# Patient Record
Sex: MALE | Race: WHITE | NOT HISPANIC OR LATINO | Employment: OTHER | URBAN - NONMETROPOLITAN AREA
[De-identification: names, ages, dates, MRNs, and addresses within clinical notes are randomized per-mention and may not be internally consistent; named-entity substitution may affect disease eponyms.]

---

## 2017-04-17 ENCOUNTER — OFFICE VISIT (OUTPATIENT)
Dept: INTERNAL MEDICINE | Facility: CLINIC | Age: 27
End: 2017-04-17

## 2017-04-17 VITALS
DIASTOLIC BLOOD PRESSURE: 78 MMHG | BODY MASS INDEX: 37.22 KG/M2 | SYSTOLIC BLOOD PRESSURE: 110 MMHG | WEIGHT: 290 LBS | HEIGHT: 74 IN | TEMPERATURE: 98.4 F | OXYGEN SATURATION: 98 % | HEART RATE: 67 BPM

## 2017-04-17 DIAGNOSIS — M25.572 LEFT LATERAL ANKLE PAIN: Primary | ICD-10-CM

## 2017-04-17 PROBLEM — F41.9 ANXIETY: Status: ACTIVE | Noted: 2017-04-17

## 2017-04-17 PROBLEM — M72.2 PLANTAR FASCIITIS: Status: ACTIVE | Noted: 2017-04-17

## 2017-04-17 PROBLEM — F32.A DEPRESSION: Status: ACTIVE | Noted: 2017-04-17

## 2017-04-17 PROBLEM — K21.9 GASTROESOPHAGEAL REFLUX DISEASE: Status: ACTIVE | Noted: 2017-04-17

## 2017-04-17 PROCEDURE — 99213 OFFICE O/P EST LOW 20 MIN: CPT | Performed by: PHYSICIAN ASSISTANT

## 2017-04-17 NOTE — PROGRESS NOTES
Dawit Schmitz is a 27 y.o. male.     Subjective   History of Present Illness   Here today with concern of about 1 year of left ankle pain and swelling intermittently. Pain radiates to the knee when worse than normal.  Swelling tends to occur posterior to the ankle.  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  Has tried wearing different shoes, inserts, weight loss and stretching but nothing has helped. Pain improves if he has a couple days off work in a row. Has been using OTC pain relievers which used to help the pain but do not much anymore. No known injury.       The following portions of the patient's history were reviewed and updated as appropriate: allergies, current medications, past family history, past medical history, past social history, past surgical history and problem list.    Review of Systems    Constitutional: Negative for appetite change, chills, fatigue, fever and unexpected weight change.   HENT: Negative for congestion, ear pain, hearing loss, nosebleeds, postnasal drip, rhinorrhea, sore throat, tinnitus and trouble swallowing.    Eyes: Negative for photophobia, discharge and visual disturbance.   Respiratory: Negative for cough, chest tightness, shortness of breath and wheezing.    Cardiovascular: Negative for chest pain, palpitations and leg swelling.   Gastrointestinal: Negative for abdominal distention, abdominal pain, blood in stool, constipation, diarrhea, nausea and vomiting.   Endocrine: Negative for cold intolerance, heat intolerance, polydipsia, polyphagia and polyuria.   Musculoskeletal: Left ankle pain and swelling.  Negative for back pain, myalgias, neck pain and neck stiffness.   Skin: Negative for color change, pallor, rash and wound.   Allergic/Immunologic: Negative for environmental allergies, food allergies and immunocompromised state.   Neurological: Negative for dizziness, tremors, seizures, weakness, numbness and headaches.  "  Hematological: Negative for adenopathy. Does not bruise/bleed easily.   Psychiatric/Behavioral: Negative for sleep disturbances, agitation, behavioral problems, confusion, hallucinations, self-injury and suicidal ideas. The patient is not nervous/anxious.      Objective    Physical Exam  Constitutional: Oriented to person, place, and time. Appears well-developed and well-nourished.   HENT:   Head: Normocephalic and atraumatic.   Eyes: EOM are normal. Pupils are equal, round, and reactive to light.   Neck: Normal range of motion. Neck supple.   Cardiovascular: Normal rate, regular rhythm and normal heart sounds.    Pulmonary/Chest: Effort normal and breath sounds normal. No respiratory distress.  Has no wheezes or rales. Exhibits no chest wall tenderness.   Abdominal: Soft. Bowel sounds are normal. Exhibits no distension and no mass. There is no tenderness.   Musculoskeletal: anterior left lateral ankle tenderness. Normal range of motion.   Neurological: Alert and oriented to person, place, and time.   Skin: Skin is warm and dry.   Psychiatric: Has a normal mood and affect. Behavior is normal. Judgment and thought content normal.       /78  Pulse 67  Temp 98.4 °F (36.9 °C)  Ht 74\" (188 cm)  Wt 290 lb (132 kg)  SpO2 98%  BMI 37.23 kg/m2    Nursing note and vitals reviewed.        Assessment/Plan   Dawit was seen today for edema.    Diagnoses and all orders for this visit:    Left lateral ankle pain  -     Ambulatory Referral to Orthopedic Surgery  -     diclofenac (VOLTAREN) 50 MG EC tablet; Take 1 tablet by mouth 3 (Three) Times a Day As Needed (for ankle pain, take with food.).             "

## 2017-05-03 DIAGNOSIS — M25.572 LEFT ANKLE PAIN, UNSPECIFIED CHRONICITY: Primary | ICD-10-CM

## 2017-05-04 ENCOUNTER — OFFICE VISIT (OUTPATIENT)
Dept: ORTHOPEDIC SURGERY | Facility: CLINIC | Age: 27
End: 2017-05-04

## 2017-05-04 VITALS — HEIGHT: 73 IN | RESPIRATION RATE: 18 BRPM | WEIGHT: 292 LBS | BODY MASS INDEX: 38.7 KG/M2

## 2017-05-04 DIAGNOSIS — M19.079 ARTHRITIS OF ANKLE JOINT: ICD-10-CM

## 2017-05-04 DIAGNOSIS — M25.572 ACUTE LEFT ANKLE PAIN: Primary | ICD-10-CM

## 2017-05-04 PROCEDURE — 99204 OFFICE O/P NEW MOD 45 MIN: CPT | Performed by: PODIATRIST

## 2017-05-17 ENCOUNTER — APPOINTMENT (OUTPATIENT)
Dept: MRI IMAGING | Facility: HOSPITAL | Age: 27
End: 2017-05-17
Attending: PODIATRIST

## 2017-12-14 ENCOUNTER — OFFICE VISIT (OUTPATIENT)
Dept: INTERNAL MEDICINE | Facility: CLINIC | Age: 27
End: 2017-12-14

## 2017-12-14 VITALS
TEMPERATURE: 98.4 F | BODY MASS INDEX: 37.64 KG/M2 | OXYGEN SATURATION: 98 % | SYSTOLIC BLOOD PRESSURE: 116 MMHG | HEART RATE: 78 BPM | WEIGHT: 284 LBS | HEIGHT: 73 IN | DIASTOLIC BLOOD PRESSURE: 80 MMHG

## 2017-12-14 DIAGNOSIS — F32.0 MILD SINGLE CURRENT EPISODE OF MAJOR DEPRESSIVE DISORDER (HCC): Primary | ICD-10-CM

## 2017-12-14 DIAGNOSIS — F41.9 ANXIETY: ICD-10-CM

## 2017-12-14 DIAGNOSIS — G89.29 CHRONIC PAIN OF LEFT ANKLE: ICD-10-CM

## 2017-12-14 DIAGNOSIS — M25.572 CHRONIC PAIN OF LEFT ANKLE: ICD-10-CM

## 2017-12-14 PROCEDURE — 99214 OFFICE O/P EST MOD 30 MIN: CPT | Performed by: PHYSICIAN ASSISTANT

## 2017-12-14 RX ORDER — FLUOXETINE HYDROCHLORIDE 20 MG/1
20 CAPSULE ORAL DAILY
Qty: 30 CAPSULE | Refills: 2 | Status: SHIPPED | OUTPATIENT
Start: 2017-12-14 | End: 2017-12-18 | Stop reason: SINTOL

## 2017-12-14 NOTE — PROGRESS NOTES
Dawit Schmitz is a 27 y.o. male.     Subjective   History of Present Illness   In the past he has tried counseling and been given ways to help with anxiety but never had any improvement in depression. Has never had medication in the past. Concerned with depression and anxiety worsening in the last year. Has had thoughts of hurting himself off and on since age 14 but worse in the last year.  He denies any attempt to hurt himself in the past and does not feel he would act on anything. Denies HI.     Was seeing Dr. Boone due to chronic left ankle pain and had an MRI ordered which was not completed due to his work schedule. He would like the MRI done now.        The following portions of the patient's history were reviewed and updated as appropriate: allergies, current medications, past family history, past medical history, past social history, past surgical history and problem list.    Review of Systems    Constitutional: Negative for appetite change, chills, fatigue, fever and unexpected weight change.   HENT: Negative for congestion, ear pain, hearing loss, nosebleeds, postnasal drip, rhinorrhea, sore throat, tinnitus and trouble swallowing.    Eyes: Negative for photophobia, discharge and visual disturbance.   Respiratory: Negative for cough, chest tightness, shortness of breath and wheezing.    Cardiovascular: Negative for chest pain, palpitations and leg swelling.   Gastrointestinal: Negative for abdominal distention, abdominal pain, blood in stool, constipation, diarrhea, nausea and vomiting.   Endocrine: Negative for cold intolerance, heat intolerance, polydipsia, polyphagia and polyuria.   Musculoskeletal: left ankle pain. Negative for back pain, joint swelling, myalgias, neck pain and neck stiffness.   Skin: Negative for color change, pallor, rash and wound.   Allergic/Immunologic: Negative for environmental allergies, food allergies and immunocompromised state.   Neurological: Negative for dizziness,  "tremors, seizures, weakness, numbness and headaches.   Hematological: Negative for adenopathy. Does not bruise/bleed easily.   Psychiatric/Behavioral: dysphoric mood, anxious, thoughts of self harm. Negative for sleep disturbances, agitation, behavioral problems, confusion, hallucinations.      Objective    Physical Exam  Constitutional: Oriented to person, place, and time. Appears well-developed and well-nourished.   HENT:   Head: Normocephalic and atraumatic.   Eyes: EOM are normal. Pupils are equal, round, and reactive to light.   Neck: Normal range of motion. Neck supple.   Cardiovascular: Normal rate, regular rhythm and normal heart sounds.    Pulmonary/Chest: Effort normal and breath sounds normal. No respiratory distress.  Has no wheezes or rales. Exhibits no chest wall tenderness.   Abdominal: Soft. Bowel sounds are normal. Exhibits no distension and no mass. There is no tenderness.   Musculoskeletal: Normal range of motion. Exhibits no tenderness.   Neurological: Alert and oriented to person, place, and time.   Skin: Skin is warm and dry.   Psychiatric: Has a normal mood and affect. Behavior is normal. Judgment and thought content normal.       /80  Pulse 78  Temp 98.4 °F (36.9 °C)  Ht 185.4 cm (72.99\")  Wt 129 kg (284 lb)  SpO2 98%  BMI 37.48 kg/m2    Nursing note and vitals reviewed.      PHQ-9 Total Score: 14      Assessment/Plan   Dawit was seen today for depression and l ankle problem.    Diagnoses and all orders for this visit:    Mild single current episode of major depressive disorder  -     FLUoxetine (PROZAC) 20 MG capsule; Take 1 capsule by mouth Daily.    Anxiety  -     FLUoxetine (PROZAC) 20 MG capsule; Take 1 capsule by mouth Daily.  Discussed risks, benefits and potential side effects of medication and he agreed to use.     Chronic pain of left ankle  F/u with Dr. Boone for MRI.         F/u in 1 month to evaluate efficacy of Prozac.            "

## 2017-12-18 ENCOUNTER — TELEPHONE (OUTPATIENT)
Dept: INTERNAL MEDICINE | Facility: CLINIC | Age: 27
End: 2017-12-18

## 2017-12-18 RX ORDER — CITALOPRAM 10 MG/1
10 TABLET ORAL DAILY
Qty: 30 TABLET | Refills: 1 | Status: SHIPPED | OUTPATIENT
Start: 2017-12-18 | End: 2018-01-18 | Stop reason: SDUPTHER

## 2017-12-18 NOTE — TELEPHONE ENCOUNTER
Patient called the office in regards to his Rx her recently was prescribed Prozac. He states that he is concerned due to him having negative side effects with the medication and he is wanting to see if he needs to be seen or come in in regards to changing this medication.

## 2017-12-18 NOTE — TELEPHONE ENCOUNTER
Anxiety is worse, headaches, forgetful, concentration, elevated pulse, agitated, exhaustion, diarrhea, please call to discuss

## 2018-01-18 ENCOUNTER — OFFICE VISIT (OUTPATIENT)
Dept: INTERNAL MEDICINE | Facility: CLINIC | Age: 28
End: 2018-01-18

## 2018-01-18 VITALS
TEMPERATURE: 98.5 F | DIASTOLIC BLOOD PRESSURE: 76 MMHG | HEIGHT: 73 IN | WEIGHT: 283 LBS | OXYGEN SATURATION: 99 % | BODY MASS INDEX: 37.51 KG/M2 | HEART RATE: 84 BPM | SYSTOLIC BLOOD PRESSURE: 120 MMHG

## 2018-01-18 DIAGNOSIS — F41.9 ANXIETY: ICD-10-CM

## 2018-01-18 DIAGNOSIS — H91.92 HEARING LOSS OF LEFT EAR, UNSPECIFIED HEARING LOSS TYPE: ICD-10-CM

## 2018-01-18 DIAGNOSIS — F32.0 MILD SINGLE CURRENT EPISODE OF MAJOR DEPRESSIVE DISORDER (HCC): Primary | ICD-10-CM

## 2018-01-18 PROCEDURE — 99214 OFFICE O/P EST MOD 30 MIN: CPT | Performed by: PHYSICIAN ASSISTANT

## 2018-01-18 RX ORDER — CITALOPRAM 20 MG/1
20 TABLET ORAL DAILY
Qty: 30 TABLET | Refills: 5 | Status: SHIPPED | OUTPATIENT
Start: 2018-01-18 | End: 2018-04-19 | Stop reason: SDUPTHER

## 2018-01-18 NOTE — PROGRESS NOTES
Dawit Schmitz is a 27 y.o. male.     Subjective   History of Present Illness   Here today for follow up of depression and anxiety. 1 month ago was started on Prozac which made him more anxious so he was changed to Celexa which he feels has been very effective, improving symptoms about 65%. Still has occasional days of feeling anxious but they are much more rare. Has not vomited due to anxiety in about 3 weeks.  Suicidal thoughts have drastically improved and are nearly non-existent in the last 2 weeks which he feels is due to his depression being so well controlled. Sleeping well. Denies any side effects of the medication.     Concerned with hearing loss in the left ear for the last 3 months. Denies ear pain, ringing in ears, feeling of fullness or discharge.       The following portions of the patient's history were reviewed and updated as appropriate: allergies, current medications, past family history, past medical history, past social history, past surgical history and problem list.    Review of Systems    Constitutional: Negative for appetite change, chills, fatigue, fever and unexpected weight change.   HENT: hearing loss left ear. Negative for congestion, ear pain, nosebleeds, postnasal drip, rhinorrhea, sore throat, tinnitus and trouble swallowing.    Eyes: Negative for photophobia, discharge and visual disturbance.   Respiratory: Negative for cough, chest tightness, shortness of breath and wheezing.    Cardiovascular: Negative for chest pain, palpitations and leg swelling.   Gastrointestinal: Negative for abdominal distention, abdominal pain, blood in stool, constipation, diarrhea, nausea and vomiting.   Endocrine: Negative for cold intolerance, heat intolerance, polydipsia, polyphagia and polyuria.   Musculoskeletal: Negative for arthralgias, back pain, joint swelling, myalgias, neck pain and neck stiffness.   Skin: Negative for color change, pallor, rash and wound.   Allergic/Immunologic: Negative for  "environmental allergies, food allergies and immunocompromised state.   Neurological: Negative for dizziness, tremors, seizures, weakness, numbness and headaches.   Hematological: Negative for adenopathy. Does not bruise/bleed easily.   Psychiatric/Behavioral: dysphoric mood- improved. Anxiety- improved some. Suicidal ideation- nearly resolved.  Negative for sleep disturbances, agitation, behavioral problems, confusion, hallucinations, self-injury.    Objective    Physical Exam  Constitutional: Oriented to person, place, and time. Appears well-developed and well-nourished.   HENT: bilateral TMs normal. OP normal.   Head: no sinus tenderness. Normocephalic and atraumatic.   Eyes: EOM are normal. Pupils are equal, round, and reactive to light.   Neck: Normal range of motion. Neck supple.   Cardiovascular: Normal rate, regular rhythm and normal heart sounds.    Pulmonary/Chest: Effort normal and breath sounds normal. No respiratory distress.  Has no wheezes or rales. Exhibits no chest wall tenderness.   Abdominal: Soft. Bowel sounds are normal. Exhibits no distension and no mass. There is no tenderness.   Musculoskeletal: Normal range of motion. Exhibits no tenderness.   Neurological: Alert and oriented to person, place, and time.   Skin: Skin is warm and dry.   Psychiatric: Has a normal mood and affect. Behavior is normal. Judgment and thought content normal.       /76  Pulse 84  Temp 98.5 °F (36.9 °C)  Ht 185.4 cm (72.99\")  Wt 128 kg (283 lb)  SpO2 99%  BMI 37.35 kg/m2    Nursing note and vitals reviewed.        Assessment/Plan   Dawit was seen today for follow-up, depression and ear problem.    Diagnoses and all orders for this visit:    Mild single current episode of major depressive disorder    Anxiety    Hearing loss of left ear, unspecified hearing loss type  -     Ambulatory Referral to ENT (Otolaryngology)    Other orders  -     citalopram (CeleXA) 20 MG tablet; Take 1 tablet by mouth " Daily.    Increase Celexa dose. If anxiety does not become better controlled with dose increase he is open to considering addition of Buspar prn.     F/u in 3 months or sooner if needed.

## 2018-01-29 ENCOUNTER — TELEPHONE (OUTPATIENT)
Dept: INTERNAL MEDICINE | Facility: CLINIC | Age: 28
End: 2018-01-29

## 2018-01-29 NOTE — TELEPHONE ENCOUNTER
Patient called and states at his last appointment he discussed buspar, he states he was told to call back if he wanted to start on this medication and he does. He uses [facility] pharmacy.

## 2018-01-30 RX ORDER — BUSPIRONE HYDROCHLORIDE 10 MG/1
TABLET ORAL
Qty: 90 TABLET | Refills: 0 | Status: SHIPPED | OUTPATIENT
Start: 2018-01-30 | End: 2018-03-19

## 2018-03-19 ENCOUNTER — OFFICE VISIT (OUTPATIENT)
Dept: INTERNAL MEDICINE | Facility: CLINIC | Age: 28
End: 2018-03-19

## 2018-03-19 VITALS
HEART RATE: 85 BPM | DIASTOLIC BLOOD PRESSURE: 72 MMHG | SYSTOLIC BLOOD PRESSURE: 119 MMHG | WEIGHT: 271 LBS | OXYGEN SATURATION: 96 % | TEMPERATURE: 98.3 F | BODY MASS INDEX: 35.92 KG/M2 | HEIGHT: 73 IN

## 2018-03-19 DIAGNOSIS — Z00.00 ANNUAL PHYSICAL EXAM: Primary | ICD-10-CM

## 2018-03-19 PROCEDURE — 99395 PREV VISIT EST AGE 18-39: CPT | Performed by: PHYSICIAN ASSISTANT

## 2018-03-19 NOTE — PROGRESS NOTES
Subjective:    Chief Complaint: Physical Exam     History of Present Illness   Dawit Schmitz is a 27 y.o. male here for his yearly physical exam. 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 No new complaints today. Depression/ anxiety stable on Celexa. Occasionally takes diclofenac for ankle pain.     History:  Past Medical History:   Diagnosis Date   • Ankle sprain     left   • Anxiety    • Depression    • Fracture, foot     growth plate in right heel   • Rotator cuff syndrome     right   • Tennis elbow      Do you take any herbs or supplements that were not prescribed by a doctor? no  Are you taking calcium supplements? No    Allergies   Allergen Reactions   • Prozac [Fluoxetine Hcl] Anxiety     Worsened anxiety.        History reviewed. No pertinent surgical history.    Social History   Substance Use Topics   • Smoking status: Never Smoker   • Smokeless tobacco: Not on file   • Alcohol use No       Family History   Problem Relation Age of Onset   • Family history unknown: Yes       Review of Systems   Constitutional: Negative for appetite change, chills, fatigue, fever and unexpected weight change.   HENT: Negative for congestion, ear pain, hearing loss, nosebleeds, sinus pressure, sore throat, tinnitus and trouble swallowing.    Eyes: Negative for photophobia, discharge and visual disturbance.   Respiratory: Negative for cough, chest tightness, shortness of breath and wheezing.    Cardiovascular: Negative for chest pain, palpitations and leg swelling.   Gastrointestinal: Negative for abdominal distention, abdominal pain, blood in stool, constipation, diarrhea, nausea and vomiting.   Endocrine: Negative for cold intolerance, heat intolerance, polydipsia, polyphagia and polyuria.   Genitourinary: Negative for difficulty urinating, dysuria, flank pain, frequency, hematuria and urgency.   Musculoskeletal: Negative.  Negative for arthralgias, back pain, joint swelling, myalgias, neck pain and neck  "stiffness.   Skin: Negative for color change, pallor, rash and wound.   Allergic/Immunologic: Negative for environmental allergies, food allergies and immunocompromised state.   Neurological: Negative for dizziness, weakness, numbness and headaches.   Hematological: Negative for adenopathy. Does not bruise/bleed easily.   Psychiatric/Behavioral: Negative for dysphoric mood, hallucinations, sleep disturbance and suicidal ideas. The patient is not nervous/anxious.       Objective:    Vitals:    03/19/18 1643   BP: 119/72   Pulse: 85   Temp: 98.3 °F (36.8 °C)   SpO2: 96%   Weight: 123 kg (271 lb)   Height: 185.4 cm (72.99\")     Physical Exam   Constitutional: Appears well-developed and well-nourished.   HENT:   Right Ear: Tympanic membrane and external ear normal.   Left Ear: Tympanic membrane and external ear normal.   Nose: Nose normal.   Mouth/Throat: Oropharynx is clear and moist.   Eyes: EOM are normal. Pupils are equal, round, and reactive to light.   Neck: Normal range of motion. Neck supple.   Cardiovascular: Normal rate, regular rhythm and normal heart sounds.    Pulmonary/Chest: Effort normal and breath sounds normal.   Abdominal: Soft. Bowel sounds are normal  Musculoskeletal: Normal range of motion.   Lymphadenopathy: No cervical adenopathy noted.   Neurological: Alert and oriented to person, place, and time.   Skin: Skin is warm and dry.   Psychiatric: Exhibits a normal mood and affect.     Assessment and Plan:     Dawit was seen today for annual exam.    Diagnoses and all orders for this visit:    Annual physical exam    Patient Counseling:  -Nutrition: Stressed importance of moderation in sodium/caffeine intake, saturated fat and cholesterol, caloric balance, sufficient intake of fresh fruits, vegetables, fiber, calcium, iron, and 1 g folate supplementation if of childbearing age.   -Exercise: Stressed the importance of regular exercise.  -Substance Abuse: Discussed cessation/primary prevention of " tobacco (if applicable), alcohol, or other drug use (if applicable); driving or other dangerous activities under the influence; availability of treatment for abuse.   -Injury prevention: Discussed safety belts, safety helmets, smoke detector, smoking near bedding or upholstery.   -Dental health: Discussed importance of regular tooth brushing, flossing, and dental visits.  -Immunizations reviewed.  -After hours service discussed with patient.    Discussed the patient's BMI with him. The BMI is above average; BMI management plan is completed    [facility] [employer]    Return in about 1 year (around 3/19/2019).    Ashleigh Loja PA-C  03/19/2018    Please note that portions of this note were completed with a voice recognition program. Efforts were made to edit dictation, but occasionally words are mistranscribed.

## 2018-04-19 ENCOUNTER — OFFICE VISIT (OUTPATIENT)
Dept: INTERNAL MEDICINE | Facility: CLINIC | Age: 28
End: 2018-04-19

## 2018-04-19 VITALS
SYSTOLIC BLOOD PRESSURE: 120 MMHG | TEMPERATURE: 98.7 F | WEIGHT: 275 LBS | HEART RATE: 88 BPM | BODY MASS INDEX: 36.45 KG/M2 | OXYGEN SATURATION: 98 % | DIASTOLIC BLOOD PRESSURE: 80 MMHG | HEIGHT: 73 IN

## 2018-04-19 DIAGNOSIS — F32.4 MAJOR DEPRESSIVE DISORDER WITH SINGLE EPISODE, IN PARTIAL REMISSION (HCC): Primary | ICD-10-CM

## 2018-04-19 DIAGNOSIS — F41.9 ANXIETY: ICD-10-CM

## 2018-04-19 PROCEDURE — 99213 OFFICE O/P EST LOW 20 MIN: CPT | Performed by: PHYSICIAN ASSISTANT

## 2018-04-19 RX ORDER — CITALOPRAM 10 MG/1
10 TABLET ORAL DAILY
Qty: 30 TABLET | Refills: 5 | Status: SHIPPED | OUTPATIENT
Start: 2018-04-19 | End: 2018-11-20

## 2018-04-19 RX ORDER — CITALOPRAM 20 MG/1
20 TABLET ORAL DAILY
Qty: 30 TABLET | Refills: 5 | Status: SHIPPED | OUTPATIENT
Start: 2018-04-19 | End: 2018-11-20

## 2018-04-19 NOTE — PROGRESS NOTES
Dawit Schmitz is a 28 y.o. male.     Subjective   History of Present Illness   Here today for follow up of depression and anxiety. Dose increase of Celexa 3 months ago has improved his depression significantly and he feels it is about 95% resolved. Anxiety has also improved but not as significantly as depression, approximately a 60% improvement.  He tried Buspar prn which made his anxiety worse. He denies sleep disturbances, SI or HI.            The following portions of the patient's history were reviewed and updated as appropriate: allergies, current medications, past family history, past medical history, past social history, past surgical history and problem list.    Review of Systems    Constitutional: Negative for appetite change, chills, fatigue, fever and unexpected weight change.   HENT: Negative for congestion, ear pain, hearing loss, nosebleeds, postnasal drip, rhinorrhea, sore throat, tinnitus and trouble swallowing.    Eyes: Negative for photophobia, discharge and visual disturbance.   Respiratory: Negative for cough, chest tightness, shortness of breath and wheezing.    Cardiovascular: Negative for chest pain, palpitations and leg swelling.   Gastrointestinal: Negative for abdominal distention, abdominal pain, blood in stool, constipation, diarrhea, nausea and vomiting.   Endocrine: Negative for cold intolerance, heat intolerance, polydipsia, polyphagia and polyuria.   Musculoskeletal: Negative for arthralgias, back pain, joint swelling, myalgias, neck pain and neck stiffness.   Skin: Negative for color change, pallor, rash and wound.   Allergic/Immunologic: Negative for environmental allergies, food allergies and immunocompromised state.   Neurological: Negative for dizziness, tremors, seizures, weakness, numbness and headaches.   Hematological: Negative for adenopathy. Does not bruise/bleed easily.   Psychiatric/Behavioral: dysphoric mood, anxious. Negative for sleep disturbances, agitation,  "behavioral problems, confusion, hallucinations, self-injury and suicidal ideas.     Objective    Physical Exam  Constitutional: Oriented to person, place, and time. Appears well-developed and well-nourished.   Head: Normocephalic and atraumatic.   Eyes: EOM are normal. Pupils are equal, round, and reactive to light.   Neck: Normal range of motion. Neck supple.   Cardiovascular: Normal rate, regular rhythm and normal heart sounds.    Pulmonary/Chest: Effort normal and breath sounds normal. No respiratory distress.  Has no wheezes or rales. Exhibits no chest wall tenderness.   Abdominal: Soft. Bowel sounds are normal. Exhibits no distension and no mass. There is no tenderness.   Musculoskeletal: Normal range of motion. Exhibits no tenderness.   Neurological: Alert and oriented to person, place, and time.   Skin: Skin is warm and dry.   Psychiatric: Has a normal mood and affect. Behavior is normal. Judgment and thought content normal.       /80   Pulse 88   Temp 98.7 °F (37.1 °C)   Ht 185.4 cm (72.99\")   Wt 125 kg (275 lb)   SpO2 98%   BMI 36.29 kg/m²     Nursing note and vitals reviewed.        Assessment/Plan   Dawit was seen today for depression and follow-up.    Diagnoses and all orders for this visit:    Major depressive disorder with single episode, in partial remission  Anxiety  -     citalopram (CeleXA) 20 MG tablet; Take 1 tablet by mouth Daily. Take along with 10 mg dose.  -     citalopram (CELEXA) 10 MG tablet; Take 1 tablet by mouth Daily. Take along with 20 mg dose.  Dose increase to 30 mg daily.       F/u 6 months or sooner if needed.            "

## 2018-05-02 ENCOUNTER — OFFICE VISIT (OUTPATIENT)
Dept: INTERNAL MEDICINE | Facility: CLINIC | Age: 28
End: 2018-05-02

## 2018-05-02 VITALS
WEIGHT: 274 LBS | SYSTOLIC BLOOD PRESSURE: 132 MMHG | HEART RATE: 89 BPM | BODY MASS INDEX: 36.31 KG/M2 | DIASTOLIC BLOOD PRESSURE: 70 MMHG | TEMPERATURE: 98.3 F | HEIGHT: 73 IN | OXYGEN SATURATION: 98 %

## 2018-05-02 DIAGNOSIS — S39.012A STRAIN OF LUMBAR REGION, INITIAL ENCOUNTER: Primary | ICD-10-CM

## 2018-05-02 PROCEDURE — 99213 OFFICE O/P EST LOW 20 MIN: CPT | Performed by: PHYSICIAN ASSISTANT

## 2018-05-02 RX ORDER — CYCLOBENZAPRINE HCL 5 MG
5 TABLET ORAL 3 TIMES DAILY PRN
Qty: 30 TABLET | Refills: 0 | OUTPATIENT
Start: 2018-05-02 | End: 2018-06-19

## 2018-05-02 NOTE — PROGRESS NOTES
Subjective     Chief Complaint: lower back pain    History of Present Illness     Dawit Schmitz is a 28 y.o. male presenting with complaints of lower back pain due to wrestling injury occurring 3 days ago. Fell onto his left side. He has used ice and ibuprofen intermittently, brought some relief. Denies loss in bowel or bladder function, denies blood in urine, denies CP, SOA. Mild discomfort with deep breathing. Feels tight.    The following portions of the patient's history were reviewed and updated as appropriate: current medications, allergies, PMH.    Review of Systems   Constitutional: Negative for appetite change, chills, fatigue, fever and unexpected weight change.   HENT: Negative for congestion, ear pain, hearing loss, nosebleeds, sinus pressure, sore throat, tinnitus and trouble swallowing.    Eyes: Negative for photophobia, discharge and visual disturbance.   Respiratory: Negative for cough, chest tightness, shortness of breath and wheezing.    Cardiovascular: Negative for chest pain, palpitations and leg swelling.   Gastrointestinal: Negative for abdominal distention, abdominal pain, blood in stool, constipation, diarrhea, nausea and vomiting.   Endocrine: Negative for cold intolerance, heat intolerance, polydipsia, polyphagia and polyuria.   Genitourinary: Negative for difficulty urinating, dysuria, flank pain, frequency, hematuria and urgency.   Musculoskeletal: Positive for back pain and myalgias. Negative for arthralgias, joint swelling, neck pain and neck stiffness.   Skin: Negative for color change, pallor, rash and wound.   Allergic/Immunologic: Negative for environmental allergies, food allergies and immunocompromised state.   Neurological: Negative for dizziness, weakness, numbness and headaches.   Hematological: Negative for adenopathy. Does not bruise/bleed easily.   Psychiatric/Behavioral: Negative for dysphoric mood, hallucinations, sleep disturbance and suicidal ideas. The patient is not  "nervous/anxious.      Objective     Vitals:    05/02/18 1712   BP: 132/70   Pulse: 89   Temp: 98.3 °F (36.8 °C)   SpO2: 98%   Weight: 124 kg (274 lb)   Height: 185.4 cm (72.99\")     Physical Exam   Constitutional: He appears well-developed and well-nourished.   Cardiovascular: Normal rate and regular rhythm.    Pulmonary/Chest: Effort normal and breath sounds normal.   Abdominal: Soft. Bowel sounds are normal. There is no tenderness.   Musculoskeletal:        Thoracic back: He exhibits tenderness and spasm. He exhibits normal range of motion.        Lumbar back: He exhibits tenderness and spasm. He exhibits normal range of motion.   TTP left lower lumbar area.     Assessment/Plan     Diagnoses and all orders for this visit:    Strain of lumbar region, initial encounter  -     cyclobenzaprine (FLEXERIL) 5 MG tablet; Take 1 tablet by mouth 3 (Three) Times a Day As Needed for Muscle Spasms.    Pt has diclofenac at home for prn use, may try taking this with food. May also try flexeril qhs.  Rest, take a few days off of wrestling.    Ashleigh Loja PA-C  05/02/2018         Please note that portions of this note were completed with a voice recognition program. Efforts were made to edit dictation, but occasionally words are mistranscribed.    "

## 2018-06-05 PROCEDURE — 99282 EMERGENCY DEPT VISIT SF MDM: CPT

## 2018-06-06 ENCOUNTER — APPOINTMENT (OUTPATIENT)
Dept: GENERAL RADIOLOGY | Facility: HOSPITAL | Age: 28
End: 2018-06-06

## 2018-06-06 ENCOUNTER — HOSPITAL ENCOUNTER (EMERGENCY)
Facility: HOSPITAL | Age: 28
Discharge: HOME | End: 2018-06-06
Attending: STUDENT IN AN ORGANIZED HEALTH CARE EDUCATION/TRAINING PROGRAM | Admitting: STUDENT IN AN ORGANIZED HEALTH CARE EDUCATION/TRAINING PROGRAM

## 2018-06-06 VITALS
OXYGEN SATURATION: 97 % | HEART RATE: 88 BPM | SYSTOLIC BLOOD PRESSURE: 137 MMHG | TEMPERATURE: 98.3 F | HEIGHT: 74 IN | WEIGHT: 267 LBS | RESPIRATION RATE: 20 BRPM | BODY MASS INDEX: 34.27 KG/M2 | DIASTOLIC BLOOD PRESSURE: 90 MMHG

## 2018-06-06 DIAGNOSIS — S22.31XA CLOSED FRACTURE OF ONE RIB OF RIGHT SIDE, INITIAL ENCOUNTER: Primary | ICD-10-CM

## 2018-06-06 PROCEDURE — 71046 X-RAY EXAM CHEST 2 VIEWS: CPT

## 2018-06-06 RX ORDER — HYDROCODONE BITARTRATE AND ACETAMINOPHEN 5; 325 MG/1; MG/1
1 TABLET ORAL EVERY 6 HOURS PRN
Qty: 10 TABLET | Refills: 0 | Status: SHIPPED | OUTPATIENT
Start: 2018-06-06 | End: 2018-11-20

## 2018-06-06 NOTE — ED PROVIDER NOTES
"Subjective   Patient is a 28-year-old male who presents with complaints of right back pain.  He is worried that he cracked a rib.  The patient is currently in training to be a  and states that he hurt his back doing his \"standard workout \".  Worse with taking deep breath or twists.            Review of Systems   All other systems reviewed and are negative.      Past Medical History:   Diagnosis Date   • Ankle sprain     left   • Anxiety    • Depression    • Fracture, foot     growth plate in right heel   • Rotator cuff syndrome     right   • Tennis elbow        Allergies   Allergen Reactions   • Prozac [Fluoxetine Hcl] Anxiety     Worsened anxiety.        History reviewed. No pertinent surgical history.    Family History   Problem Relation Age of Onset   • Family history unknown: Yes       Social History     Social History   • Marital status: Single     Occupational History   •       lockbox     Social History Main Topics   • Smoking status: Never Smoker   • Alcohol use No   • Drug use: No   • Sexual activity: Defer     Other Topics Concern   • Not on file           Objective   Physical Exam   Nursing note and vitals reviewed.    GEN: No acute distress  Head: Normocephalic, atraumatic  Eyes: Pupils equal round reactive to light  ENT: Posterior pharynx normal in appearance, oral mucosa is moist  Chest: Her to palpation right mid thoracic back.  He has pain in this location as well with compression of the ring structure of the chest  Cardiovascular: Regular rate  Lungs: Clear to auscultation bilaterally  Abdomen: Soft, nontender, nondistended, no peritoneal signs  Extremities: No edema, normal appearance  Neuro: GCS 15  Psych: Mood and affect are appropriate    Procedures           ED Course                  MDM  Number of Diagnoses or Management Options  Closed fracture of one rib of right side, initial encounter:   Diagnosis management comments: Right posterior eighth rib fracture    Norco " 5/325 every 6 hours when necessary pain #10       Amount and/or Complexity of Data Reviewed  Tests in the radiology section of CPT®: reviewed  Independent visualization of images, tracings, or specimens: yes          Final diagnoses:   Closed fracture of one rib of right side, initial encounter            Valentin Padilla MD  06/06/18 0248

## 2018-06-11 ENCOUNTER — OFFICE VISIT (OUTPATIENT)
Dept: INTERNAL MEDICINE | Facility: CLINIC | Age: 28
End: 2018-06-11

## 2018-06-11 VITALS
WEIGHT: 272 LBS | HEIGHT: 74 IN | HEART RATE: 75 BPM | OXYGEN SATURATION: 98 % | SYSTOLIC BLOOD PRESSURE: 128 MMHG | TEMPERATURE: 97.7 F | BODY MASS INDEX: 34.91 KG/M2 | DIASTOLIC BLOOD PRESSURE: 80 MMHG

## 2018-06-11 DIAGNOSIS — S22.31XA CLOSED FRACTURE OF ONE RIB OF RIGHT SIDE, INITIAL ENCOUNTER: Primary | ICD-10-CM

## 2018-06-11 DIAGNOSIS — S29.8XXA BLUNT TRAUMA TO CHEST, INITIAL ENCOUNTER: ICD-10-CM

## 2018-06-11 PROCEDURE — 99213 OFFICE O/P EST LOW 20 MIN: CPT | Performed by: PHYSICIAN ASSISTANT

## 2018-06-12 NOTE — PROGRESS NOTES
Subjective     Chief Complaint: rib pain    History of Present Illness     Dawit Schmitz is a 28 y.o. male presenting for follow up regarding possible rib fracture. 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 He states he was going his routine workouts when he noticed a pain in the right side of his back, worse with deep breathing. Pain did not go away that night so he went to the ED and was diagnosed with a posterior rib fracture. Given short course of norco.    Pain has somewhat improved, but he is concerned about being off of work. 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 Does a lot of lifting, bending, twisting, etc.     The following portions of the patient's history were reviewed and updated as appropriate: current medications, allergies, PMH.    Review of Systems   Constitutional: Negative for appetite change, chills, fatigue, fever and unexpected weight change.   HENT: Negative for congestion, ear pain, hearing loss, nosebleeds, sinus pressure, sore throat, tinnitus and trouble swallowing.    Eyes: Negative for photophobia, discharge and visual disturbance.   Respiratory: Negative for cough, chest tightness, shortness of breath and wheezing.    Cardiovascular: Negative for chest pain, palpitations and leg swelling.   Gastrointestinal: Negative for abdominal distention, abdominal pain, blood in stool, constipation, diarrhea, nausea and vomiting.   Endocrine: Negative for cold intolerance, heat intolerance, polydipsia, polyphagia and polyuria.   Genitourinary: Negative for difficulty urinating, dysuria, flank pain, frequency, hematuria and urgency.   Musculoskeletal: Negative for arthralgias, back pain, joint swelling, myalgias, neck pain and neck stiffness.        Rib pain.   Skin: Negative for color change, pallor, rash and wound.   Allergic/Immunologic: Negative for environmental allergies,  "food allergies and immunocompromised state.   Neurological: Negative for dizziness, weakness, numbness and headaches.   Hematological: Negative for adenopathy. Does not bruise/bleed easily.   Psychiatric/Behavioral: Negative for dysphoric mood, hallucinations, sleep disturbance and suicidal ideas. The patient is not nervous/anxious.      Objective     Vitals:    06/11/18 1737   BP: 128/80   Pulse: 75   Temp: 97.7 °F (36.5 °C)   SpO2: 98%   Weight: 123 kg (272 lb)   Height: 188 cm (74.02\")     Physical Exam   Constitutional: He is oriented to person, place, and time. He appears well-developed and well-nourished.   HENT:   Right Ear: Tympanic membrane and external ear normal.   Left Ear: Tympanic membrane and external ear normal.   Nose: Nose normal.   Mouth/Throat: Oropharynx is clear and moist.   Eyes: EOM are normal. Pupils are equal, round, and reactive to light.   Neck: Normal range of motion. Neck supple.   Cardiovascular: Normal rate, regular rhythm and normal heart sounds.    Pulmonary/Chest: Breath sounds normal. He has no wheezes. He has no rales.         He exhibits tenderness. He exhibits no deformity and no retraction.   Area ttp.   Abdominal: Soft. Bowel sounds are normal. There is no CVA tenderness.   Musculoskeletal: Normal range of motion.   Lymphadenopathy:     He has no cervical adenopathy.   Neurological: He is alert and oriented to person, place, and time.   Skin: Skin is warm and dry.   Psychiatric: He has a normal mood and affect.       Assessment/Plan     Diagnoses and all orders for this visit:    Closed fracture of one rib of right side, initial encounter  -     CT Chest Without Contrast    CXR read as no acute bony abnormality, but told right posterior 8th rib fracture in the ED. Will CT to confirm as I do not feel comfortable making recommendations without knowing full extent of injury.    Ashleigh Loja PA-C  06/11/2018         Please note that portions of this note were completed with a " voice recognition program. Efforts were made to edit dictation, but occasionally words are mistranscribed.

## 2018-06-13 ENCOUNTER — TELEPHONE (OUTPATIENT)
Dept: INTERNAL MEDICINE | Facility: CLINIC | Age: 28
End: 2018-06-13

## 2018-06-13 NOTE — TELEPHONE ENCOUNTER
Please let him know I've tried to contact radiology several times and haven't been able to get through. I'd prefer to treat it as a rib fracture until we know otherwise. They also should be contacting him to set up CT scan- that can help us confirm.

## 2018-06-13 NOTE — TELEPHONE ENCOUNTER
DEANA  PT CALLED WANTING TO KNOW IF YOU HAD FOUND OUT ANY INFORMATION THAT YOU WERE LOOKING FOR????

## 2018-06-15 ENCOUNTER — TELEPHONE (OUTPATIENT)
Dept: INTERNAL MEDICINE | Facility: CLINIC | Age: 28
End: 2018-06-15

## 2018-06-15 NOTE — TELEPHONE ENCOUNTER
Please tell him I just checked on this, they actually just scheduled him for 4 PM on Monday (insurance had to approve it).  We are treating it as a fracture until I know otherwise, I would like for him to take it easy until we know more. [phone] is central scheduling, he can call them.

## 2018-06-15 NOTE — TELEPHONE ENCOUNTER
PT CALLED STATING THE HE WAS NEEDING AN UPDATE AS TO WHAT TO DO, HAS NOT BEEN CALLED FOR CT SCAN YET

## 2018-06-18 ENCOUNTER — HOSPITAL ENCOUNTER (OUTPATIENT)
Dept: CT IMAGING | Facility: HOSPITAL | Age: 28
Discharge: HOME | End: 2018-06-18
Admitting: PHYSICIAN ASSISTANT

## 2018-06-18 PROCEDURE — 71250 CT THORAX DX C-: CPT

## 2018-06-19 NOTE — PROGRESS NOTES
If he is still in pain I would not return to wrestling or do any type of activity that could cause trauma/ injury to the area. 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

## 2018-08-22 DIAGNOSIS — S39.012A STRAIN OF LUMBAR REGION, INITIAL ENCOUNTER: ICD-10-CM

## 2018-08-24 RX ORDER — CYCLOBENZAPRINE HCL 5 MG
TABLET ORAL
Qty: 30 TABLET | Refills: 0 | Status: SHIPPED | OUTPATIENT
Start: 2018-08-24 | End: 2018-11-20

## 2018-09-07 ENCOUNTER — TELEPHONE (OUTPATIENT)
Dept: INTERNAL MEDICINE | Facility: CLINIC | Age: 28
End: 2018-09-07

## 2018-09-07 NOTE — TELEPHONE ENCOUNTER
Dawit left a voice mail stating the cyclobenzaprine is not helping him. He is requesting something a little stronger.    Please advise - he last seen Ashleigh

## 2018-09-07 NOTE — TELEPHONE ENCOUNTER
Discussed with patient. Continue with NSAIDs for now. We will need to come in for evaluation. Last seen in June

## 2018-11-02 ENCOUNTER — TELEPHONE (OUTPATIENT)
Dept: INTERNAL MEDICINE | Facility: CLINIC | Age: 28
End: 2018-11-02

## 2018-11-20 ENCOUNTER — OFFICE VISIT (OUTPATIENT)
Dept: INTERNAL MEDICINE | Facility: CLINIC | Age: 28
End: 2018-11-20

## 2018-11-20 ENCOUNTER — RESULTS ENCOUNTER (OUTPATIENT)
Dept: INTERNAL MEDICINE | Facility: CLINIC | Age: 28
End: 2018-11-20

## 2018-11-20 VITALS
SYSTOLIC BLOOD PRESSURE: 122 MMHG | TEMPERATURE: 97.5 F | BODY MASS INDEX: 36.57 KG/M2 | HEIGHT: 74 IN | OXYGEN SATURATION: 97 % | WEIGHT: 285 LBS | HEART RATE: 76 BPM | DIASTOLIC BLOOD PRESSURE: 77 MMHG

## 2018-11-20 DIAGNOSIS — F41.8 ANXIETY WITH DEPRESSION: Primary | ICD-10-CM

## 2018-11-20 DIAGNOSIS — Z00.00 HEALTHCARE MAINTENANCE: ICD-10-CM

## 2018-11-20 DIAGNOSIS — F41.8 ANXIETY WITH DEPRESSION: ICD-10-CM

## 2018-11-20 LAB
25(OH)D3+25(OH)D2 SERPL-MCNC: 18.5 NG/ML
ALBUMIN SERPL-MCNC: 4 G/DL (ref 3.5–5)
ALBUMIN/GLOB SERPL: 1.7 G/DL (ref 1–2)
ALP SERPL-CCNC: 71 U/L (ref 38–126)
ALT SERPL-CCNC: 29 U/L (ref 13–69)
AST SERPL-CCNC: 19 U/L (ref 15–46)
BASOPHILS # BLD AUTO: 0.04 10*3/MM3 (ref 0–0.2)
BASOPHILS NFR BLD AUTO: 0.5 % (ref 0–2.5)
BILIRUB SERPL-MCNC: 0.3 MG/DL (ref 0.2–1.3)
BUN SERPL-MCNC: 17 MG/DL (ref 7–20)
BUN/CREAT SERPL: 17 (ref 6.3–21.9)
CALCIUM SERPL-MCNC: 9.4 MG/DL (ref 8.4–10.2)
CHLORIDE SERPL-SCNC: 107 MMOL/L (ref 98–107)
CHOLEST SERPL-MCNC: 114 MG/DL (ref 0–199)
CO2 SERPL-SCNC: 28 MMOL/L (ref 26–30)
CREAT SERPL-MCNC: 1 MG/DL (ref 0.6–1.3)
EOSINOPHIL # BLD AUTO: 0.19 10*3/MM3 (ref 0–0.7)
EOSINOPHIL NFR BLD AUTO: 2.6 % (ref 0–7)
ERYTHROCYTE [DISTWIDTH] IN BLOOD BY AUTOMATED COUNT: 13 % (ref 11.5–14.5)
GLOBULIN SER CALC-MCNC: 2.4 GM/DL
GLUCOSE SERPL-MCNC: 103 MG/DL (ref 74–98)
HCT VFR BLD AUTO: 42.1 % (ref 42–52)
HDLC SERPL-MCNC: 49 MG/DL (ref 40–60)
HGB BLD-MCNC: 13.4 G/DL (ref 14–18)
IMM GRANULOCYTES # BLD: 0.02 10*3/MM3 (ref 0–0.06)
IMM GRANULOCYTES NFR BLD: 0.3 % (ref 0–0.6)
LDLC SERPL CALC-MCNC: 59 MG/DL (ref 0–99)
LYMPHOCYTES # BLD AUTO: 1.36 10*3/MM3 (ref 0.6–3.4)
LYMPHOCYTES NFR BLD AUTO: 18.4 % (ref 10–50)
MCH RBC QN AUTO: 30.8 PG (ref 27–31)
MCHC RBC AUTO-ENTMCNC: 31.8 G/DL (ref 30–37)
MCV RBC AUTO: 96.8 FL (ref 80–94)
MONOCYTES # BLD AUTO: 0.66 10*3/MM3 (ref 0–0.9)
MONOCYTES NFR BLD AUTO: 8.9 % (ref 0–12)
NEUTROPHILS # BLD AUTO: 5.13 10*3/MM3 (ref 2–6.9)
NEUTROPHILS NFR BLD AUTO: 69.3 % (ref 37–80)
NRBC BLD AUTO-RTO: 0 /100 WBC (ref 0–0)
PLATELET # BLD AUTO: 222 10*3/MM3 (ref 130–400)
POTASSIUM SERPL-SCNC: 4.7 MMOL/L (ref 3.5–5.1)
PROT SERPL-MCNC: 6.4 G/DL (ref 6.3–8.2)
RBC # BLD AUTO: 4.35 10*6/MM3 (ref 4.7–6.1)
SODIUM SERPL-SCNC: 140 MMOL/L (ref 137–145)
TRIGL SERPL-MCNC: 30 MG/DL
TSH SERPL DL<=0.005 MIU/L-ACNC: 3.11 MIU/ML (ref 0.47–4.68)
VIT B12 SERPL-MCNC: 348 PG/ML (ref 239–931)
VLDLC SERPL CALC-MCNC: 6 MG/DL
WBC # BLD AUTO: 7.4 10*3/MM3 (ref 4.8–10.8)

## 2018-11-20 PROCEDURE — 99213 OFFICE O/P EST LOW 20 MIN: CPT | Performed by: PHYSICIAN ASSISTANT

## 2018-11-20 RX ORDER — CITALOPRAM 40 MG/1
40 TABLET ORAL DAILY
Qty: 30 TABLET | Refills: 5 | Status: SHIPPED | OUTPATIENT
Start: 2018-11-20 | End: 2019-01-11

## 2018-11-20 NOTE — PROGRESS NOTES
Subjective     Chief Complaint: anxiety and depression    History of Present Illness     Dawit Schmitz is a 28 y.o. male presenting with complaints of worsening anxiety and depression over the past several months. Symptoms dramatically worse over the past 2 weeks, patient had to leave work yesterday. He is currently on celexa 30mg. 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 In the past he's tried wellbutrin, prozac, buspar without good results. He is also interested in seeing a therapist. Denies SI, HI.    Patient has also not had any labs in the past several years, interested in checking cholesterol, thyroid, etc.    The following portions of the patient's history were reviewed and updated as appropriate: current medications, allergies, PMH.    Review of Systems   Constitutional: Positive for fatigue. Negative for appetite change, chills, fever and unexpected weight change.   HENT: Negative for congestion, ear pain, hearing loss, nosebleeds, sinus pressure, sore throat, tinnitus and trouble swallowing.    Eyes: Negative for photophobia, discharge and visual disturbance.   Respiratory: Negative for cough, chest tightness, shortness of breath and wheezing.    Cardiovascular: Negative for chest pain, palpitations and leg swelling.   Gastrointestinal: Negative for abdominal distention, abdominal pain, blood in stool, constipation, diarrhea, nausea and vomiting.   Endocrine: Negative for cold intolerance, heat intolerance, polydipsia, polyphagia and polyuria.   Genitourinary: Negative for difficulty urinating, dysuria, flank pain, frequency, hematuria and urgency.   Musculoskeletal: Negative.  Negative for arthralgias, back pain, joint swelling, myalgias, neck pain and neck stiffness.   Skin: Negative for color change, pallor, rash and wound.   Allergic/Immunologic: Negative for environmental allergies, food allergies and immunocompromised state.   Neurological: Negative for dizziness,  "weakness, numbness and headaches.   Hematological: Negative for adenopathy. Does not bruise/bleed easily.   Psychiatric/Behavioral: Positive for decreased concentration, dysphoric mood and sleep disturbance. Negative for hallucinations and suicidal ideas. The patient is nervous/anxious.        Objective     Vitals:    11/20/18 0857   BP: 122/77   Pulse: 76   Temp: 97.5 °F (36.4 °C)   SpO2: 97%   Weight: 129 kg (285 lb)   Height: 188 cm (74.02\")     Physical Exam   Constitutional: He is oriented to person, place, and time. He appears well-developed and well-nourished.   Cardiovascular: Normal rate and regular rhythm.   Pulmonary/Chest: Effort normal and breath sounds normal.   Neurological: He is alert and oriented to person, place, and time.   Skin: Skin is warm and dry.   Psychiatric: He has a normal mood and affect.     Assessment/Plan     Diagnoses and all orders for this visit:    Anxiety with depression  -     citalopram (CELEXA) 40 MG tablet; Take 1 tablet by mouth Daily.  -     Ambulatory Referral to Psychology  -     TSH  -     Genetic Testing - Saliva,; Future    Will increase celexa to 40mg, initiate counseling.    Healthcare maintenance  -     CBC & Differential  -     TSH  -     Vitamin D 25 hydroxy  -     Lipid panel  -     Vitamin B12  -     Comprehensive Metabolic Panel      Ashleigh Loja PA-C  11/20/2018         Please note that portions of this note were completed with a voice recognition program. Efforts were made to edit dictation, but occasionally words are mistranscribed.    "

## 2019-01-11 ENCOUNTER — OFFICE VISIT (OUTPATIENT)
Dept: INTERNAL MEDICINE | Facility: CLINIC | Age: 29
End: 2019-01-11

## 2019-01-11 VITALS
SYSTOLIC BLOOD PRESSURE: 139 MMHG | BODY MASS INDEX: 36.06 KG/M2 | OXYGEN SATURATION: 98 % | HEIGHT: 74 IN | TEMPERATURE: 97.3 F | WEIGHT: 281 LBS | DIASTOLIC BLOOD PRESSURE: 92 MMHG | HEART RATE: 87 BPM

## 2019-01-11 DIAGNOSIS — Z81.8 FAMILY HISTORY OF BIPOLAR DISORDER: ICD-10-CM

## 2019-01-11 DIAGNOSIS — F41.8 DEPRESSION WITH ANXIETY: Primary | ICD-10-CM

## 2019-01-11 PROCEDURE — 99214 OFFICE O/P EST MOD 30 MIN: CPT | Performed by: PHYSICIAN ASSISTANT

## 2019-01-11 RX ORDER — HYDROXYZINE HYDROCHLORIDE 25 MG/1
TABLET, FILM COATED ORAL
Qty: 45 TABLET | Refills: 2 | Status: SHIPPED | OUTPATIENT
Start: 2019-01-11 | End: 2019-03-20 | Stop reason: SDUPTHER

## 2019-01-11 RX ORDER — DESVENLAFAXINE SUCCINATE 50 MG/1
50 TABLET, EXTENDED RELEASE ORAL DAILY
Qty: 30 TABLET | Refills: 2 | Status: SHIPPED | OUTPATIENT
Start: 2019-01-11 | End: 2019-01-16
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
## 2019-01-16 RX ORDER — PAROXETINE HYDROCHLORIDE 20 MG/1
20 TABLET, FILM COATED ORAL DAILY
Qty: 30 TABLET | Refills: 5 | Status: SHIPPED | OUTPATIENT
Start: 2019-01-16 | End: 2019-03-20 | Stop reason: SDUPTHER

## 2019-02-20 ENCOUNTER — OFFICE VISIT (OUTPATIENT)
Dept: PSYCHIATRY | Facility: CLINIC | Age: 29
End: 2019-02-20

## 2019-02-20 VITALS — HEIGHT: 74 IN | BODY MASS INDEX: 40.04 KG/M2 | WEIGHT: 312 LBS

## 2019-02-20 DIAGNOSIS — F12.20 CANNABIS DEPENDENCE (HCC): ICD-10-CM

## 2019-02-20 DIAGNOSIS — F10.10 ALCOHOL ABUSE: ICD-10-CM

## 2019-02-20 DIAGNOSIS — F41.1 GENERALIZED ANXIETY DISORDER: Primary | ICD-10-CM

## 2019-02-20 PROCEDURE — 90792 PSYCH DIAG EVAL W/MED SRVCS: CPT | Performed by: NURSE PRACTITIONER

## 2019-02-20 RX ORDER — CLONIDINE HYDROCHLORIDE 0.1 MG/1
0.1 TABLET ORAL 2 TIMES DAILY
Qty: 60 TABLET | Refills: 0 | Status: SHIPPED | OUTPATIENT
Start: 2019-02-20 | End: 2019-03-20 | Stop reason: SDUPTHER

## 2019-02-20 NOTE — PROGRESS NOTES
Subjective   Dawit Schmitz is a 28 y.o. male who is here today for initial appointment to evaluate for medication options.     Chief Complaint:  Depression and anxiety     HPI:  History of Present Illness   Patient presents today for initial evaluation. He reports he wants to get back into therapy and get medication for depression and anxiety. Patient has experienced depression and anxiety since age 13. Patient reports his issues began at 13 when he stopped taking Ritalin. He reports he started ADHD treatment in the second grade. Patient reports he continued to do well in school after stopping Ritalin. Patient's PCP currently weaning patient off of Celexa and patient has started his first dose of Paxil. Patient currently denies depression. He reports Celexa managed his depression but did not help his anxiety. Patient reports excessive worrying, feels on edge and irritable at times, he constantly worries and anticipates about the next thing happening, and headaches. Patient reports he had passive suicidal thoughts a few months ago. He denies suicidal and homicidal thoughts. He reports he loves to live. He denies auditory and visual hallucinations. Patient reports sleep is fair. He reports he has nights were he has racing thoughts which make it difficult for him to fall asleep. Patient denies decreased need for sleep or increase in interest or activities.     Past Psych History: Some therapy when he was at [facility] 2008--2013. He has seen several different therapist over the past several years. He reports he would go until he would feel better and stop or he wasn't able to afford his copay at the time. He denies psychiatric hospitalizations. He denies suicide attempts.     Previous Psych Meds: Wellbutrin, Celexa, Paxil, Prozac, Buspar, Ritalin and Effexor.     Substance Abuse: Patient denies tobacco use. He reports will vape nicotine juice at times, last use three days ago. Patient drinks beer 6-8 per week, last use  yesterday. Patient smokes marijuana 1 gram daily, last use yesterday. Patient is minimizing of his alcohol and marijuana use.     Social History: Patient raised by mother and step-father. He reports a good relationship with family. Patient did not have a relationship with his biological father. Patient denies trauma or abuse. He denies legal issues. He has a Bachelor's Degree in Game [employer]. Patient is engaged for 4 1/2 years. He has never been  and has no children. He is Atheist. He is currently unemployed. 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 He enjoys playing video games, watching movies and playing with dogs.       Family Psychiatric History:  Family history is unknown by patient.    Medical/Surgical History:  Past Medical History:   Diagnosis Date   • Ankle sprain     left   • Anxiety    • Depression    • Fracture, foot     growth plate in right heel   • Rotator cuff syndrome     right   • Tennis elbow      No past surgical history on file.    Allergies   Allergen Reactions   • Prozac [Fluoxetine Hcl] Anxiety     Worsened anxiety.            Current Medications:   Current Outpatient Medications   Medication Sig Dispense Refill   • CloNIDine (CATAPRES) 0.1 MG tablet Take 1 tablet by mouth 2 (Two) Times a Day. 60 tablet 0   • hydrOXYzine (ATARAX) 25 MG tablet Take 1-2 tabs q6h prn anxiety. 45 tablet 2   • PARoxetine (PAXIL) 20 MG tablet Take 1 tablet by mouth Daily. 30 tablet 5     No current facility-administered medications for this visit.          Review of Systems   Constitutional: Positive for fatigue.   Eyes: Negative.    Respiratory: Negative.    Cardiovascular: Negative.    Gastrointestinal: Negative.    Endocrine: Negative.    Genitourinary: Negative.    Musculoskeletal: Negative.    Skin:        Multiple tattoos    Allergic/Immunologic: Negative.    Neurological: Positive for headaches.   Hematological: Negative.    Psychiatric/Behavioral: Positive for decreased  "concentration and sleep disturbance. The patient is nervous/anxious.     denies HEENT, cardiovascular, respiratory, liver, renal, GI/, endocrine, neuro, DERM, hematology, immunology, musculoskeletal disorders.    Objective   Physical Exam   Constitutional: He appears well-developed and well-nourished. No distress.   Vitals reviewed.    Height 188 cm (74\"), weight (!) 142 kg (312 lb).    Mental Status Exam:   Hygiene:   good  Cooperation:  Cooperative  Eye Contact:  Good  Psychomotor Behavior:  Appropriate  Affect:  Full range  Hopelessness: 4  Speech:  Normal  Thought Process:  Goal directed and Linear  Thought Content:  Mood congurent  Suicidal:  None  Homicidal:  None  Hallucinations:  None  Delusion:  None  Memory:  Intact  Orientation:  Person, Place, Time and Situation  Reliability:  good  Insight:  Fair  Judgement:  Fair  Impulse Control:  Fair  Physical/Medical Issues:  No       Short-term goals: Patient will be compliant with clinic appointments.  Patient will be engaged in therapy, medication compliant with minimal side effects. Patient  will report decrease of symptoms and frequency.    Long-term goals: Patient will have minimal symptoms of depression and anxiety with continued medication management. Patient will be compliant with treatment and appointments.       Problem list: Depression and Anxiety   Strengths: Motivated for treatment, educated   Weaknesses: Unemployed, poor coping skills, substance use     Assessment/Plan   Diagnoses and all orders for this visit:    Generalized anxiety disorder    Cannabis dependence (CMS/HCC)    Alcohol abuse    Other orders  -     CloNIDine (CATAPRES) 0.1 MG tablet; Take 1 tablet by mouth 2 (Two) Times a Day.        UDS positive for THC   Ed reviewed - Request # : [id number] - no red flags     Body mass index is 40.06 kg/m².  Patient was educated on healthier and more balanced diet choices and encouraged exercise within physical limitations.  Functionality: pt " having significant impairment in important areas of daily functioning.  Prognosis: Good dependent on medication/follow up and treatment plan compliance.    Impression: Patient experiencing worsening of anxiety and sleep disturbance at times.    Plan:  1. Continue Paxil 20 mg po daily   2. Begin Clonidine 0.1 mg po BID for racing thoughts, anxiety and physiological effects of anxiety on BP.  3. Begin psychotherapy  4. RTC in 4 weeks     Discussed medication options.  Discussed the risks, benefits, and side effects of the medication; client acknowledged and verbally consented.  Patient is aware to contact the [facility] with any worsening of symptom.  Patient is agreeable to go to the ER or call 911 should they begin SI/HI.

## 2019-03-20 ENCOUNTER — OFFICE VISIT (OUTPATIENT)
Dept: PSYCHIATRY | Facility: CLINIC | Age: 29
End: 2019-03-20

## 2019-03-20 VITALS — WEIGHT: 300 LBS | HEIGHT: 74 IN | BODY MASS INDEX: 38.5 KG/M2

## 2019-03-20 DIAGNOSIS — F10.10 ALCOHOL ABUSE: ICD-10-CM

## 2019-03-20 DIAGNOSIS — F41.1 GENERALIZED ANXIETY DISORDER: Primary | ICD-10-CM

## 2019-03-20 DIAGNOSIS — F12.20 CANNABIS DEPENDENCE (HCC): ICD-10-CM

## 2019-03-20 DIAGNOSIS — F33.1 MODERATE EPISODE OF RECURRENT MAJOR DEPRESSIVE DISORDER (HCC): ICD-10-CM

## 2019-03-20 PROCEDURE — 99214 OFFICE O/P EST MOD 30 MIN: CPT | Performed by: NURSE PRACTITIONER

## 2019-03-20 RX ORDER — PAROXETINE 30 MG/1
30 TABLET, FILM COATED ORAL DAILY
Qty: 30 TABLET | Refills: 1 | Status: SHIPPED | OUTPATIENT
Start: 2019-03-20 | End: 2019-07-03 | Stop reason: SDUPTHER

## 2019-03-20 RX ORDER — CLONIDINE HYDROCHLORIDE 0.1 MG/1
0.1 TABLET ORAL 2 TIMES DAILY
Qty: 60 TABLET | Refills: 0 | Status: SHIPPED | OUTPATIENT
Start: 2019-03-20 | End: 2019-06-19 | Stop reason: SDUPTHER

## 2019-03-20 RX ORDER — HYDROXYZINE HYDROCHLORIDE 25 MG/1
TABLET, FILM COATED ORAL
Qty: 60 TABLET | Refills: 1 | Status: SHIPPED | OUTPATIENT
Start: 2019-03-20 | End: 2019-07-03 | Stop reason: SDUPTHER

## 2019-03-20 NOTE — PROGRESS NOTES
"Subjective   Dawit Schmitz is a 28 y.o. male who is here today for a follow-up appointment    Chief Complaint: Follow-up  Depression and anxiety     HPI:  History of Present Illness   Patient presents for follow-up today. He reports an increase in depression and anxiety now that his PCP weaned him off Celexa and started him on Paxil. He has been on Paxil now for a month. He rates depression 6/10 and anxiety 8/10 with 10 being the worst. Sleep is fair. Appetite is fair. He reports no interest or motivation. Has to force himself to get out of bed. Feelings of hopelessness. He continues to have excessive worrying, restlessness and feeling on edge. No headaches since last visit. A couple of \"small\" panic attacks. He reports shakiness, chest tightness, rapid heart rate. He reports hydroxyzine is helpful when he takes two of them. He reports he started a new job which he likes. He is working as a cook at a new restaurant. He denies suicidal and homicidal thoughts. He reports he loves to live. He denies auditory and visual hallucinations.  He continues to use cannabis.  He continues to use alcohol.  He reports last drink a couple of beers a week ago.  Patient reports he did not start clonidine as they did not have the prescription at the pharmacy.    Family Psychiatric History:  Family history is unknown by patient.    Medical/Surgical History:  Past Medical History:   Diagnosis Date   • Ankle sprain     left   • Anxiety    • Depression    • Fracture, foot     growth plate in right heel   • Rotator cuff syndrome     right   • Tennis elbow      No past surgical history on file.    Allergies   Allergen Reactions   • Prozac [Fluoxetine Hcl] Anxiety     Worsened anxiety.            Current Medications:   Current Outpatient Medications   Medication Sig Dispense Refill   • CloNIDine (CATAPRES) 0.1 MG tablet Take 1 tablet by mouth 2 (Two) Times a Day. 60 tablet 0   • hydrOXYzine (ATARAX) 25 MG tablet Take 1-2 tabs q6h prn " "anxiety. 60 tablet 1   • PARoxetine (PAXIL) 30 MG tablet Take 1 tablet by mouth Daily. 30 tablet 1     No current facility-administered medications for this visit.          Review of Systems   Constitutional: Positive for fatigue.   Eyes: Negative.    Respiratory: Negative.    Cardiovascular: Negative.    Gastrointestinal: Negative.    Endocrine: Negative.    Genitourinary: Negative.    Musculoskeletal: Negative.    Skin:        Multiple tattoos    Allergic/Immunologic: Negative.    Hematological: Negative.    Psychiatric/Behavioral: Positive for decreased concentration and sleep disturbance. The patient is nervous/anxious.     denies HEENT, cardiovascular, respiratory, liver, renal, GI/, endocrine, neuro, DERM, hematology, immunology, musculoskeletal disorders.    Objective   Physical Exam   Constitutional: He appears well-developed and well-nourished. No distress.   Vitals reviewed.    Height 188 cm (74\"), weight 136 kg (300 lb).    Mental Status Exam:   Hygiene:   good  Cooperation:  Cooperative  Eye Contact:  Good  Psychomotor Behavior:  Appropriate  Affect:  Full range  Hopelessness: 4  Speech:  Normal  Thought Process:  Goal directed and Linear  Thought Content:  Mood congurent  Suicidal:  None  Homicidal:  None  Hallucinations:  None  Delusion:  None  Memory:  Intact  Orientation:  Person, Place, Time and Situation  Reliability:  good  Insight:  Fair  Judgement:  Fair  Impulse Control:  Fair  Physical/Medical Issues:  No       Assessment/Plan   Diagnoses and all orders for this visit:    Generalized anxiety disorder  -     CloNIDine (CATAPRES) 0.1 MG tablet; Take 1 tablet by mouth 2 (Two) Times a Day.  -     hydrOXYzine (ATARAX) 25 MG tablet; Take 1-2 tabs q6h prn anxiety.  -     PARoxetine (PAXIL) 30 MG tablet; Take 1 tablet by mouth Daily.    Moderate episode of recurrent major depressive disorder (CMS/HCC)  -     PARoxetine (PAXIL) 30 MG tablet; Take 1 tablet by mouth Daily.    Cannabis dependence " (CMS/[facility])    Alcohol abuse        Body mass index is 38.52 kg/m².  Patient was educated on healthier and more balanced diet choices and encouraged exercise within physical limitations.  Functionality: pt having significant impairment in important areas of daily functioning.  Prognosis: Good dependent on medication/follow up and treatment plan compliance.    Impression: Patient experiencing worsening of anxiety, depression and sleep disturbance.    Plan:  1. Increase Paxil 30 mg po daily for depression and anxiety.   2. Begin Clonidine 0.1 mg po BID for racing thoughts, anxiety and physiological effects of anxiety on BP.  3. Begin psychotherapy as scheduled   4. RTC in 1 month  5. Continue Atarax 25 mg 1-2 po Q6 hr prn anxiety or panic.    Discussed medication options.  Discussed the risks, benefits, and side effects of the medication; client acknowledged and verbally consented.  Patient is aware to contact the [facility] with any worsening of symptom.  Patient is agreeable to go to the ER or call 911 should they begin SI/HI.

## 2019-06-13 RX ORDER — CLONIDINE HYDROCHLORIDE 0.1 MG/1
TABLET ORAL
Qty: 60 TABLET | Refills: 0 | OUTPATIENT
Start: 2019-06-13

## 2019-06-19 DIAGNOSIS — F41.1 GENERALIZED ANXIETY DISORDER: ICD-10-CM

## 2019-06-19 RX ORDER — CLONIDINE HYDROCHLORIDE 0.1 MG/1
0.1 TABLET ORAL 2 TIMES DAILY
Qty: 60 TABLET | Refills: 0 | Status: SHIPPED | OUTPATIENT
Start: 2019-06-19 | End: 2019-07-03 | Stop reason: SDUPTHER

## 2019-07-03 ENCOUNTER — OFFICE VISIT (OUTPATIENT)
Dept: PSYCHIATRY | Facility: CLINIC | Age: 29
End: 2019-07-03

## 2019-07-03 VITALS — HEIGHT: 74 IN | WEIGHT: 315 LBS | BODY MASS INDEX: 40.43 KG/M2

## 2019-07-03 DIAGNOSIS — F41.1 GENERALIZED ANXIETY DISORDER: ICD-10-CM

## 2019-07-03 DIAGNOSIS — F33.1 MODERATE EPISODE OF RECURRENT MAJOR DEPRESSIVE DISORDER (HCC): ICD-10-CM

## 2019-07-03 PROCEDURE — 99213 OFFICE O/P EST LOW 20 MIN: CPT | Performed by: NURSE PRACTITIONER

## 2019-07-03 RX ORDER — HYDROXYZINE HYDROCHLORIDE 25 MG/1
TABLET, FILM COATED ORAL
Qty: 60 TABLET | Refills: 2 | Status: SHIPPED | OUTPATIENT
Start: 2019-07-03 | End: 2021-04-06 | Stop reason: DRUGHIGH

## 2019-07-03 RX ORDER — CLONIDINE HYDROCHLORIDE 0.1 MG/1
0.1 TABLET ORAL 2 TIMES DAILY
Qty: 60 TABLET | Refills: 2 | Status: SHIPPED | OUTPATIENT
Start: 2019-07-03 | End: 2021-04-06

## 2019-07-03 RX ORDER — PAROXETINE 30 MG/1
30 TABLET, FILM COATED ORAL DAILY
Qty: 30 TABLET | Refills: 2 | Status: SHIPPED | OUTPATIENT
Start: 2019-07-03 | End: 2021-04-06

## 2019-07-03 NOTE — PROGRESS NOTES
Subjective   Dawit Schmitz is a 29 y.o. male who is here today for a follow-up appointment    Chief Complaint: Follow-up  Depression and anxiety     HPI:  History of Present Illness   Patient presents for follow-up today. Patient reports medications are working well. He reports he can really tell the Clonidine has helped. He reports being out of it for a couple of weeks and his panic symptoms came back. He denies depression. Reports it is managed. He reports occassionally he will have a bad day. He reports anxiety is manageable with Clonidine. No further panic attacks as long as he takes Clonidine. He shares he has plans to go out of town to visit his parents and is looking forward to this. He reports sleep and appetite are good. He denies SI/HI/AH/VH. He reports tolerating medications without side effects.     Family Psychiatric History:  Family history is unknown by patient.    Medical/Surgical History:  Past Medical History:   Diagnosis Date   • Ankle sprain     left   • Anxiety    • Depression    • Fracture, foot     growth plate in right heel   • Rotator cuff syndrome     right   • Tennis elbow      No past surgical history on file.    Allergies   Allergen Reactions   • Prozac [Fluoxetine Hcl] Anxiety     Worsened anxiety.            Current Medications:   Current Outpatient Medications   Medication Sig Dispense Refill   • CloNIDine (CATAPRES) 0.1 MG tablet Take 1 tablet by mouth 2 (Two) Times a Day. 60 tablet 2   • hydrOXYzine (ATARAX) 25 MG tablet Take 1-2 tabs q6h prn anxiety. 60 tablet 2   • PARoxetine (PAXIL) 30 MG tablet Take 1 tablet by mouth Daily. 30 tablet 2     No current facility-administered medications for this visit.          Review of Systems   Constitutional: Negative.    Eyes: Negative.    Respiratory: Negative.    Cardiovascular: Negative.    Gastrointestinal: Negative.    Endocrine: Negative.    Genitourinary: Negative.    Musculoskeletal: Negative.    Skin:        Multiple tattoos   "  Allergic/Immunologic: Negative.    Neurological: Negative.    Hematological: Negative.    Psychiatric/Behavioral: The patient is nervous/anxious.     denies HEENT, cardiovascular, respiratory, liver, renal, GI/, endocrine, neuro, DERM, hematology, immunology, musculoskeletal disorders.    Objective   Physical Exam   Constitutional: He appears well-developed and well-nourished. No distress.   Vitals reviewed.    Height 188 cm (74\"), weight (!) 153 kg (338 lb).    Mental Status Exam:   Hygiene:   good  Cooperation:  Cooperative  Eye Contact:  Good  Psychomotor Behavior:  Appropriate  Affect:  Full range  Hopelessness: Denies  Speech:  Normal  Thought Process:  Goal directed and Linear  Thought Content:  Normal  Suicidal:  None  Homicidal:  None  Hallucinations:  None  Delusion:  None  Memory:  Intact  Orientation:  Person, Place, Time and Situation  Reliability:  good  Insight:  Fair  Judgement:  Fair  Impulse Control:  Fair  Physical/Medical Issues:  No       Assessment/Plan   Diagnoses and all orders for this visit:    Generalized anxiety disorder  -     CloNIDine (CATAPRES) 0.1 MG tablet; Take 1 tablet by mouth 2 (Two) Times a Day.  -     hydrOXYzine (ATARAX) 25 MG tablet; Take 1-2 tabs q6h prn anxiety.  -     PARoxetine (PAXIL) 30 MG tablet; Take 1 tablet by mouth Daily.    Moderate episode of recurrent major depressive disorder (CMS/HCC)  -     PARoxetine (PAXIL) 30 MG tablet; Take 1 tablet by mouth Daily.        Body mass index is 43.4 kg/m².  Patient was educated on healthier and more balanced diet choices and encouraged exercise within physical limitations.  Functionality: pt having significant impairment in important areas of daily functioning.  Prognosis: Good dependent on medication/follow up and treatment plan compliance.    Impression: Patient experiencing worsening of anxiety, depression and sleep disturbance.    Plan:  1. Continue Paxil 30 mg po daily for depression and anxiety.   2. Begin Clonidine " 0.1 mg po BID for racing thoughts, anxiety and physiological effects of anxiety on BP.  3. Continue Atarax 25 mg 1-2 po Q6 hr prn anxiety or panic.  4. RTC in 3 months     Discussed medication options.  Discussed the risks, benefits, and side effects of the medication; client acknowledged and verbally consented.  Patient is aware to contact the [facility] with any worsening of symptom.  Patient is agreeable to go to the ER or call 911 should they begin SI/HI.

## 2019-08-22 ENCOUNTER — OFFICE VISIT (OUTPATIENT)
Dept: PSYCHIATRY | Facility: CLINIC | Age: 29
End: 2019-08-22

## 2019-08-22 DIAGNOSIS — F10.10 ALCOHOL ABUSE: ICD-10-CM

## 2019-08-22 DIAGNOSIS — F12.20 CANNABIS DEPENDENCE (HCC): ICD-10-CM

## 2019-08-22 DIAGNOSIS — F41.1 GENERALIZED ANXIETY DISORDER: Primary | ICD-10-CM

## 2019-08-22 DIAGNOSIS — F33.1 MODERATE EPISODE OF RECURRENT MAJOR DEPRESSIVE DISORDER (HCC): ICD-10-CM

## 2019-08-22 PROCEDURE — 90791 PSYCH DIAGNOSTIC EVALUATION: CPT | Performed by: SOCIAL WORKER

## 2019-08-22 NOTE — PROGRESS NOTES
"  Subjective   Patient ID: Dawit Schmitz is a 29 y.o. male presenting to [facility] for an initial evaluation.    Time: 1:01 pm -1:57 pm    Chief Complaint: Anxiety    Presenting Concern(s)   The patient is referred by the psychiatric nurse practitioner for anxiety and some depression.  The patient shares that he has had anxiety for much of his life and believes that's the most difficult to deal with.  Patient shares he has difficulty being able to shot his thoughts off and often ruminates on current worries.  He shares that he spends \"a lot of time in my head\" as he shares current symptoms.  He shares that he feels on edge and thinks something bad will happen.  He shares that he also has periods of low mood irritability and at times suicidal thoughts that are fleeting and not present today and medicaiton has helped with this..  Patient shares that he has a very high sex drive and has some question if that is normal.  He reports that he has plans for his future and he enjoys his life.  He denies auditory and visual hallucinations he denies symptoms of OCD.  Typical day included  Getting up 1 hour before work then drink, smokes THC then  play video games.  Smokes a little but more THC on his off days.  He shares that the THC helps with anxiety.      Treatment History (all levels of care):  Several therapist in the past and at [facility] [id number].  No inpatient hospitalizations.  No suicide attempts or self harm.Previous medication trials include; Celexa, Wellbutrin, Prozac, Ritalin and Effexor.  Interpersonal/Family Relationships: Good  Family History  Mental Illness and/or Substance Abuse:     Patient reports relationship with family is good. Patient was informed of the option to involve family in treatment at [facility] and was also encouraged to do so.     Personal/Social History: PT was born and raised in [place] by Mom and step-dad.  Loved to [place] " "when he was 19He reports that he has a good relationship with his family.  He does not have a relationship with his biological father.   In a relationship for 5 yts and is engaged.  Patient's highest level of education BA in [employer].  PT worked as a  which he enjoyed but did not pay enough.  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    Social History     Socioeconomic History   • Marital status: Single     Spouse name: Not on file   • Number of children: Not on file   • Years of education: Not on file   • Highest education level: Not on file   Occupational History   • Occupation:      Comment: lockbox   Tobacco Use   • Smoking status: Never Smoker   Substance and Sexual Activity   • Alcohol use: No   • Drug use: No     Types: Marijuana   • Sexual activity: Defer      Experience: Yes  Abuse History: No  Legal History: No   Court-ordered: No  History of Substance Use:     Patient answered \"yes\" to experiencing the one or more of the following problems related to substance use: trouble quitting, financial problems, increased thought about using, work and/or school problems, inability to stay off drugs and/or alcohol, relapse, family problems, cravings, feelings of guilt or remorse about use, times when used and/or drank alone, using more than intended, and black outs and/or memory issues when using.   Drank age 19 to help with depression. Stopped drinking for 2.5 yrs- when he moved back to [place] and financially strapped.  Started drinking again about 4 months ago with a after work of a stressful job. Increased the amount and frequency and he is trying to cut it back but currently drinking 6-8 beers at night.  He reports daily marijuana use usually 1 g a day.  Patient continues to minimize his alcohol and marijuana use.  THC helps with anxiety and really likes to smoke anything.  History of DTs: no  History of Seizures: no    Past Medical History:   Diagnosis Date   • " Ankle sprain     left   • Anxiety    • Depression    • Fracture, foot     growth plate in right heel   • Rotator cuff syndrome     right   • Tennis elbow      Objective   Psychological ROS: positive for - anxiety and depression  Mental Status Exam  Hygiene:  good  Dress:  casual  Attitude:  Cooperative  Motor Activity:  Appropriate  Speech:  Normal  Mood:  anxious and depressed  Affect:  anxious  Thought Processes:  Goal directed and Linear  Thought Content:  normal  Suicidal Thoughts:  denies  Homicidal Thoughts:  denies  Crisis Safety Plan: Yes, to come to the emergency room.  Hallucinations:  denies    Patient identified the following problems:     Anxiety, chemical dep, depression and finacial conflict/vocation stress    Short term goals: Develop rapport and encourage compliance with treatment plan and followup. The patient to contact this office, call 911, or present to the nearest emergency room should suicidal or homicidal ideations occur.    Long term goals: Patient will learn and utilized positive coping skills to avoid or manage high risk situations that threaten his/her sobriety from alcohol and other substances. Patient will develop a network of support in the community. Patient will be able to function at optimal levels without the need for continued treatment at this level of care.    Strengths: Literate, Employed and Articulate    Weaknesses:Poor insight, Substance use and Poor coping skills    Resources/Assets: Employed, Educated, Intelligent, Articulate, Motivated for treatment  and Ability to read/write    VISIT DIAGNOSIS:     ICD-10-CM ICD-9-CM   1. Generalized anxiety disorder F41.1 300.02   2. Moderate episode of recurrent major depressive disorder (CMS/HCC) F33.1 296.32   3. Cannabis dependence (CMS/HCC) F12.20 304.30   4. Alcohol abuse F10.10 305.00        Plan:  Patient will continue in weekly individual psychotherapy sessions as scheduled at [facility]. Patient  to be assessed and monitored by Carla Brown psychiatric nurse practiitoner. Patient will adhere to medication regimen as prescribed and report any adverse side effects. Patient will contact this office, call 911, or present to the nearest emergency room should suicidal or homicidal ideations occur. Therapist will use Motivation Interviewing, Cognitive Behavioral Therapy, and Solution Focused Therapy to assist patient with improving functioning, maintaining stability, and avoiding decompensation and the need for higher level of care.       Deysi Prasad, DRISS, [facility]

## 2019-09-26 ENCOUNTER — OFFICE VISIT (OUTPATIENT)
Dept: PSYCHIATRY | Facility: CLINIC | Age: 29
End: 2019-09-26

## 2019-09-26 DIAGNOSIS — F41.1 GENERALIZED ANXIETY DISORDER: Primary | ICD-10-CM

## 2019-09-26 DIAGNOSIS — F12.20 CANNABIS DEPENDENCE (HCC): ICD-10-CM

## 2019-09-26 DIAGNOSIS — F33.1 MODERATE EPISODE OF RECURRENT MAJOR DEPRESSIVE DISORDER (HCC): ICD-10-CM

## 2019-09-26 PROCEDURE — 90837 PSYTX W PT 60 MINUTES: CPT | Performed by: SOCIAL WORKER

## 2021-04-06 ENCOUNTER — LAB (OUTPATIENT)
Dept: LAB | Facility: HOSPITAL | Age: 31
End: 2021-04-06

## 2021-04-06 ENCOUNTER — OFFICE VISIT (OUTPATIENT)
Dept: INTERNAL MEDICINE | Facility: CLINIC | Age: 31
End: 2021-04-06

## 2021-04-06 ENCOUNTER — HOSPITAL ENCOUNTER (OUTPATIENT)
Dept: GENERAL RADIOLOGY | Facility: HOSPITAL | Age: 31
Discharge: HOME | End: 2021-04-06

## 2021-04-06 VITALS
RESPIRATION RATE: 18 BRPM | BODY MASS INDEX: 41.75 KG/M2 | TEMPERATURE: 97.5 F | HEART RATE: 69 BPM | SYSTOLIC BLOOD PRESSURE: 140 MMHG | HEIGHT: 73 IN | DIASTOLIC BLOOD PRESSURE: 102 MMHG | WEIGHT: 315 LBS | OXYGEN SATURATION: 98 %

## 2021-04-06 DIAGNOSIS — Z00.8 EVALUATION BY PSYCHIATRIC SERVICE REQUIRED: ICD-10-CM

## 2021-04-06 DIAGNOSIS — R07.81 RIB PAIN: ICD-10-CM

## 2021-04-06 DIAGNOSIS — F41.9 ANXIETY: ICD-10-CM

## 2021-04-06 DIAGNOSIS — F90.2 ATTENTION DEFICIT HYPERACTIVITY DISORDER (ADHD), COMBINED TYPE: ICD-10-CM

## 2021-04-06 DIAGNOSIS — E03.9 ACQUIRED HYPOTHYROIDISM: ICD-10-CM

## 2021-04-06 DIAGNOSIS — F32.4 MAJOR DEPRESSIVE DISORDER WITH SINGLE EPISODE, IN PARTIAL REMISSION (HCC): Chronic | ICD-10-CM

## 2021-04-06 DIAGNOSIS — E03.9 ACQUIRED HYPOTHYROIDISM: Primary | ICD-10-CM

## 2021-04-06 DIAGNOSIS — R03.0 ELEVATED BP WITHOUT DIAGNOSIS OF HYPERTENSION: ICD-10-CM

## 2021-04-06 PROBLEM — M72.2 PLANTAR FASCIITIS: Status: RESOLVED | Noted: 2017-04-17 | Resolved: 2021-04-06

## 2021-04-06 PROBLEM — F32.A DEPRESSION: Chronic | Status: ACTIVE | Noted: 2017-04-17

## 2021-04-06 PROBLEM — K21.9 GASTROESOPHAGEAL REFLUX DISEASE: Status: RESOLVED | Noted: 2017-04-17 | Resolved: 2021-04-06

## 2021-04-06 PROCEDURE — 99214 OFFICE O/P EST MOD 30 MIN: CPT | Performed by: NURSE PRACTITIONER

## 2021-04-06 PROCEDURE — 71101 X-RAY EXAM UNILAT RIBS/CHEST: CPT

## 2021-04-06 PROCEDURE — 84439 ASSAY OF FREE THYROXINE: CPT | Performed by: NURSE PRACTITIONER

## 2021-04-06 PROCEDURE — 84443 ASSAY THYROID STIM HORMONE: CPT | Performed by: NURSE PRACTITIONER

## 2021-04-06 RX ORDER — NAPROXEN 500 MG/1
500 TABLET ORAL 2 TIMES DAILY PRN
Qty: 60 TABLET | Refills: 0 | Status: SHIPPED | OUTPATIENT
Start: 2021-04-06 | End: 2021-05-04 | Stop reason: SDUPTHER

## 2021-04-06 RX ORDER — LEVOTHYROXINE SODIUM 0.03 MG/1
25 TABLET ORAL DAILY
COMMUNITY
Start: 2021-03-25 | End: 2021-04-06 | Stop reason: SDUPTHER

## 2021-04-06 RX ORDER — HYDROXYZINE PAMOATE 50 MG/1
50 CAPSULE ORAL DAILY PRN
COMMUNITY
Start: 2021-03-25 | End: 2021-09-08

## 2021-04-06 RX ORDER — LEVOTHYROXINE SODIUM 0.03 MG/1
25 TABLET ORAL DAILY
Start: 2021-04-06 | End: 2021-05-23 | Stop reason: SDUPTHER

## 2021-04-06 NOTE — PROGRESS NOTES
"Subjective   Dawit Schmitz is a 30 y.o. male here to establish care.  Chief Complaint   Patient presents with   • Establish Care   • left rib pain     1.5 month.  Hurt during wrestling training   • ADHD     discuss psych referral due to ADD/ADHD   • Hypothyroidism       History of Present Illness     Left Rib pain -   Was at wrestling training about a month ago and was trying to do an elbow drop.   He landed on the edge of the \"crash pad\" and felt like something popped or caught in his left chest.   Has had intermittent pain since. He though this was better but he was rolling around with a training partner last weekend and feels like the heel of his partner hit him in the chest and caused significant pain which took his breath away for a minute.   Pain can be worse with certain positions or trying to get up quickly.   He has tried Ice, heat, ibuprofen which has really helped a lot.   He has upcoming wresting show and wasn't to have area checked out before.       Hypothyroidism- diagnosed about 2 years ago on routine labs. He was not symptomatic.  Current symptoms: nervousness, weight changes and losing weight intentionally . Patient denies change in energy level, diarrhea, heat / cold intolerance and palpitations. Symptoms have been well-controlled.  Currently on levothyroxine- he has been cutting dose in half for a few days because he was running out of medication. Otherwise is typically compliant      ADHD/anxiety/depression- diagnosed with ADHD when in 2nd grade.   Was on ritalin until 9th grade.   He has been anxious as long as he can remember.   He is on hydroxyzine. Uses PRN. Has not take yet today.   Did not sleep well last night.   Has felt off for a couple days - describes as walking in a haze or dissociates a lot.   Feels like existing half in body and half out.   Has a fear of death.      UNIQUE 7 score 21 in office- see flowsheet  PHQ-9 Depression Screening  Little interest or pleasure in doing things? " 1   Feeling down, depressed, or hopeless? 1   Trouble falling or staying asleep, or sleeping too much? 3   Feeling tired or having little energy? 3   Poor appetite or overeating? 2   Feeling bad about yourself - or that you are a failure or have let yourself or your family down? 3   Trouble concentrating on things, such as reading the newspaper or watching television? 2   Moving or speaking so slowly that other people could have noticed? Or the opposite - being so fidgety or restless that you have been moving around a lot more than usual? 2   Thoughts that you would be better off dead, or of hurting yourself in some way? 0   PHQ-9 Total Score 17   If you checked off any problems, how difficult have these problems made it for you to do your work, take care of things at home, or get along with other people? Somewhat difficult           The following portions of the patient's history were reviewed and updated as appropriate: allergies, current medications, past family history, past medical history, past social history, past surgical history and problem list.    Review of Systems   Constitutional: Positive for fatigue. Negative for activity change, appetite change, fever, unexpected weight gain and unexpected weight loss.        Has been losing weight intentionally     Eyes: Negative for visual disturbance.   Respiratory: Negative for cough, chest tightness, shortness of breath and wheezing.    Cardiovascular: Negative for chest pain.   Gastrointestinal: Negative for abdominal pain, nausea and vomiting.   Genitourinary: Negative for difficulty urinating, frequency and urgency.   Musculoskeletal: Positive for arthralgias and myalgias.   Skin: Negative for color change and rash.   Neurological: Negative for dizziness, weakness and headache.   Psychiatric/Behavioral: Positive for depressed mood and stress. Negative for self-injury and suicidal ideas. The patient is nervous/anxious.            Allergies   Allergen Reactions  "  • Prozac [Fluoxetine Hcl] Anxiety     Worsened anxiety.      Past Medical History:   Diagnosis Date   • Acquired hypothyroidism 4/6/2021   • Ankle sprain     left   • Anxiety    • Depression    • Fracture, foot     growth plate in right heel   • Gastroesophageal reflux disease 4/17/2017   • Hypothyroidism    • Plantar fasciitis 4/17/2017   • Rotator cuff syndrome     right   • Tennis elbow      History reviewed. No pertinent surgical history.  Family History   Problem Relation Age of Onset   • Diabetes Paternal Uncle    • Lung cancer Maternal Grandmother    • Hypertension Mother    • Thyroid disease Mother      Social History     Socioeconomic History   • Marital status: Single     Spouse name: Not on file   • Number of children: Not on file   • Years of education: Not on file   • Highest education level: Not on file   Tobacco Use   • Smoking status: Former Smoker   • Smokeless tobacco: Former User     Types: Snuff, Chew   Substance and Sexual Activity   • Alcohol use: No   • Drug use: Yes     Types: Marijuana   • Sexual activity: Yes     Immunization History   Administered Date(s) Administered   • Hep B, Adolescent or Pediatric 10/05/2000, 11/09/2000       Current Outpatient Medications:   •  hydrOXYzine pamoate (VISTARIL) 50 MG capsule, Take 50 mg by mouth Daily As Needed., Disp: , Rfl:   •  levothyroxine (SYNTHROID, LEVOTHROID) 25 MCG tablet, Take 1 tablet by mouth Daily. 200001, Disp: , Rfl:   •  naproxen (Naprosyn) 500 MG tablet, Take 1 tablet by mouth 2 (Two) Times a Day As Needed for Moderate Pain ., Disp: 60 tablet, Rfl: 0    Objective   Blood pressure (!) 140/102, pulse 69, temperature 97.5 °F (36.4 °C), resp. rate 18, height 186.1 cm (73.25\"), weight (!) 175 kg (386 lb), SpO2 98 %.  Physical Exam  Vitals and nursing note reviewed.   Constitutional:       Appearance: Normal appearance. He is well-developed and well-groomed. He is morbidly obese.   HENT:      Head: Normocephalic and atraumatic.   Eyes:     "  Conjunctiva/sclera: Conjunctivae normal.      Pupils: Pupils are equal, round, and reactive to light.   Cardiovascular:      Rate and Rhythm: Normal rate and regular rhythm.      Chest Wall: PMI is not displaced. No thrill.      Heart sounds: Normal heart sounds.   Pulmonary:      Effort: Pulmonary effort is normal. No tachypnea, bradypnea, accessory muscle usage or respiratory distress.      Breath sounds: Normal breath sounds.   Chest:      Chest wall: Tenderness ( mild tenderness as marked on diagram ) present. No mass, lacerations, deformity, swelling, crepitus or edema.       Abdominal:      General: Abdomen is protuberant. Bowel sounds are normal. There is no distension.      Palpations: Abdomen is soft.      Tenderness: There is no abdominal tenderness.   Musculoskeletal:      Cervical back: Normal range of motion and neck supple.      Right lower leg: No edema.      Left lower leg: No edema.   Skin:     General: Skin is warm and dry.      Coloration: Skin is not ashen or jaundiced.      Findings: No ecchymosis.   Neurological:      Mental Status: He is alert and oriented to person, place, and time.   Psychiatric:         Attention and Perception: Attention normal.         Mood and Affect: Affect normal. Mood is anxious.         Speech: Speech normal.         Behavior: Behavior normal. Behavior is cooperative.         Thought Content: Thought content normal. Thought content does not include homicidal or suicidal ideation.         Cognition and Memory: Cognition and memory normal.         Judgment: Judgment normal.         Assessment/Plan   Diagnoses and all orders for this visit:    1. Acquired hypothyroidism (Primary)  Assessment & Plan:  He is clinically euthyroid.  We will check his thyroid labs and continue the levothyroxine at current dose for now.  May require dose adjustment if labs indicate.  He is not a refill today and will let me know if he needs a refill prior to lab results being  received    Orders:  -     TSH Rfx On Abnormal To Free T4; Future  -     T4, Free; Future  -     levothyroxine (SYNTHROID, LEVOTHROID) 25 MCG tablet; Take 1 tablet by mouth Daily. 200001    2. Rib pain  Assessment & Plan:  We will have him check an x-ray.  He can complete this at [address] at the diagnostic center.  Will notify of results once received.  He can use naproxen with food as needed for the pain.  He can also use Tylenol but have advised him to avoid ibuprofen in conjunction with the naproxen.  Adverse effects discussed.    Orders:  -     XR Ribs Left With PA Chest; Future  -     naproxen (Naprosyn) 500 MG tablet; Take 1 tablet by mouth 2 (Two) Times a Day As Needed for Moderate Pain .  Dispense: 60 tablet; Refill: 0    3. Anxiety  Assessment & Plan:  Anxiety is ongoing.  He does respond well to hydroxyzine.  He has a prescription for this and does not need refills today.  Will refer over to psychiatry for further evaluation and management.  He did have [employer] testing to evaluate what medications may work with him.  If he would like to start medications prior to seeing psychiatry he will bring this and we can discuss further.  Otherwise will defer to psychiatry      4. Evaluation by psychiatric service required  -     Ambulatory Referral to Psychiatry    5. Attention deficit hyperactivity disorder (ADHD), combined type  Comments:  diagnosed in 2nd grade  Assessment & Plan:  Refer to psychiatry for evaluation and management.  Unable to prescribe medications for ADHD and he will need to get them from psychiatry if medications are indicated.    Orders:  -     Ambulatory Referral to Psychiatry    6. Elevated BP without diagnosis of hypertension  Assessment & Plan:  BP elevated in office.  No history of hypertension.  Will recheck at follow-up and consider adding medications if needed      7. Major depressive disorder with single episode, in partial remission (CMS/[facility])  Assessment & Plan:  Patient's  depression is recurrent and is moderate without psychosis. Their depression is currently active and the condition is unchanged. This will be reassessed at the next regular appointment. F/U as described:patient referred to Mental Health Specialist   He would like to talk to psychiatry about starting medications.  He had [employer] testing and would like to review that and possibly she is a medication that has shown to be effective for him.  I will refer to psychiatry for further evaluation and management.  If he would like to start medications prior to his psychiatry appointment he can follow-up with me with a copy of his gene site testing we can discuss again             Return in about 6 weeks (around 5/18/2021) for Recheck, collect labs today.  Plan of care discussed with pt. They verbalized understanding and agreement.       * Please note that portions of this note were completed with a voice recognition program. Efforts were made to edit the dictation but occasionally words are erroneously transcribed.

## 2021-04-06 NOTE — ASSESSMENT & PLAN NOTE
BP elevated in office.  No history of hypertension.  Will recheck at follow-up and consider adding medications if needed

## 2021-04-06 NOTE — ASSESSMENT & PLAN NOTE
We will have him check an x-ray.  He can complete this at [address] at the diagnostic center.  Will notify of results once received.  He can use naproxen with food as needed for the pain.  He can also use Tylenol but have advised him to avoid ibuprofen in conjunction with the naproxen.  Adverse effects discussed.

## 2021-04-06 NOTE — ASSESSMENT & PLAN NOTE
He is clinically euthyroid.  We will check his thyroid labs and continue the levothyroxine at current dose for now.  May require dose adjustment if labs indicate.  He is not a refill today and will let me know if he needs a refill prior to lab results being received

## 2021-04-06 NOTE — ASSESSMENT & PLAN NOTE
Refer to psychiatry for evaluation and management.  Unable to prescribe medications for ADHD and he will need to get them from psychiatry if medications are indicated.

## 2021-04-06 NOTE — ASSESSMENT & PLAN NOTE
Health Maintenance Due   Topic Date Due   • Shingles Vaccine (1 of 2) 07/19/2016       Patient is due for topics as listed above but is not proceeding with Immunization(s) Shingles at this time. Education provided for Immunization(s) Shingles.             Patient's depression is recurrent and is moderate without psychosis. Their depression is currently active and the condition is unchanged. This will be reassessed at the next regular appointment. F/U as described:patient referred to Mental Health Specialist   He would like to talk to psychiatry about starting medications.  He had [employer] testing and would like to review that and possibly she is a medication that has shown to be effective for him.  I will refer to psychiatry for further evaluation and management.  If he would like to start medications prior to his psychiatry appointment he can follow-up with me with a copy of his gene site testing we can discuss again

## 2021-04-06 NOTE — ASSESSMENT & PLAN NOTE
Anxiety is ongoing.  He does respond well to hydroxyzine.  He has a prescription for this and does not need refills today.  Will refer over to psychiatry for further evaluation and management.  He did have [employer] testing to evaluate what medications may work with him.  If he would like to start medications prior to seeing psychiatry he will bring this and we can discuss further.  Otherwise will defer to psychiatry

## 2021-04-07 LAB
T4 FREE SERPL-MCNC: 1 NG/DL (ref 0.93–1.7)
TSH SERPL DL<=0.05 MIU/L-ACNC: 5.95 UIU/ML (ref 0.27–4.2)

## 2021-05-04 ENCOUNTER — OFFICE VISIT (OUTPATIENT)
Dept: INTERNAL MEDICINE | Facility: CLINIC | Age: 31
End: 2021-05-04

## 2021-05-04 VITALS
OXYGEN SATURATION: 98 % | HEIGHT: 73 IN | RESPIRATION RATE: 20 BRPM | HEART RATE: 88 BPM | DIASTOLIC BLOOD PRESSURE: 70 MMHG | BODY MASS INDEX: 41.75 KG/M2 | WEIGHT: 315 LBS | SYSTOLIC BLOOD PRESSURE: 138 MMHG | TEMPERATURE: 97.6 F

## 2021-05-04 DIAGNOSIS — M54.2 NECK PAIN, ACUTE: Primary | ICD-10-CM

## 2021-05-04 DIAGNOSIS — R07.81 RIB PAIN: ICD-10-CM

## 2021-05-04 PROCEDURE — 99213 OFFICE O/P EST LOW 20 MIN: CPT | Performed by: NURSE PRACTITIONER

## 2021-05-04 PROCEDURE — 96372 THER/PROPH/DIAG INJ SC/IM: CPT | Performed by: NURSE PRACTITIONER

## 2021-05-04 RX ORDER — KETOROLAC TROMETHAMINE 30 MG/ML
60 INJECTION, SOLUTION INTRAMUSCULAR; INTRAVENOUS ONCE
Status: COMPLETED | OUTPATIENT
Start: 2021-05-04 | End: 2021-05-04

## 2021-05-04 RX ORDER — NAPROXEN 500 MG/1
500 TABLET ORAL 2 TIMES DAILY PRN
Qty: 60 TABLET | Refills: 1 | Status: SHIPPED | OUTPATIENT
Start: 2021-05-04 | End: 2022-06-29 | Stop reason: SDUPTHER

## 2021-05-04 RX ORDER — CYCLOBENZAPRINE HCL 10 MG
10 TABLET ORAL 3 TIMES DAILY PRN
Qty: 30 TABLET | Refills: 0 | Status: SHIPPED | OUTPATIENT
Start: 2021-05-04 | End: 2021-09-08 | Stop reason: SDUPTHER

## 2021-05-04 RX ORDER — CYCLOBENZAPRINE HCL 5 MG
TABLET ORAL
COMMUNITY
Start: 2021-04-28 | End: 2021-05-04

## 2021-05-04 RX ORDER — IBUPROFEN 800 MG/1
TABLET ORAL
COMMUNITY
Start: 2021-04-28 | End: 2021-05-04

## 2021-05-04 RX ORDER — KETOROLAC TROMETHAMINE 30 MG/ML
60 INJECTION, SOLUTION INTRAMUSCULAR; INTRAVENOUS ONCE
Status: DISCONTINUED | OUTPATIENT
Start: 2021-05-04 | End: 2021-05-04

## 2021-05-04 RX ADMIN — KETOROLAC TROMETHAMINE 60 MG: 30 INJECTION, SOLUTION INTRAMUSCULAR; INTRAVENOUS at 13:10

## 2021-05-04 NOTE — PROGRESS NOTES
Chief Complaint  Neck Pain    Subjective          Dawit Schmitz presents to [facility] for   Neck Pain   This is a new problem. The current episode started 1 to 4 weeks ago. The problem occurs constantly. The problem has been gradually improving. The pain is associated with a sleep position. The pain is present in the left side. The quality of the pain is described as aching. The pain is at a severity of 5/10. The pain is same all the time. Stiffness is present in the morning. Pertinent negatives include no chest pain, fever, headaches, leg pain, numbness, pain with swallowing, paresis, photophobia, syncope, tingling, trouble swallowing, visual change, weakness or weight loss. He has tried NSAIDs, heat, ice and muscle relaxants for the symptoms. The treatment provided mild relief.      Woke up last Sunday and had decrease ROM with rotation to the left.   Went to the gym the next day and was ok.   Lots of tightness since.     [facility] [employer] [facility] and they ave him muscle relax,  mg and alt heat/Ice but he has had no relief.   He tried naproxen instead of IBU and did not get any relief.   Yesterday he was able to sit up more and do more around the house with less pain and tightness in the neck.     Was treated last month for rib pain - reports pain is nearly gone.     Answers for HPI/ROS submitted by the patient on 5/3/2021  What is the primary reason for your visit?: Other  Please describe your symptoms.: Woke up last Sunday, 4/25/21, with a low range of motion in neck and pain going through left side of neck and into shoulder/throughout trapezius muscle. Have tried stretching, a light workout, a heavy workout, ice, heat, naproxen, 800mg ibuprofen, and cyclobenzaprine with little to no relief.  Have you had these symptoms before?: No  How long have you been having these symptoms?: 1-2 weeks  Please list any medications you are currently taking for  "this condition.: Cyclobenzaprine, 5mg, Naproxen  Please describe any probable cause for these symptoms. : Slept funny is all i can guess      Allergies   Allergen Reactions   • Prozac [Fluoxetine Hcl] Anxiety     Worsened anxiety.      Current Outpatient Medications on File Prior to Visit   Medication Sig Dispense Refill   • hydrOXYzine pamoate (VISTARIL) 50 MG capsule Take 50 mg by mouth Daily As Needed.     • levothyroxine (SYNTHROID, LEVOTHROID) 25 MCG tablet Take 1 tablet by mouth Daily. 200001     • [DISCONTINUED] cyclobenzaprine (FLEXERIL) 5 MG tablet TAKE 1 TABLET BY MOUTH TWO TIMES A DAY AS NEEDED for back pain     • [DISCONTINUED] naproxen (Naprosyn) 500 MG tablet Take 1 tablet by mouth 2 (Two) Times a Day As Needed for Moderate Pain . 60 tablet 0   • [DISCONTINUED] ibuprofen (ADVIL,MOTRIN) 800 MG tablet TAKE 1 TABLET BY MOUTH EVERY EIGHT HOURS WITH FOOD AS NEEDED FOR PAIN       No current facility-administered medications on file prior to visit.         The following portions of the patient's history were reviewed and updated as appropriate: allergies, current medications, past family history, past medical history, past social history, past surgical history and problem list and are available for review within electronic record    Objective     Vital Signs:   /70   Pulse 88   Temp 97.6 °F (36.4 °C) (Temporal)   Resp 20   Ht 186.1 cm (73.25\")   Wt (!) 173 kg (380 lb 9.6 oz)   SpO2 98%   BMI 49.87 kg/m²         Physical Exam  Vitals and nursing note reviewed.   Constitutional:       Appearance: Normal appearance. He is well-developed.   HENT:      Head: Normocephalic and atraumatic.   Eyes:      Conjunctiva/sclera: Conjunctivae normal.      Pupils: Pupils are equal, round, and reactive to light.   Cardiovascular:      Rate and Rhythm: Normal rate and regular rhythm.      Heart sounds: Normal heart sounds.   Pulmonary:      Effort: Pulmonary effort is normal. No respiratory distress.      Breath " sounds: Normal breath sounds.   Abdominal:      General: Bowel sounds are normal. There is no distension.      Palpations: Abdomen is soft.      Tenderness: There is no abdominal tenderness.   Musculoskeletal:      Cervical back: No edema, erythema, signs of trauma, rigidity, torticollis or crepitus. Muscular tenderness present. No pain with movement or spinous process tenderness. Decreased range of motion (secondary to pain ).   Lymphadenopathy:      Cervical: No cervical adenopathy.   Skin:     General: Skin is warm and dry.   Neurological:      Mental Status: He is alert and oriented to person, place, and time.   Psychiatric:         Speech: Speech normal.         Behavior: Behavior normal.         Thought Content: Thought content normal.         Judgment: Judgment normal.          Result Review :                         Assessment and Plan      Diagnoses and all orders for this visit:    1. Neck pain, acute (Primary)  -     Discontinue: ketorolac (TORADOL) injection 60 mg  -     cyclobenzaprine (FLEXERIL) 10 MG tablet; Take 1 tablet by mouth 3 (Three) Times a Day As Needed for Muscle Spasms.  Dispense: 30 tablet; Refill: 0  -     naproxen (Naprosyn) 500 MG tablet; Take 1 tablet by mouth 2 (Two) Times a Day As Needed for Moderate Pain .  Dispense: 60 tablet; Refill: 1  -     ketorolac (TORADOL) injection 60 mg    2. Rib pain  -     naproxen (Naprosyn) 500 MG tablet; Take 1 tablet by mouth 2 (Two) Times a Day As Needed for Moderate Pain .  Dispense: 60 tablet; Refill: 1      toradol injectio amrik office. No other NSAIDS for 24 hours.   Enc gentle neck stretches.    Alt heat ice PRN.        Follow Up     Patient was given instructions and counseling regarding his condition or for health maintenance advice. Please see specific information pulled into the AVS if appropriate.   Any new medication's adverse effects have been discussed in detail with patient  Patient was encouraged to keep me informed of any acute  changes, lack of improvement, or any new concerning symptoms.    Return for please change his scheduled FU on 5/18/2021 to an annual exam .    * Please note that portions of this note were completed with a voice recognition program. Efforts were made to edit the dictation but occasionally words are erroneously transcribed.

## 2021-05-18 ENCOUNTER — OFFICE VISIT (OUTPATIENT)
Dept: INTERNAL MEDICINE | Facility: CLINIC | Age: 31
End: 2021-05-18

## 2021-05-18 ENCOUNTER — LAB (OUTPATIENT)
Dept: LAB | Facility: HOSPITAL | Age: 31
End: 2021-05-18

## 2021-05-18 VITALS
HEIGHT: 74 IN | DIASTOLIC BLOOD PRESSURE: 88 MMHG | TEMPERATURE: 97.3 F | SYSTOLIC BLOOD PRESSURE: 122 MMHG | RESPIRATION RATE: 18 BRPM | HEART RATE: 87 BPM | OXYGEN SATURATION: 98 % | WEIGHT: 315 LBS | BODY MASS INDEX: 40.43 KG/M2

## 2021-05-18 DIAGNOSIS — I10 ESSENTIAL HYPERTENSION: ICD-10-CM

## 2021-05-18 DIAGNOSIS — F41.9 ANXIETY: ICD-10-CM

## 2021-05-18 DIAGNOSIS — Z02.5 SPORTS PHYSICAL: ICD-10-CM

## 2021-05-18 DIAGNOSIS — E66.01 MORBID (SEVERE) OBESITY DUE TO EXCESS CALORIES (HCC): ICD-10-CM

## 2021-05-18 DIAGNOSIS — Z00.00 ANNUAL PHYSICAL EXAM: Primary | ICD-10-CM

## 2021-05-18 DIAGNOSIS — Z00.00 ANNUAL PHYSICAL EXAM: ICD-10-CM

## 2021-05-18 DIAGNOSIS — Z11.59 NEED FOR HEPATITIS C SCREENING TEST: ICD-10-CM

## 2021-05-18 DIAGNOSIS — E03.9 ACQUIRED HYPOTHYROIDISM: ICD-10-CM

## 2021-05-18 LAB
ALBUMIN SERPL-MCNC: 4.3 G/DL (ref 3.5–5.2)
ALBUMIN/GLOB SERPL: 1.7 G/DL
ALP SERPL-CCNC: 88 U/L (ref 39–117)
ALT SERPL W P-5'-P-CCNC: 21 U/L (ref 1–41)
ANION GAP SERPL CALCULATED.3IONS-SCNC: 10.1 MMOL/L (ref 5–15)
AST SERPL-CCNC: 17 U/L (ref 1–40)
BILIRUB SERPL-MCNC: 0.3 MG/DL (ref 0–1.2)
BUN SERPL-MCNC: 16 MG/DL (ref 6–20)
BUN/CREAT SERPL: 12.7 (ref 7–25)
CALCIUM SPEC-SCNC: 9 MG/DL (ref 8.6–10.5)
CHLORIDE SERPL-SCNC: 109 MMOL/L (ref 98–107)
CHOLEST SERPL-MCNC: 140 MG/DL (ref 0–200)
CO2 SERPL-SCNC: 23.9 MMOL/L (ref 22–29)
CREAT SERPL-MCNC: 1.26 MG/DL (ref 0.76–1.27)
DEPRECATED RDW RBC AUTO: 43.3 FL (ref 37–54)
ERYTHROCYTE [DISTWIDTH] IN BLOOD BY AUTOMATED COUNT: 13 % (ref 12.3–15.4)
GFR SERPL CREATININE-BSD FRML MDRD: 67 ML/MIN/1.73
GLOBULIN UR ELPH-MCNC: 2.6 GM/DL
GLUCOSE SERPL-MCNC: 90 MG/DL (ref 65–99)
HCT VFR BLD AUTO: 42.5 % (ref 37.5–51)
HDLC SERPL-MCNC: 37 MG/DL (ref 40–60)
HGB BLD-MCNC: 14.3 G/DL (ref 13–17.7)
LDLC SERPL CALC-MCNC: 91 MG/DL (ref 0–100)
LDLC/HDLC SERPL: 2.47 {RATIO}
MCH RBC QN AUTO: 30.7 PG (ref 26.6–33)
MCHC RBC AUTO-ENTMCNC: 33.6 G/DL (ref 31.5–35.7)
MCV RBC AUTO: 91.2 FL (ref 79–97)
PLATELET # BLD AUTO: 270 10*3/MM3 (ref 140–450)
PMV BLD AUTO: 11.6 FL (ref 6–12)
POTASSIUM SERPL-SCNC: 4.5 MMOL/L (ref 3.5–5.2)
PROT SERPL-MCNC: 6.9 G/DL (ref 6–8.5)
RBC # BLD AUTO: 4.66 10*6/MM3 (ref 4.14–5.8)
SODIUM SERPL-SCNC: 143 MMOL/L (ref 136–145)
TRIGL SERPL-MCNC: 58 MG/DL (ref 0–150)
TSH SERPL DL<=0.05 MIU/L-ACNC: 4.39 UIU/ML (ref 0.27–4.2)
VLDLC SERPL-MCNC: 12 MG/DL (ref 5–40)
WBC # BLD AUTO: 5.75 10*3/MM3 (ref 3.4–10.8)

## 2021-05-18 PROCEDURE — 84439 ASSAY OF FREE THYROXINE: CPT

## 2021-05-18 PROCEDURE — 80050 GENERAL HEALTH PANEL: CPT | Performed by: NURSE PRACTITIONER

## 2021-05-18 PROCEDURE — 99999 PR OFFICE/OUTPT VISIT,PROCEDURE ONLY: CPT | Performed by: NURSE PRACTITIONER

## 2021-05-18 PROCEDURE — 86803 HEPATITIS C AB TEST: CPT | Performed by: NURSE PRACTITIONER

## 2021-05-18 PROCEDURE — 80061 LIPID PANEL: CPT | Performed by: NURSE PRACTITIONER

## 2021-05-18 PROCEDURE — 99395 PREV VISIT EST AGE 18-39: CPT | Performed by: NURSE PRACTITIONER

## 2021-05-18 RX ORDER — LISINOPRIL 10 MG/1
10 TABLET ORAL DAILY
Qty: 30 TABLET | Refills: 3 | Status: SHIPPED | OUTPATIENT
Start: 2021-05-18 | End: 2021-09-08 | Stop reason: SDUPTHER

## 2021-05-19 LAB — T4 FREE SERPL-MCNC: 1.15 NG/DL (ref 0.93–1.7)

## 2021-05-20 LAB
HCV AB S/CO SERPL IA: <0.1 S/CO RATIO (ref 0–0.9)
HCV AB SERPL QL IA: NORMAL

## 2021-05-23 DIAGNOSIS — E03.9 ACQUIRED HYPOTHYROIDISM: ICD-10-CM

## 2021-05-23 RX ORDER — LEVOTHYROXINE SODIUM 0.05 MG/1
50 TABLET ORAL DAILY
Qty: 30 TABLET | Refills: 3 | Status: SHIPPED | OUTPATIENT
Start: 2021-05-23 | End: 2021-09-08 | Stop reason: SDUPTHER

## 2021-06-15 ENCOUNTER — OFFICE VISIT (OUTPATIENT)
Dept: INTERNAL MEDICINE | Facility: CLINIC | Age: 31
End: 2021-06-15

## 2021-06-15 ENCOUNTER — LAB (OUTPATIENT)
Dept: LAB | Facility: HOSPITAL | Age: 31
End: 2021-06-15

## 2021-06-15 VITALS
HEIGHT: 74 IN | OXYGEN SATURATION: 98 % | DIASTOLIC BLOOD PRESSURE: 88 MMHG | SYSTOLIC BLOOD PRESSURE: 124 MMHG | HEART RATE: 78 BPM | BODY MASS INDEX: 40.43 KG/M2 | RESPIRATION RATE: 18 BRPM | TEMPERATURE: 97.7 F | WEIGHT: 315 LBS

## 2021-06-15 DIAGNOSIS — I10 ESSENTIAL HYPERTENSION: Primary | Chronic | ICD-10-CM

## 2021-06-15 DIAGNOSIS — I10 ESSENTIAL HYPERTENSION: ICD-10-CM

## 2021-06-15 DIAGNOSIS — F41.9 ANXIETY: ICD-10-CM

## 2021-06-15 DIAGNOSIS — E03.9 ACQUIRED HYPOTHYROIDISM: Chronic | ICD-10-CM

## 2021-06-15 DIAGNOSIS — E66.01 MORBID (SEVERE) OBESITY DUE TO EXCESS CALORIES (HCC): ICD-10-CM

## 2021-06-15 LAB
ANION GAP SERPL CALCULATED.3IONS-SCNC: 9 MMOL/L (ref 5–15)
BUN SERPL-MCNC: 19 MG/DL (ref 6–20)
BUN/CREAT SERPL: 18.1 (ref 7–25)
CALCIUM SPEC-SCNC: 9.3 MG/DL (ref 8.6–10.5)
CHLORIDE SERPL-SCNC: 106 MMOL/L (ref 98–107)
CO2 SERPL-SCNC: 25 MMOL/L (ref 22–29)
CREAT SERPL-MCNC: 1.05 MG/DL (ref 0.76–1.27)
GFR SERPL CREATININE-BSD FRML MDRD: 82 ML/MIN/1.73
GLUCOSE SERPL-MCNC: 98 MG/DL (ref 65–99)
POTASSIUM SERPL-SCNC: 4.7 MMOL/L (ref 3.5–5.2)
SODIUM SERPL-SCNC: 140 MMOL/L (ref 136–145)

## 2021-06-15 PROCEDURE — 80048 BASIC METABOLIC PNL TOTAL CA: CPT | Performed by: NURSE PRACTITIONER

## 2021-06-15 PROCEDURE — 99214 OFFICE O/P EST MOD 30 MIN: CPT | Performed by: NURSE PRACTITIONER

## 2021-06-15 NOTE — PROGRESS NOTES
Dawit Schmitz presents to [facility] for     Chief Complaint  Hypertension and Hypothyroidism    Subjective        History of Present Illness      HTN-chronic. BP stable at 124/88 in office. has been more consistent with taking his BP meds the last month Denies headaches, dizziness, visual disturbances, palpitations chest pain, dyspnea, TIA or CVA symptoms, leg pain/claudication symptoms, and edema.    Hypothyroid-chronic.  Currently on levothyroxine.  Doing well with medication compliant.  He denies any hypo or hyperthyroidism symptoms.    Anxiety- better since starting the BP meds. He has not reached out to [facility] yet.     Obesity-weight trending down since last visit he is down 3 pounds.  He is staying physically active.      Allergies   Allergen Reactions   • Prozac [Fluoxetine Hcl] Anxiety     Worsened anxiety.      Current Outpatient Medications on File Prior to Visit   Medication Sig Dispense Refill   • cyclobenzaprine (FLEXERIL) 10 MG tablet Take 1 tablet by mouth 3 (Three) Times a Day As Needed for Muscle Spasms. 30 tablet 0   • hydrOXYzine pamoate (VISTARIL) 50 MG capsule Take 50 mg by mouth Daily As Needed.     • levothyroxine (SYNTHROID, LEVOTHROID) 50 MCG tablet Take 1 tablet by mouth Daily. 30 tablet 3   • lisinopril (PRINIVIL,ZESTRIL) 10 MG tablet Take 1 tablet by mouth Daily. 30 tablet 3   • naproxen (Naprosyn) 500 MG tablet Take 1 tablet by mouth 2 (Two) Times a Day As Needed for Moderate Pain . 60 tablet 1     No current facility-administered medications on file prior to visit.         The following portions of the patient's history were reviewed and updated as appropriate: allergies, current medications, past family history, past medical history, past social history, past surgical history and problem list and are available for review within electronic record    Objective     Vital Signs:   /88   Pulse 78   Temp 97.7 °F (36.5 °C)   Resp 18   Ht  "186.7 cm (73.5\")   Wt (!) 172 kg (379 lb)   SpO2 98%   BMI 49.33 kg/m²     Wt Readings from Last 3 Encounters:   06/15/21 (!) 172 kg (379 lb)   05/18/21 (!) 173 kg (382 lb)   05/04/21 (!) 173 kg (380 lb 9.6 oz)         Physical Exam  Vitals and nursing note reviewed.   Constitutional:       Appearance: Normal appearance. He is well-developed.   HENT:      Head: Normocephalic and atraumatic.   Eyes:      Conjunctiva/sclera: Conjunctivae normal.      Pupils: Pupils are equal, round, and reactive to light.   Neck:      Thyroid: No thyroid mass or thyromegaly.   Cardiovascular:      Rate and Rhythm: Normal rate and regular rhythm.      Heart sounds: Normal heart sounds.   Pulmonary:      Effort: Pulmonary effort is normal. No respiratory distress.      Breath sounds: Normal breath sounds.   Abdominal:      General: Bowel sounds are normal. There is no distension.      Palpations: Abdomen is soft.      Tenderness: There is no abdominal tenderness.   Musculoskeletal:      Cervical back: Normal range of motion.   Skin:     General: Skin is warm and dry.   Neurological:      Mental Status: He is alert and oriented to person, place, and time.   Psychiatric:         Attention and Perception: He is attentive.         Mood and Affect: Mood is not anxious or depressed. Affect is not angry or inappropriate.         Speech: Speech normal.         Behavior: Behavior normal.         Thought Content: Thought content normal.         Judgment: Judgment normal.          Result Review :     The following data was reviewed by: NORBERTO Coulter on 06/15/2021:  CMP    CMP 5/18/21 6/15/21   Glucose 90 98   BUN 16 19   Creatinine 1.26 1.05   eGFR Non African Am 67 82   Sodium 143 140   Potassium 4.5 4.7   Chloride 109 (A) 106   Calcium 9.0 9.3   Albumin 4.30    Total Bilirubin 0.3    Alkaline Phosphatase 88    AST (SGOT) 17    ALT (SGPT) 21    (A) Abnormal value            CBC    CBC 5/18/21   WBC 5.75   RBC 4.66   Hemoglobin 14.3 "   Hematocrit 42.5   MCV 91.2   MCH 30.7   MCHC 33.6   RDW 13.0   Platelets 270           Lipid Panel    Lipid Panel 5/18/21   Total Cholesterol 140   Triglycerides 58   HDL Cholesterol 37 (A)   VLDL Cholesterol 12   LDL Cholesterol  91   LDL/HDL Ratio 2.47   (A) Abnormal value            TSH    TSH 4/6/21 5/18/21   TSH 5.950 (A) 4.390 (A)   (A) Abnormal value                            Assessment and Plan      Diagnoses and all orders for this visit:    1. Essential hypertension (Primary)  -     Basic Metabolic Panel; Future  Hypertension is well controlled with lisinopril.  We will plan to continue at current dose.  Goal BP less than 130/80.      2. Acquired hypothyroidism  Clinically euthyroid.  Continue levothyroxine.  3. Morbid (severe) obesity due to excess calories (CMS/[facility])  Patient's (Body mass index is 49.33 kg/m².) indicates that they are morbidly obese (BMI > 40 or > 35 with obesity - related health condition) with obesity-related health conditions that include hypertension . Obesity is improving with lifestyle modifications. BMI is is above average; BMI management plan is completed. We discussed low calorie, low carb based diet program, portion control and increasing exercise.     4. Anxiety  Significantly better since getting better blood pressure control.  Monitor symptoms and if worsening he will follow-up for further recommendations on medication.          Follow Up     Patient was given instructions and counseling regarding his condition or for health maintenance advice. Please see specific information pulled into the AVS if appropriate.   Any new medication's adverse effects have been discussed in detail with patient  Patient was encouraged to keep me informed of any acute changes, lack of improvement, or any new concerning symptoms.    Return in about 3 months (around 9/15/2021) for chronic condition follow up, collect labs today.    * Please note that portions of this note were completed with a  voice recognition program. Efforts were made to edit the dictation but occasionally words are erroneously transcribed.

## 2021-06-27 PROBLEM — I10 ESSENTIAL HYPERTENSION: Chronic | Status: ACTIVE | Noted: 2021-05-18

## 2021-06-27 PROBLEM — E66.01 MORBID (SEVERE) OBESITY DUE TO EXCESS CALORIES (HCC): Chronic | Status: ACTIVE | Noted: 2021-05-18

## 2021-06-27 PROBLEM — R03.0 ELEVATED BP WITHOUT DIAGNOSIS OF HYPERTENSION: Status: RESOLVED | Noted: 2021-04-06 | Resolved: 2021-06-27

## 2021-06-27 PROBLEM — E03.9 ACQUIRED HYPOTHYROIDISM: Chronic | Status: ACTIVE | Noted: 2021-04-06

## 2021-07-02 ENCOUNTER — OFFICE VISIT (OUTPATIENT)
Dept: INTERNAL MEDICINE | Facility: CLINIC | Age: 31
End: 2021-07-02

## 2021-07-02 VITALS
SYSTOLIC BLOOD PRESSURE: 126 MMHG | HEART RATE: 77 BPM | RESPIRATION RATE: 18 BRPM | DIASTOLIC BLOOD PRESSURE: 88 MMHG | TEMPERATURE: 97.5 F | WEIGHT: 315 LBS | BODY MASS INDEX: 40.43 KG/M2 | OXYGEN SATURATION: 98 % | HEIGHT: 74 IN

## 2021-07-02 DIAGNOSIS — J01.90 ACUTE BACTERIAL SINUSITIS: Primary | ICD-10-CM

## 2021-07-02 DIAGNOSIS — Z91.09 ENVIRONMENTAL ALLERGIES: ICD-10-CM

## 2021-07-02 DIAGNOSIS — B96.89 ACUTE BACTERIAL SINUSITIS: Primary | ICD-10-CM

## 2021-07-02 PROCEDURE — 99213 OFFICE O/P EST LOW 20 MIN: CPT | Performed by: NURSE PRACTITIONER

## 2021-07-02 RX ORDER — AMOXICILLIN AND CLAVULANATE POTASSIUM 875; 125 MG/1; MG/1
1 TABLET, FILM COATED ORAL 2 TIMES DAILY
Qty: 20 TABLET | Refills: 0 | Status: SHIPPED | OUTPATIENT
Start: 2021-07-02 | End: 2021-09-08

## 2021-07-02 NOTE — PROGRESS NOTES
"  Dawit Schmitz presents to [facility] for     Chief Complaint  Nasal Congestion (nasal congestion about 2 weeks)    Subjective        Sinus Problem  This is a new problem. The current episode started 1 to 4 weeks ago (2 weeks). The problem is unchanged. There has been no fever. The pain is mild. Associated symptoms include congestion (nasal), coughing, sinus pressure and sneezing. Pertinent negatives include no chills, diaphoresis, ear pain, headaches, hoarse voice, neck pain, shortness of breath, sore throat or swollen glands. Past treatments include oral decongestants (flonase). The treatment provided mild relief.   worse in am and PM   A bit of itchiness  all over as well.       Allergies   Allergen Reactions   • Prozac [Fluoxetine Hcl] Anxiety     Worsened anxiety.      Current Outpatient Medications on File Prior to Visit   Medication Sig Dispense Refill   • cyclobenzaprine (FLEXERIL) 10 MG tablet Take 1 tablet by mouth 3 (Three) Times a Day As Needed for Muscle Spasms. 30 tablet 0   • hydrOXYzine pamoate (VISTARIL) 50 MG capsule Take 50 mg by mouth Daily As Needed.     • levothyroxine (SYNTHROID, LEVOTHROID) 50 MCG tablet Take 1 tablet by mouth Daily. 30 tablet 3   • lisinopril (PRINIVIL,ZESTRIL) 10 MG tablet Take 1 tablet by mouth Daily. 30 tablet 3   • naproxen (Naprosyn) 500 MG tablet Take 1 tablet by mouth 2 (Two) Times a Day As Needed for Moderate Pain . 60 tablet 1     No current facility-administered medications on file prior to visit.         The following portions of the patient's history were reviewed and updated as appropriate: allergies, current medications, past family history, past medical history, past social history, past surgical history and problem list and are available for review within electronic record    Objective     Vital Signs:   /88   Pulse 77   Temp 97.5 °F (36.4 °C)   Resp 18   Ht 186.7 cm (73.5\")   Wt (!) 173 kg (382 lb)   SpO2 98%  "  BMI 49.72 kg/m²         Physical Exam  Vitals and nursing note reviewed.   Constitutional:       Appearance: Normal appearance. He is well-developed.   HENT:      Head: Normocephalic and atraumatic.      Nose: Mucosal edema, congestion and rhinorrhea present.      Right Turbinates: Enlarged and swollen.      Left Turbinates: Enlarged and swollen.      Right Sinus: Frontal sinus tenderness present.      Left Sinus: Frontal sinus tenderness present.   Eyes:      Conjunctiva/sclera: Conjunctivae normal.      Pupils: Pupils are equal, round, and reactive to light.   Cardiovascular:      Rate and Rhythm: Normal rate and regular rhythm.      Heart sounds: Normal heart sounds.   Pulmonary:      Effort: Pulmonary effort is normal. No respiratory distress.      Breath sounds: Normal breath sounds.   Abdominal:      General: Bowel sounds are normal. There is no distension.      Palpations: Abdomen is soft.      Tenderness: There is no abdominal tenderness.   Musculoskeletal:      Cervical back: Normal range of motion.   Skin:     General: Skin is warm and dry.   Neurological:      Mental Status: He is alert and oriented to person, place, and time.   Psychiatric:         Speech: Speech normal.         Behavior: Behavior normal.         Thought Content: Thought content normal.         Judgment: Judgment normal.          Result Review :                         Assessment and Plan      Diagnoses and all orders for this visit:    1. Acute bacterial sinusitis (Primary)  -     amoxicillin-clavulanate (AUGMENTIN) 875-125 MG per tablet; Take 1 tablet by mouth 2 (Two) Times a Day.  Dispense: 20 tablet; Refill: 0    2. Environmental allergies      Patient has been prescribed an antibiotic for above conditions. I have discussed side effects with them. Educated patient on antibiotic reistance and patient encouraged to complete entire course of antibiotic.   Increase rest and fluids.    Over-the-counter lozenges as needed for sore throat  or cough  Warm salt water gargles if needed for sore throat  Over-the-counter Flonase nasal spray per package instructions  Avoid smoke or fumes.    May use humidifier.    Encouraged saline nasal irrigations.    Start Zyrtec.           Follow Up     Patient was given instructions and counseling regarding his condition or for health maintenance advice. Please see specific information pulled into the AVS if appropriate.   Any new medication's adverse effects have been discussed in detail with patient  Patient was encouraged to keep me informed of any acute changes, lack of improvement, or any new concerning symptoms.    No follow-ups on file.    * Please note that portions of this note were completed with a voice recognition program. Efforts were made to edit the dictation but occasionally words are erroneously transcribed.

## 2021-09-08 ENCOUNTER — LAB (OUTPATIENT)
Dept: LAB | Facility: HOSPITAL | Age: 31
End: 2021-09-08

## 2021-09-08 ENCOUNTER — OFFICE VISIT (OUTPATIENT)
Dept: INTERNAL MEDICINE | Facility: CLINIC | Age: 31
End: 2021-09-08

## 2021-09-08 VITALS
DIASTOLIC BLOOD PRESSURE: 80 MMHG | WEIGHT: 315 LBS | SYSTOLIC BLOOD PRESSURE: 132 MMHG | RESPIRATION RATE: 16 BRPM | BODY MASS INDEX: 40.43 KG/M2 | HEIGHT: 74 IN | OXYGEN SATURATION: 97 % | HEART RATE: 72 BPM

## 2021-09-08 DIAGNOSIS — E03.9 ACQUIRED HYPOTHYROIDISM: ICD-10-CM

## 2021-09-08 DIAGNOSIS — F41.9 ANXIETY: ICD-10-CM

## 2021-09-08 DIAGNOSIS — M54.2 NECK PAIN, ACUTE: ICD-10-CM

## 2021-09-08 DIAGNOSIS — I10 ESSENTIAL HYPERTENSION: Primary | ICD-10-CM

## 2021-09-08 DIAGNOSIS — E66.01 MORBID (SEVERE) OBESITY DUE TO EXCESS CALORIES (HCC): ICD-10-CM

## 2021-09-08 PROBLEM — R07.81 RIB PAIN: Status: RESOLVED | Noted: 2021-04-06 | Resolved: 2021-09-08

## 2021-09-08 PROCEDURE — 84443 ASSAY THYROID STIM HORMONE: CPT | Performed by: NURSE PRACTITIONER

## 2021-09-08 PROCEDURE — 99214 OFFICE O/P EST MOD 30 MIN: CPT | Performed by: NURSE PRACTITIONER

## 2021-09-08 RX ORDER — CYCLOBENZAPRINE HCL 10 MG
10 TABLET ORAL 3 TIMES DAILY PRN
Qty: 60 TABLET | Refills: 0 | Status: SHIPPED | OUTPATIENT
Start: 2021-09-08 | End: 2022-06-29 | Stop reason: SDUPTHER

## 2021-09-08 RX ORDER — LEVOTHYROXINE SODIUM 0.05 MG/1
50 TABLET ORAL DAILY
Qty: 30 TABLET | Refills: 6 | Status: SHIPPED | OUTPATIENT
Start: 2021-09-08 | End: 2022-06-29 | Stop reason: SDUPTHER

## 2021-09-08 RX ORDER — HYDROXYZINE 50 MG/1
50 TABLET, FILM COATED ORAL EVERY 8 HOURS PRN
Qty: 90 TABLET | Refills: 6 | Status: SHIPPED | OUTPATIENT
Start: 2021-09-08 | End: 2022-06-29 | Stop reason: SDUPTHER

## 2021-09-08 RX ORDER — LISINOPRIL 10 MG/1
10 TABLET ORAL DAILY
Qty: 30 TABLET | Refills: 6 | Status: SHIPPED | OUTPATIENT
Start: 2021-09-08 | End: 2022-06-29 | Stop reason: SDUPTHER

## 2021-09-08 NOTE — PROGRESS NOTES
Dawit Schmitz presents to [facility] for     Chief Complaint  Chief Complaint   Patient presents with   • Hypertension     routine follow-up    • Hypothyroidism   • Anxiety         Subjective        History of Present Illness    HTN- currently on lisinopril. Compliant with dosing. Denies AE's   feels better since being on meds. Can tolerate more physical activity without feeling palpitations or dyspnea.   Denies headaches, dizziness, visual disturbances, palpitations chest pain, dyspnea, TIA or CVA symptoms, leg pain/claudication symptoms, and edema.       Hypothyroidism-  Current symptoms: none . Patient denies change in energy level, diarrhea, heat / cold intolerance, nervousness, palpitations and weight changes. Symptoms have been well-controlled.  Currently on levothyroxine. Not missing any doses.      Anxiety- well controlled. Better since his BP has gotten better.    he does use they hydroxyzine most days but feels like this works well for him. Does not really want to try other meds.   Does not get too sleepy with the 50mg.    neck pain. -Intermittent  Uses Flexeril PRN. Has not had to use recently.   Would like to have a refill just in case needs again    Obesity-weight trending down since last visit he is down another 11 pounds.  He is staying physically active and trying to eat better         Review of Systems   Constitutional: Negative for fatigue, fever and unexpected weight loss.   Eyes: Negative for blurred vision, double vision, pain and visual disturbance.   Respiratory: Negative for cough, chest tightness, shortness of breath and wheezing.    Cardiovascular: Negative for chest pain, palpitations and leg swelling.   Gastrointestinal: Negative for abdominal pain, constipation, diarrhea, nausea and vomiting.   Genitourinary: Negative for difficulty urinating, frequency and urgency.   Musculoskeletal: Positive for neck pain ( Intermittently). Negative for arthralgias,  myalgias and neck stiffness.   Skin: Negative for color change and rash.   Neurological: Negative for dizziness, weakness, light-headedness, headache and confusion.   Hematological: Negative for adenopathy. Does not bruise/bleed easily.         Allergies   Allergen Reactions   • Prozac [Fluoxetine Hcl] Anxiety     Worsened anxiety.      Current Outpatient Medications on File Prior to Visit   Medication Sig Dispense Refill   • naproxen (Naprosyn) 500 MG tablet Take 1 tablet by mouth 2 (Two) Times a Day As Needed for Moderate Pain . 60 tablet 1   • [DISCONTINUED] cyclobenzaprine (FLEXERIL) 10 MG tablet Take 1 tablet by mouth 3 (Three) Times a Day As Needed for Muscle Spasms. 30 tablet 0   • [DISCONTINUED] hydrOXYzine pamoate (VISTARIL) 50 MG capsule Take 50 mg by mouth Daily As Needed.     • [DISCONTINUED] levothyroxine (SYNTHROID, LEVOTHROID) 50 MCG tablet Take 1 tablet by mouth Daily. 30 tablet 3   • [DISCONTINUED] lisinopril (PRINIVIL,ZESTRIL) 10 MG tablet Take 1 tablet by mouth Daily. 30 tablet 3   • [DISCONTINUED] amoxicillin-clavulanate (AUGMENTIN) 875-125 MG per tablet Take 1 tablet by mouth 2 (Two) Times a Day. 20 tablet 0     No current facility-administered medications on file prior to visit.         The following portions of the patient's history were reviewed and updated as appropriate: allergies, current medications, past family history, past medical history, past social history, past surgical history and problem list and are available for review within electronic record    Objective     Result Review :     The following data was reviewed by: NORBERTO Coulter on 09/08/2021:  CMP    CMP 5/18/21 6/15/21   Glucose 90 98   BUN 16 19   Creatinine 1.26 1.05   eGFR Non African Am 67 82   Sodium 143 140   Potassium 4.5 4.7   Chloride 109 (A) 106   Calcium 9.0 9.3   Albumin 4.30    Total Bilirubin 0.3    Alkaline Phosphatase 88    AST (SGOT) 17    ALT (SGPT) 21    (A) Abnormal value            CBC    CBC  "5/18/21   WBC 5.75   RBC 4.66   Hemoglobin 14.3   Hematocrit 42.5   MCV 91.2   MCH 30.7   MCHC 33.6   RDW 13.0   Platelets 270           Lipid Panel    Lipid Panel 5/18/21   Total Cholesterol 140   Triglycerides 58   HDL Cholesterol 37 (A)   VLDL Cholesterol 12   LDL Cholesterol  91   LDL/HDL Ratio 2.47   (A) Abnormal value            TSH    TSH 4/6/21 5/18/21   TSH 5.950 (A) 4.390 (A)   (A) Abnormal value                           Vital Signs:   /80   Pulse 72   Resp 16   Ht 186.7 cm (73.5\")   Wt (!) 168 kg (371 lb 6.4 oz)   SpO2 97%   BMI 48.33 kg/m²         Physical Exam  Vitals and nursing note reviewed.   Constitutional:       General: He is not in acute distress.     Appearance: Normal appearance. He is well-developed. He is not diaphoretic.   HENT:      Head: Normocephalic and atraumatic.   Eyes:      Conjunctiva/sclera: Conjunctivae normal.      Pupils: Pupils are equal, round, and reactive to light.   Neck:      Vascular: No JVD.   Cardiovascular:      Rate and Rhythm: Normal rate and regular rhythm.      Heart sounds: Normal heart sounds. No murmur heard.     Pulmonary:      Effort: Pulmonary effort is normal. No respiratory distress.      Breath sounds: Normal breath sounds.   Chest:      Chest wall: No tenderness.   Abdominal:      General: Bowel sounds are normal. There is no distension.      Palpations: Abdomen is soft.      Tenderness: There is no abdominal tenderness.   Musculoskeletal:      Cervical back: Full passive range of motion without pain and normal range of motion. No edema, erythema or rigidity. Normal range of motion.   Skin:     General: Skin is warm and dry.      Coloration: Skin is not pale.      Findings: No erythema.   Neurological:      Mental Status: He is alert and oriented to person, place, and time.   Psychiatric:         Speech: Speech normal.         Behavior: Behavior normal.         Thought Content: Thought content normal.         Judgment: Judgment normal.      "            Assessment and Plan      Diagnoses and all orders for this visit:    1. Essential hypertension (Primary)  -     lisinopril (PRINIVIL,ZESTRIL) 10 MG tablet; Take 1 tablet by mouth Daily.  Dispense: 30 tablet; Refill: 6  Hypertension is well controlled.  Continue lisinopril at current dose.    2. Acquired hypothyroidism  -     levothyroxine (SYNTHROID, LEVOTHROID) 50 MCG tablet; Take 1 tablet by mouth Daily.  Dispense: 30 tablet; Refill: 6  -     TSH Rfx On Abnormal To Free T4; Future  He is clinically euthyroid.  We will plan to continue levothyroxine at current dose and recheck his TSH since his last TSH was slightly abnormal.  3. Neck pain, acute  -     cyclobenzaprine (FLEXERIL) 10 MG tablet; Take 1 tablet by mouth 3 (Three) Times a Day As Needed for Muscle Spasms.  Dispense: 60 tablet; Refill: 0  Okay to use as needed Flexeril sparingly.  If neck pain worsens in the future will need to evaluate further.  4. Anxiety  -     hydrOXYzine (ATARAX) 50 MG tablet; Take 1 tablet by mouth Every 8 (Eight) Hours As Needed for Anxiety.  Dispense: 90 tablet; Refill: 6  Well-controlled with hydroxyzine.  Discussed other treatment options such as SSRIs or SNRIs.  He is not interested is at this time to continue the hydroxyzine.    5. Morbid (severe) obesity due to excess calories (CMS/[facility])  Patient's (Body mass index is 48.33 kg/m².) indicates that they are morbidly obese (BMI > 40 or > 35 with obesity - related health condition) with health related conditions that include hypertension . Weight is improving with lifestyle modifications. BMI is is above average; BMI management plan is completed. We discussed low calorie, low carb based diet program, portion control and increasing exercise.         Follow Up     Patient was given instructions and counseling regarding his condition or for health maintenance advice. Please see specific information pulled into the AVS if appropriate.   Any new medication's adverse effects  have been discussed in detail with patient  Patient was encouraged to keep me informed of any acute changes, lack of improvement, or any new concerning symptoms.    Return in about 6 months (around 3/8/2022) for chronic condition follow up, collect labs today.          * Please note that portions of this note were completed with a voice recognition program. Efforts were made to edit the dictation but occasionally words are erroneously transcribed.

## 2021-09-09 LAB — TSH SERPL DL<=0.05 MIU/L-ACNC: 3.16 UIU/ML (ref 0.27–4.2)

## 2022-06-29 ENCOUNTER — OFFICE VISIT (OUTPATIENT)
Dept: INTERNAL MEDICINE | Facility: CLINIC | Age: 32
End: 2022-06-29

## 2022-06-29 VITALS
TEMPERATURE: 98.4 F | HEIGHT: 74 IN | BODY MASS INDEX: 40.43 KG/M2 | SYSTOLIC BLOOD PRESSURE: 122 MMHG | HEART RATE: 94 BPM | DIASTOLIC BLOOD PRESSURE: 86 MMHG | OXYGEN SATURATION: 97 % | WEIGHT: 315 LBS | RESPIRATION RATE: 18 BRPM

## 2022-06-29 DIAGNOSIS — Z23 NEED FOR DIPHTHERIA-TETANUS-PERTUSSIS (TDAP) VACCINE: ICD-10-CM

## 2022-06-29 DIAGNOSIS — R53.83 FATIGUE, UNSPECIFIED TYPE: ICD-10-CM

## 2022-06-29 DIAGNOSIS — E03.9 ACQUIRED HYPOTHYROIDISM: ICD-10-CM

## 2022-06-29 DIAGNOSIS — I10 ESSENTIAL HYPERTENSION: Primary | ICD-10-CM

## 2022-06-29 DIAGNOSIS — R68.82 DECREASED LIBIDO: ICD-10-CM

## 2022-06-29 DIAGNOSIS — M25.511 ACUTE PAIN OF RIGHT SHOULDER: ICD-10-CM

## 2022-06-29 DIAGNOSIS — Z23 NEED FOR COVID-19 VACCINE: ICD-10-CM

## 2022-06-29 DIAGNOSIS — F41.9 ANXIETY: ICD-10-CM

## 2022-06-29 PROCEDURE — 90715 TDAP VACCINE 7 YRS/> IM: CPT | Performed by: NURSE PRACTITIONER

## 2022-06-29 PROCEDURE — 90471 IMMUNIZATION ADMIN: CPT | Performed by: NURSE PRACTITIONER

## 2022-06-29 PROCEDURE — 0053A COVID-19 (PFIZER) 12+ YRS: CPT | Performed by: NURSE PRACTITIONER

## 2022-06-29 PROCEDURE — 99214 OFFICE O/P EST MOD 30 MIN: CPT | Performed by: NURSE PRACTITIONER

## 2022-06-29 PROCEDURE — 91305 COVID-19 (PFIZER) 12+ YRS: CPT | Performed by: NURSE PRACTITIONER

## 2022-06-29 RX ORDER — LEVOTHYROXINE SODIUM 0.05 MG/1
50 TABLET ORAL DAILY
Qty: 30 TABLET | Refills: 6 | Status: SHIPPED | OUTPATIENT
Start: 2022-06-29 | End: 2022-07-16 | Stop reason: SDUPTHER

## 2022-06-29 RX ORDER — CYCLOBENZAPRINE HCL 10 MG
10 TABLET ORAL 3 TIMES DAILY PRN
Qty: 60 TABLET | Refills: 0 | Status: SHIPPED | OUTPATIENT
Start: 2022-06-29 | End: 2022-12-20 | Stop reason: SDUPTHER

## 2022-06-29 RX ORDER — HYDROXYZINE 50 MG/1
50 TABLET, FILM COATED ORAL EVERY 8 HOURS PRN
Qty: 90 TABLET | Refills: 6 | Status: SHIPPED | OUTPATIENT
Start: 2022-06-29

## 2022-06-29 RX ORDER — LISINOPRIL 10 MG/1
10 TABLET ORAL DAILY
Qty: 30 TABLET | Refills: 6 | Status: SHIPPED | OUTPATIENT
Start: 2022-06-29 | End: 2022-12-20 | Stop reason: SDUPTHER

## 2022-06-29 RX ORDER — NAPROXEN 500 MG/1
500 TABLET ORAL 2 TIMES DAILY PRN
Qty: 60 TABLET | Refills: 1 | Status: SHIPPED | OUTPATIENT
Start: 2022-06-29 | End: 2022-10-07

## 2022-07-12 ENCOUNTER — LAB (OUTPATIENT)
Dept: LAB | Facility: HOSPITAL | Age: 32
End: 2022-07-12

## 2022-07-12 DIAGNOSIS — R53.83 FATIGUE, UNSPECIFIED TYPE: ICD-10-CM

## 2022-07-12 DIAGNOSIS — E03.9 ACQUIRED HYPOTHYROIDISM: ICD-10-CM

## 2022-07-12 DIAGNOSIS — R68.82 DECREASED LIBIDO: ICD-10-CM

## 2022-07-12 DIAGNOSIS — F41.9 ANXIETY: ICD-10-CM

## 2022-07-12 DIAGNOSIS — I10 ESSENTIAL HYPERTENSION: ICD-10-CM

## 2022-07-12 LAB
ALBUMIN SERPL-MCNC: 4 G/DL (ref 3.5–5.2)
ALBUMIN/GLOB SERPL: 1.8 G/DL
ALP SERPL-CCNC: 72 U/L (ref 39–117)
ALT SERPL W P-5'-P-CCNC: 14 U/L (ref 1–41)
ANION GAP SERPL CALCULATED.3IONS-SCNC: 10.6 MMOL/L (ref 5–15)
AST SERPL-CCNC: 16 U/L (ref 1–40)
BILIRUB SERPL-MCNC: <0.2 MG/DL (ref 0–1.2)
BUN SERPL-MCNC: 20 MG/DL (ref 6–20)
BUN/CREAT SERPL: 18.7 (ref 7–25)
CALCIUM SPEC-SCNC: 8.7 MG/DL (ref 8.6–10.5)
CHLORIDE SERPL-SCNC: 108 MMOL/L (ref 98–107)
CHOLEST SERPL-MCNC: 106 MG/DL (ref 0–200)
CO2 SERPL-SCNC: 23.4 MMOL/L (ref 22–29)
CREAT SERPL-MCNC: 1.07 MG/DL (ref 0.76–1.27)
DEPRECATED RDW RBC AUTO: 41.9 FL (ref 37–54)
EGFRCR SERPLBLD CKD-EPI 2021: 94.6 ML/MIN/1.73
ERYTHROCYTE [DISTWIDTH] IN BLOOD BY AUTOMATED COUNT: 13 % (ref 12.3–15.4)
GLOBULIN UR ELPH-MCNC: 2.2 GM/DL
GLUCOSE SERPL-MCNC: 89 MG/DL (ref 65–99)
HCT VFR BLD AUTO: 38.1 % (ref 37.5–51)
HDLC SERPL-MCNC: 40 MG/DL (ref 40–60)
HGB BLD-MCNC: 13.1 G/DL (ref 13–17.7)
LDLC SERPL CALC-MCNC: 53 MG/DL (ref 0–100)
LDLC/HDLC SERPL: 1.36 {RATIO}
MCH RBC QN AUTO: 30.8 PG (ref 26.6–33)
MCHC RBC AUTO-ENTMCNC: 34.4 G/DL (ref 31.5–35.7)
MCV RBC AUTO: 89.4 FL (ref 79–97)
PLATELET # BLD AUTO: 238 10*3/MM3 (ref 140–450)
PMV BLD AUTO: 11.8 FL (ref 6–12)
POTASSIUM SERPL-SCNC: 4.2 MMOL/L (ref 3.5–5.2)
PROT SERPL-MCNC: 6.2 G/DL (ref 6–8.5)
RBC # BLD AUTO: 4.26 10*6/MM3 (ref 4.14–5.8)
SODIUM SERPL-SCNC: 142 MMOL/L (ref 136–145)
T4 FREE SERPL-MCNC: 1.1 NG/DL (ref 0.93–1.7)
TRIGL SERPL-MCNC: 58 MG/DL (ref 0–150)
TSH SERPL DL<=0.05 MIU/L-ACNC: 7.16 UIU/ML (ref 0.27–4.2)
VLDLC SERPL-MCNC: 13 MG/DL (ref 5–40)
WBC NRBC COR # BLD: 5.78 10*3/MM3 (ref 3.4–10.8)

## 2022-07-12 PROCEDURE — 80061 LIPID PANEL: CPT | Performed by: NURSE PRACTITIONER

## 2022-07-12 PROCEDURE — 84403 ASSAY OF TOTAL TESTOSTERONE: CPT | Performed by: NURSE PRACTITIONER

## 2022-07-12 PROCEDURE — 84402 ASSAY OF FREE TESTOSTERONE: CPT | Performed by: NURSE PRACTITIONER

## 2022-07-12 PROCEDURE — 84439 ASSAY OF FREE THYROXINE: CPT | Performed by: NURSE PRACTITIONER

## 2022-07-12 PROCEDURE — 80050 GENERAL HEALTH PANEL: CPT | Performed by: NURSE PRACTITIONER

## 2022-07-15 LAB
TESTOST FREE SERPL-MCNC: 8.8 PG/ML (ref 8.7–25.1)
TESTOST SERPL-MCNC: 267 NG/DL (ref 264–916)

## 2022-07-16 DIAGNOSIS — E03.9 ACQUIRED HYPOTHYROIDISM: ICD-10-CM

## 2022-07-16 RX ORDER — LEVOTHYROXINE SODIUM 0.07 MG/1
75 TABLET ORAL DAILY
Qty: 30 TABLET | Refills: 2 | Status: SHIPPED | OUTPATIENT
Start: 2022-07-16 | End: 2022-10-19

## 2022-07-20 ENCOUNTER — TELEPHONE (OUTPATIENT)
Dept: INTERNAL MEDICINE | Facility: CLINIC | Age: 32
End: 2022-07-20

## 2022-07-20 NOTE — TELEPHONE ENCOUNTER
PT STATED THAT HE NEEDS FORMED FILLED OUT ASAP, NEXT OPENING FOR A PHYSICAL WITH [facility] WILL BE IN September, PT NEEDS FORM FILLED OUT BEFORE 8/20 FOR AN EVENT.    PLEASE ADVISE.  CALL BACK:[phone]

## 2022-07-20 NOTE — TELEPHONE ENCOUNTER
Are you okay with me taking a telehealth spot or same day for this appointment? I checked other providers schedules and they do not have open spots for a physical in the near future. Please advise.

## 2022-07-20 NOTE — TELEPHONE ENCOUNTER
Patient dropped off a physical form to be completed. After reviewing Lynda needs to see him in office for a PHYSICAL (30 min) due to some of the questions that are asked and blood work needed.     LVM for the patient to return our call, office number was provided      HUB TO READ: Please advise the patient in regards to his paperwork. . After reviewing Lynda needs to see him in office for a PHYSICAL (30 min) due to some of the questions that are asked and blood work needed.

## 2022-07-27 ENCOUNTER — OFFICE VISIT (OUTPATIENT)
Dept: INTERNAL MEDICINE | Facility: CLINIC | Age: 32
End: 2022-07-27

## 2022-07-27 VITALS
OXYGEN SATURATION: 97 % | TEMPERATURE: 98.1 F | HEIGHT: 74 IN | RESPIRATION RATE: 18 BRPM | WEIGHT: 315 LBS | BODY MASS INDEX: 40.43 KG/M2

## 2022-07-27 DIAGNOSIS — E66.01 MORBID (SEVERE) OBESITY DUE TO EXCESS CALORIES: ICD-10-CM

## 2022-07-27 DIAGNOSIS — Z11.59 NEED FOR HEPATITIS C SCREENING TEST: ICD-10-CM

## 2022-07-27 DIAGNOSIS — Z00.00 ANNUAL PHYSICAL EXAM: Primary | ICD-10-CM

## 2022-07-27 DIAGNOSIS — Z11.59 NEED FOR HEPATITIS B SCREENING TEST: ICD-10-CM

## 2022-07-27 DIAGNOSIS — Z11.4 SCREENING FOR HIV (HUMAN IMMUNODEFICIENCY VIRUS): ICD-10-CM

## 2022-07-27 PROCEDURE — 3008F BODY MASS INDEX DOCD: CPT | Performed by: NURSE PRACTITIONER

## 2022-07-27 PROCEDURE — 2014F MENTAL STATUS ASSESS: CPT | Performed by: NURSE PRACTITIONER

## 2022-07-27 PROCEDURE — 99395 PREV VISIT EST AGE 18-39: CPT | Performed by: NURSE PRACTITIONER

## 2022-08-04 ENCOUNTER — OFFICE VISIT (OUTPATIENT)
Dept: INTERNAL MEDICINE | Facility: CLINIC | Age: 32
End: 2022-08-04

## 2022-08-04 ENCOUNTER — LAB (OUTPATIENT)
Dept: LAB | Facility: HOSPITAL | Age: 32
End: 2022-08-04

## 2022-08-04 VITALS
SYSTOLIC BLOOD PRESSURE: 126 MMHG | BODY MASS INDEX: 40.43 KG/M2 | OXYGEN SATURATION: 95 % | HEIGHT: 74 IN | DIASTOLIC BLOOD PRESSURE: 84 MMHG | WEIGHT: 315 LBS | HEART RATE: 73 BPM | TEMPERATURE: 98.9 F

## 2022-08-04 DIAGNOSIS — Z11.59 NEED FOR HEPATITIS C SCREENING TEST: ICD-10-CM

## 2022-08-04 DIAGNOSIS — Z11.59 NEED FOR HEPATITIS B SCREENING TEST: ICD-10-CM

## 2022-08-04 DIAGNOSIS — Z11.4 SCREENING FOR HIV (HUMAN IMMUNODEFICIENCY VIRUS): ICD-10-CM

## 2022-08-04 DIAGNOSIS — Z02.5 SPORTS PHYSICAL: Primary | ICD-10-CM

## 2022-08-04 DIAGNOSIS — Z00.00 ANNUAL PHYSICAL EXAM: ICD-10-CM

## 2022-08-04 LAB
HAV IGM SERPL QL IA: NORMAL
HBV CORE IGM SERPL QL IA: NORMAL
HBV SURFACE AG SERPL QL IA: NORMAL
HCV AB SER DONR QL: NORMAL

## 2022-08-04 PROCEDURE — 99212 OFFICE O/P EST SF 10 MIN: CPT | Performed by: FAMILY MEDICINE

## 2022-08-04 PROCEDURE — G0432 EIA HIV-1/HIV-2 SCREEN: HCPCS | Performed by: NURSE PRACTITIONER

## 2022-08-04 PROCEDURE — 80074 ACUTE HEPATITIS PANEL: CPT | Performed by: NURSE PRACTITIONER

## 2022-08-04 NOTE — PROGRESS NOTES
"Subjective   Dawit Schmitz is a 32 y.o. male.     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   Pt denies family hx of sudden cardiac death.   The following portions of the patient's history were reviewed and updated as appropriate: allergies, current medications, past family history, past medical history, past social history, past surgical history and problem list.    Past Medical History:   Diagnosis Date   • Acquired hypothyroidism 4/6/2021   • Ankle sprain     left   • Anxiety    • Depression    • Essential hypertension 5/18/2021   • Fracture, foot     growth plate in right heel   • Gastroesophageal reflux disease 4/17/2017   • Hypothyroidism    • Plantar fasciitis 4/17/2017   • Rotator cuff syndrome     right   • Tennis elbow       History reviewed. No pertinent surgical history.   Family History   Problem Relation Age of Onset   • Diabetes Paternal Uncle    • Lung cancer Maternal Grandmother    • Hypertension Mother    • Thyroid disease Mother       Social History     Socioeconomic History   • Marital status: Single   Tobacco Use   • Smoking status: Former Smoker     Packs/day: 0.50     Years: 9.00     Pack years: 4.50     Types: Cigarettes     Start date: 1/1/2010     Quit date: 1/1/2019     Years since quitting: 3.5   • Smokeless tobacco: Former User     Types: Snuff, Chew     Quit date: 1/1/2018   Vaping Use   • Vaping Use: Never used   Substance and Sexual Activity   • Alcohol use: No   • Drug use: Yes     Frequency: 7.0 times per week     Types: Marijuana   • Sexual activity: Yes      Allergies   Allergen Reactions   • Prozac [Fluoxetine Hcl] Anxiety     Worsened " anxiety.        Review of Systems   Constitutional: Negative.  Negative for activity change, appetite change, chills, diaphoresis, fatigue, fever and unexpected weight change.   HENT: Negative.  Negative for congestion, dental problem, drooling, ear discharge, ear pain, facial swelling, hearing loss, mouth sores, nosebleeds, postnasal drip, rhinorrhea, sinus pressure, sinus pain, sneezing, sore throat, tinnitus, trouble swallowing and voice change.    Eyes: Negative.  Negative for photophobia, pain, discharge, redness, itching and visual disturbance.   Respiratory: Negative.  Negative for apnea, cough, choking, chest tightness, shortness of breath, wheezing and stridor.    Cardiovascular: Negative.  Negative for chest pain, palpitations and leg swelling.   Gastrointestinal: Negative.  Negative for abdominal distention, abdominal pain, anal bleeding, blood in stool, constipation, diarrhea, nausea, rectal pain and vomiting.   Endocrine: Negative.  Negative for cold intolerance, heat intolerance, polydipsia, polyphagia and polyuria.   Genitourinary: Negative.  Negative for decreased urine volume, difficulty urinating, dysuria, enuresis, flank pain, frequency, genital sores, hematuria, penile discharge, penile pain, penile swelling, scrotal swelling, testicular pain and urgency.   Musculoskeletal: Negative.  Negative for arthralgias, back pain, gait problem, joint swelling, myalgias, neck pain and neck stiffness.   Skin: Negative.  Negative for color change, pallor, rash and wound.   Allergic/Immunologic: Negative.  Negative for environmental allergies, food allergies and immunocompromised state.   Neurological: Negative.  Negative for dizziness, tremors, seizures, syncope, facial asymmetry, speech difficulty, weakness, light-headedness, numbness and headaches.   Hematological: Negative.  Negative for adenopathy. Does not bruise/bleed easily.   Psychiatric/Behavioral: Negative.  Negative for agitation, behavioral  problems, confusion, decreased concentration, dysphoric mood, hallucinations, self-injury, sleep disturbance and suicidal ideas. The patient is not nervous/anxious and is not hyperactive.        Objective   Physical Exam  Vitals and nursing note reviewed. Exam conducted with a chaperone present (assisted by SIERRA Robertson CMA).   Constitutional:       General: He is not in acute distress.     Appearance: He is well-developed. He is not diaphoretic.   HENT:      Head: Normocephalic and atraumatic.      Right Ear: Tympanic membrane, ear canal and external ear normal.      Left Ear: Tympanic membrane, ear canal and external ear normal.      Nose: Nose normal.      Mouth/Throat:      Lips: Pink.      Mouth: Mucous membranes are moist. Mucous membranes are not pale, not dry and not cyanotic. No injury.      Dentition: Normal dentition.      Tongue: No lesions.      Palate: No mass.      Pharynx: Uvula midline. No pharyngeal swelling, oropharyngeal exudate, posterior oropharyngeal erythema or uvula swelling.   Eyes:      General: Lids are normal. No scleral icterus.        Right eye: No foreign body, discharge or hordeolum.         Left eye: No foreign body, discharge or hordeolum.      Extraocular Movements:      Right eye: Normal extraocular motion and no nystagmus.      Left eye: Normal extraocular motion and no nystagmus.      Conjunctiva/sclera: Conjunctivae normal.      Right eye: Right conjunctiva is not injected. No chemosis, exudate or hemorrhage.     Left eye: Left conjunctiva is not injected. No chemosis, exudate or hemorrhage.     Pupils: Pupils are equal, round, and reactive to light.   Neck:      Thyroid: No thyroid mass or thyromegaly.      Vascular: No carotid bruit.      Trachea: Trachea normal. No tracheal deviation.   Cardiovascular:      Rate and Rhythm: Normal rate and regular rhythm.      Pulses:           Dorsalis pedis pulses are 2+ on the right side and 2+ on the left side.        Posterior tibial  pulses are 2+ on the right side and 2+ on the left side.      Heart sounds: Normal heart sounds. No murmur heard.    No friction rub. No gallop.   Pulmonary:      Effort: Pulmonary effort is normal. No respiratory distress.      Breath sounds: Normal breath sounds. No decreased breath sounds, wheezing, rhonchi or rales.   Chest:      Chest wall: No tenderness.   Breasts: Breasts are symmetrical.      Right: Normal. No swelling, bleeding, inverted nipple, mass, nipple discharge, skin change, axillary adenopathy or supraclavicular adenopathy.      Left: Normal. No swelling, bleeding, inverted nipple, mass, nipple discharge, skin change, tenderness, axillary adenopathy or supraclavicular adenopathy.       Abdominal:      General: Bowel sounds are normal. There is no distension.      Palpations: Abdomen is soft. There is no hepatomegaly, splenomegaly or mass.      Tenderness: There is no abdominal tenderness. There is no guarding or rebound.      Hernia: No hernia is present. There is no hernia in the left inguinal area or right inguinal area.   Genitourinary:     Pubic Area: No rash or pubic lice.       Penis: Normal and circumcised. No phimosis, paraphimosis, hypospadias, erythema, tenderness, discharge, swelling or lesions.       Testes: Normal.         Right: Mass, tenderness, swelling, testicular hydrocele or varicocele not present. Right testis is descended. Cremasteric reflex is present.          Left: Mass, tenderness, swelling, testicular hydrocele or varicocele not present. Left testis is descended. Cremasteric reflex is present.       Epididymis:      Right: Normal. Not inflamed or enlarged. No mass or tenderness.      Left: Normal. Not inflamed or enlarged. No mass or tenderness.      Elvin stage (genital): 5.   Musculoskeletal:         General: No tenderness or deformity. Normal range of motion.      Cervical back: Normal range of motion and neck supple.   Lymphadenopathy:      Head:      Right side of  head: No submandibular adenopathy.      Left side of head: No submandibular adenopathy.      Cervical: No cervical adenopathy.      Right cervical: No superficial, deep or posterior cervical adenopathy.     Left cervical: No superficial, deep or posterior cervical adenopathy.      Upper Body:      Right upper body: No supraclavicular, axillary or pectoral adenopathy.      Left upper body: No supraclavicular, axillary or pectoral adenopathy.      Lower Body: No right inguinal adenopathy.   Skin:     General: Skin is warm and dry.      Findings: No rash.      Nails: There is no clubbing.   Neurological:      Mental Status: He is alert and oriented to person, place, and time.      Cranial Nerves: Cranial nerves are intact. No cranial nerve deficit or facial asymmetry.      Sensory: No sensory deficit.      Motor: No weakness.      Coordination: Coordination normal. Finger-Nose-Finger Test normal.      Gait: Gait is intact.      Deep Tendon Reflexes: Reflexes are normal and symmetric.      Reflex Scores:       Bicep reflexes are 2+ on the right side and 2+ on the left side.       Brachioradialis reflexes are 2+ on the right side and 2+ on the left side.       Patellar reflexes are 2+ on the right side and 2+ on the left side.  Psychiatric:         Attention and Perception: Attention normal.         Mood and Affect: Mood and affect normal.         Speech: Speech normal.         Behavior: Behavior normal.         Thought Content: Thought content normal.         Cognition and Memory: Cognition and memory normal.         Judgment: Judgment normal.         Assessment & Plan   Diagnoses and all orders for this visit:    1. Sports physical (Primary)      Ok to [facility]  Will scan and they completed form             Current Outpatient Medications:   •  cyclobenzaprine (FLEXERIL) 10 MG tablet, Take 1 tablet by mouth 3 (Three) Times a Day As Needed for Muscle Spasms., Disp: 60 tablet, Rfl: 0  •  hydrOXYzine (ATARAX) 50 MG tablet,  Take 1 tablet by mouth Every 8 (Eight) Hours As Needed for Anxiety., Disp: 90 tablet, Rfl: 6  •  levothyroxine (SYNTHROID, LEVOTHROID) 75 MCG tablet, Take 1 tablet by mouth Daily., Disp: 30 tablet, Rfl: 2  •  lisinopril (PRINIVIL,ZESTRIL) 10 MG tablet, Take 1 tablet by mouth Daily., Disp: 30 tablet, Rfl: 6  •  naproxen (Naprosyn) 500 MG tablet, Take 1 tablet by mouth 2 (Two) Times a Day As Needed for Moderate Pain ., Disp: 60 tablet, Rfl: 1    Return if symptoms worsen or fail to improve, for Recheck.

## 2022-08-05 LAB — HIV1+2 AB SER QL: NORMAL

## 2022-09-08 ENCOUNTER — HOSPITAL ENCOUNTER (OUTPATIENT)
Dept: NUTRITION | Facility: HOSPITAL | Age: 32
Setting detail: RECURRING SERIES
Discharge: HOME | End: 2022-09-08

## 2022-09-08 PROCEDURE — 97802 MEDICAL NUTRITION INDIV IN: CPT

## 2022-09-08 NOTE — CONSULTS
"Adult Outpatient Nutrition  Assessment/PES    Patient Name: Dawit Schmitz  YOB: 1990  MRN: [id number]      Assessment Date: 09/08/22      This medical referred consult was provided as an in-office appointment today. Consent for treatment was given verbally. RD spent a total of 60 minutes with patient today.      Reason for visit:     Dawit is a 33 yo male who is referred today for weight loss.     Session Details:     Attendees: Dawit  Language/communication details: No concerns  Barriers to learning: None noted; prefers to learn by doing/visual  Details of home: Lives at home with his wife; travels regularly     Anthropometrics:     Ht Readings from Last 1 Encounters:   08/04/22 186.7 cm (73.5\")      Wt Readings from Last 1 Encounters:   08/04/22 (!) 168 kg (370 lb)      BMI Readings from Last 1 Encounters:   08/04/22 48.15 kg/m²      Patient weight not recorded              Recent weight change: Fairly stable at this time     Pertinent Labs:     Reviewed    Pertinent Medications/Supplements:    Reviewed    Nutrition Assessment:     Food insecurity: Denies  Food allergies: Denies   Intolerances/aversions: Denies  Difficulty chewing: Denies  Difficulty swallowing: Denies  Gastrointestinal symptoms that impact intake or food choices: Denies  Diet requirements related to medical condition, personal preference or cultural belief: Denies  Previous nutrition education/information: None    Barriers to following/maintaining a diet/healthy eating plan: Work and travel   Support plan: RD    Diet recall:     How many meals do you eat most days? 3  How many snacks do you eat most days? 0      Time Food/beverages consumed Quantity    Breakfast [employer] Protein bar 1   Am snack Nothing n/a   Lunch Hot dogs on buns 2   Pm snack Nothing N/a   Dinner [employer] beef and cheddar sandwich  [employer] lamb gyro   French fries 1  1  1 md   Hs snack Nothing N/a     Nutrition assessment comments: Dawit eats ~3 meals " "spaced about 5-6 hours apart based on 24 hr recall. He does note that on better days he eats more like 4-5 meals daily. In general, he believes he does not get enough calories in. 24 hr recall suggests about 2400 kcal/d intake with the majority (>50%) occurring at the later part of the day. He drinks mostly water (~1.5 gal daily).     Physical activity:    Activity  Frequency Duration    Power liftting 3-5 d/wk 2 hrs   Cardio 3-5 d/wk 30 minutes           Barriers to physical activity: None     Physical activity comments: Dawit is very physically active     Assessed Needs:     Estimated energy needs: 4300 kcal/d (MSJ x 1.6)    PES Statement:     Food and nutrition related knowledge deficit related to no prior education with RD on healthy eating as evidenced by referral to RD for healthy eating education.    Discussion/Education:    Our conversation focused around healthy eating patterns. Dawit already eats 3 meals/d and sometimes 4-5, however his meals are not necessarily evenly proportioned. We discussed different meal patterns (3 meals and 2 snacks, 5 meals/d) and how these different patterns can all be healthy depending on how they suit his lifestyle. We discussed what meals might look like based on the different patterns. For instance, if he chooses to follow a pattern where he eats ~5 meals daily, we would evenly divide a reasonable calorie goal by 5 and each meal would be approximately that size. We discussed the goal to eat every 3-4 hours and we discussed the components of a healthy plate (using handout \"Start Simply with My Plate\"). We discussed different options for meals to include all of the macronutrients (utilized [facility] \"Weight Loss Toolkit\" and [facility] \"The 3 Macronutrients\" handouts to supplement this activity/discussion). I do suspect based on the way that Dawit feels and his MSJ, that he is consistently under-eating. We discussed the concern for over-restricting (calories/carbs) and concern for " impacting workouts, as well as potentially leading to weight loss resistance over time. Plan for follow up conversation would be to discuss challenges with increasing po intake and starting a conversation about meal planning.     Dawit was actively engaged in conversation/discussion and asked appropriate questions throughout. He is very clearly ready to make some changes. He demonstrated understanding verbally as well as through some teach back with meal building activity.     Recommendations:    1. Recommend increase caloric intake to a minimum of 3000 kcal/d by following one of the meal patterns we discussed and using the My Plate guide to build a meal/snack  2. Consider a food journal to help guide discussion for meal planning   3. Return to RD in 4-6 weeks to discuss challenges and continue education     Intervention Goal(s):     1. Weight loss (slow and progressive weight loss of up to 1#/week with adequate po intake and exercise)      Resources Provided:     1. [facility] Weight Loss Toolkit  2. [facility] The 3 Macronutrients  3. [facility] Healthy Snack Options  4. Start Simply with My Plate        Total of 60 minutes spent with patient on nutrition counseling. Education based on Academy of Nutrition and Dietetics guidelines. Patient was provided with RD's contact information. Follow up visit is scheduled for 11/10/2022. Thank you for this referral.

## 2022-10-06 DIAGNOSIS — M25.511 ACUTE PAIN OF RIGHT SHOULDER: ICD-10-CM

## 2022-10-07 RX ORDER — NAPROXEN 500 MG/1
TABLET ORAL
Qty: 60 TABLET | Refills: 0 | Status: SHIPPED | OUTPATIENT
Start: 2022-10-07 | End: 2023-03-20 | Stop reason: SDUPTHER

## 2022-10-07 NOTE — TELEPHONE ENCOUNTER
Rx Refill Note  Requested Prescriptions     Pending Prescriptions Disp Refills   • naproxen (NAPROSYN) 500 MG tablet [Pharmacy Med Name: Naproxen Oral Tablet 500 MG] 60 tablet 0     Sig: TAKE 1 TABLET BY MOUTH TWO TIMES A DAY AS NEEDED FOR MODERATE PAIN      Last filled: 06/29/2022  Last office visit with prescribing clinician: 7/27/2022      Next office visit with prescribing clinician: Visit date not found     April ELVIA Moya MA  10/07/22, 07:41 EDT

## 2022-10-18 DIAGNOSIS — E03.9 ACQUIRED HYPOTHYROIDISM: ICD-10-CM

## 2022-10-19 RX ORDER — LEVOTHYROXINE SODIUM 0.07 MG/1
TABLET ORAL
Qty: 30 TABLET | Refills: 0 | Status: SHIPPED | OUTPATIENT
Start: 2022-10-19 | End: 2023-01-30 | Stop reason: SDUPTHER

## 2022-10-19 NOTE — TELEPHONE ENCOUNTER
Rx Refill Note  Requested Prescriptions     Pending Prescriptions Disp Refills   • levothyroxine (SYNTHROID, LEVOTHROID) 75 MCG tablet [Pharmacy Med Name: Levothyroxine Sodium Oral Tablet 75 MCG] 30 tablet 0     Sig: TAKE 1 TABLET BY MOUTH EVERY DAY      Last filled:  Last office visit with prescribing clinician: 7/27/2022      Next office visit with prescribing clinician: Visit date not found     April ELVIA Moya MA  10/19/22, 07:48 EDT

## 2022-11-10 ENCOUNTER — APPOINTMENT (OUTPATIENT)
Dept: NUTRITION | Facility: HOSPITAL | Age: 32
End: 2022-11-10

## 2022-12-20 ENCOUNTER — OFFICE VISIT (OUTPATIENT)
Dept: INTERNAL MEDICINE | Facility: CLINIC | Age: 32
End: 2022-12-20

## 2022-12-20 VITALS
HEART RATE: 107 BPM | OXYGEN SATURATION: 99 % | RESPIRATION RATE: 18 BRPM | DIASTOLIC BLOOD PRESSURE: 86 MMHG | WEIGHT: 315 LBS | TEMPERATURE: 97.1 F | BODY MASS INDEX: 40.43 KG/M2 | HEIGHT: 74 IN | SYSTOLIC BLOOD PRESSURE: 138 MMHG

## 2022-12-20 DIAGNOSIS — F41.9 ANXIETY: ICD-10-CM

## 2022-12-20 DIAGNOSIS — E03.9 ACQUIRED HYPOTHYROIDISM: Primary | ICD-10-CM

## 2022-12-20 DIAGNOSIS — R52 PAIN: ICD-10-CM

## 2022-12-20 DIAGNOSIS — I10 ESSENTIAL HYPERTENSION: ICD-10-CM

## 2022-12-20 DIAGNOSIS — E66.01 MORBID (SEVERE) OBESITY DUE TO EXCESS CALORIES: ICD-10-CM

## 2022-12-20 PROCEDURE — 99214 OFFICE O/P EST MOD 30 MIN: CPT | Performed by: NURSE PRACTITIONER

## 2022-12-20 RX ORDER — LISINOPRIL 10 MG/1
10 TABLET ORAL DAILY
Qty: 30 TABLET | Refills: 6 | Status: SHIPPED | OUTPATIENT
Start: 2022-12-20 | End: 2023-01-30 | Stop reason: SDUPTHER

## 2022-12-20 RX ORDER — CYCLOBENZAPRINE HCL 10 MG
10 TABLET ORAL 3 TIMES DAILY PRN
Qty: 60 TABLET | Refills: 0 | Status: SHIPPED | OUTPATIENT
Start: 2022-12-20 | End: 2023-03-20 | Stop reason: SDUPTHER

## 2022-12-20 NOTE — PROGRESS NOTES
Dawit Schmitz presents to [facility] for     Chief Complaint  Chief Complaint   Patient presents with   • Hypothyroidism     5M FU    • Anxiety     5M FU    • Hypertension     5M FU        PCP:  Lynda Art APRN    Subjective        History of Present Illness    Hypertension-chronic currently on lisinopril.  Compliant with dosing except ran out of lisinopril 2 days ago.  He denies any adverse effects.  Denies headaches, dizziness, visual disturbances, palpitations chest pain, dyspnea, TIA or CVA symptoms, leg pain/claudication symptoms, and edema.       Hypothyroidism chronic.  Currently on levothyroxine 75 mcg.  Dose was adjusted in July 2022 due to elevated TSH.  He reports he is compliant with dosing denies any unusual fatigue, constipation, diarrhea, palpitations.  Thyroid symptoms are stable   he reports he feels a bit better since the increase in the levothyroxine at last visit.   Reports he has hand pain If he misses a dose.  Reports it is very rare that he does miss a dose       Anxiety-chronic.  Currently using hydroxyzine 1-2 times a day.  Works well for him.    Various aches and pains.  He reports this is related to wrestling.  Denies any major injuries or traumas.  Was having significant right shoulder pain for the last 8+ months.  Range of motion is now better.  Uses naproxen as needed and reports this works well for him.    Weight trending up.   He does exercise a lot, wrestling, weight lifting.   He does not feel as though he eats very healthy. Trying to work on better choices.   He is seeing a nutritionist.   See's again next month.     Health Maintenance:   Health Maintenance   Topic Date Due   • COVID-19 Vaccine (4 - Booster for Pfizer series) 08/24/2022   • INFLUENZA VACCINE  03/31/2023 (Originally 8/1/2022)   • ANNUAL PHYSICAL  07/27/2023   • TDAP/TD VACCINES (2 - Td or Tdap) 06/29/2032   • HEPATITIS C SCREENING  Completed   • Pneumococcal Vaccine 0-64   Aged Out   Due for COVID vaccination-declined today.  Due for flu vaccination-declined today.      Review of Systems   Constitutional: Positive for unexpected weight gain. Negative for appetite change, diaphoresis, fatigue, fever and unexpected weight loss.   Eyes: Negative for blurred vision, double vision, pain and visual disturbance.   Respiratory: Negative for cough, chest tightness, shortness of breath and wheezing.    Cardiovascular: Negative for chest pain, palpitations and leg swelling.   Gastrointestinal: Negative for abdominal distention, abdominal pain, constipation, diarrhea, nausea and vomiting.   Endocrine: Negative for polydipsia, polyphagia and polyuria.   Genitourinary: Negative for difficulty urinating, frequency and urgency.   Musculoskeletal: Positive for arthralgias and myalgias.   Skin: Negative for color change and rash.   Neurological: Negative for dizziness, facial asymmetry, weakness, light-headedness, numbness, headache and confusion.   Hematological: Negative for adenopathy. Does not bruise/bleed easily.   Psychiatric/Behavioral: Negative for self-injury, sleep disturbance and suicidal ideas. The patient is nervous/anxious.          Allergies   Allergen Reactions   • Prozac [Fluoxetine Hcl] Anxiety     Worsened anxiety.      Current Outpatient Medications on File Prior to Visit   Medication Sig Dispense Refill   • hydrOXYzine (ATARAX) 50 MG tablet Take 1 tablet by mouth Every 8 (Eight) Hours As Needed for Anxiety. 90 tablet 6   • levothyroxine (SYNTHROID, LEVOTHROID) 75 MCG tablet TAKE 1 TABLET BY MOUTH EVERY DAY 30 tablet 0   • naproxen (NAPROSYN) 500 MG tablet TAKE 1 TABLET BY MOUTH TWO TIMES A DAY AS NEEDED FOR MODERATE PAIN 60 tablet 0     No current facility-administered medications on file prior to visit.         The following portions of the patient's history were reviewed and updated as appropriate: allergies, current medications, past family history, past medical history, past  "social history, past surgical history and problem list and are available for review within electronic record    Objective     Result Review :     The following data was reviewed by: NORBERTO Coulter on 12/20/2022:  CMP    CMP 7/12/22   Glucose 89   BUN 20   Creatinine 1.07   Sodium 142   Potassium 4.2   Chloride 108 (A)   Calcium 8.7   Albumin 4.00   Total Bilirubin <0.2   Alkaline Phosphatase 72   AST (SGOT) 16   ALT (SGPT) 14   (A) Abnormal value            CBC    CBC 7/12/22   WBC 5.78   RBC 4.26   Hemoglobin 13.1   Hematocrit 38.1   MCV 89.4   MCH 30.8   MCHC 34.4   RDW 13.0   Platelets 238           Lipid Panel    Lipid Panel 7/12/22   Total Cholesterol 106   Triglycerides 58   HDL Cholesterol 40   VLDL Cholesterol 13   LDL Cholesterol  53   LDL/HDL Ratio 1.36           TSH    TSH 7/12/22   TSH 7.160 (A)   (A) Abnormal value                       Vital Signs:   /86   Pulse 107   Temp 97.1 °F (36.2 °C) (Infrared)   Resp 18   Ht 186.7 cm (73.5\")   Wt (!) 180 kg (396 lb 3.2 oz)   SpO2 99%   BMI 51.56 kg/m²         Physical Exam  Vitals and nursing note reviewed.   Constitutional:       Appearance: Normal appearance. He is well-developed. He is morbidly obese.   HENT:      Head: Normocephalic and atraumatic.   Eyes:      Conjunctiva/sclera: Conjunctivae normal.   Cardiovascular:      Rate and Rhythm: Normal rate and regular rhythm.      Heart sounds: Normal heart sounds.   Pulmonary:      Effort: Pulmonary effort is normal. No respiratory distress.      Breath sounds: Normal breath sounds.   Abdominal:      General: Bowel sounds are normal. There is no distension.      Palpations: Abdomen is soft.      Tenderness: There is no abdominal tenderness.   Musculoskeletal:      Cervical back: Normal range of motion.   Skin:     General: Skin is warm and dry.   Neurological:      Mental Status: He is alert and oriented to person, place, and time.   Psychiatric:         Speech: Speech normal.         " Behavior: Behavior normal.         Thought Content: Thought content normal.         Judgment: Judgment normal.                 Assessment and Plan      Diagnoses and all orders for this visit:    1. Acquired hypothyroidism (Primary)  -     Comprehensive Metabolic Panel; Future  -     TSH Rfx On Abnormal To Free T4; Future  Symptoms well managed.  Continue levothyroxine at current dose.  Recheck TSH.  .  2. Essential hypertension  -     lisinopril (PRINIVIL,ZESTRIL) 10 MG tablet; Take 1 tablet by mouth Daily.  Dispense: 30 tablet; Refill: 6  -     Comprehensive Metabolic Panel; Future  -     TSH Rfx On Abnormal To Free T4; Future  BP looks fairly good but he has been out of his lisinopril for couple days.  We will restart at current dosing.  He will let me know if BP not meeting goal of less than 130/80 once back on medication.  3. Anxiety  Well-controlled.  Continue as needed hydroxyzine  4. Pain  -     cyclobenzaprine (FLEXERIL) 10 MG tablet; Take 1 tablet by mouth 3 (Three) Times a Day As Needed for Muscle Spasms.  Dispense: 60 tablet; Refill: 0  -     Comprehensive Metabolic Panel; Future  -     TSH Rfx On Abnormal To Free T4; Future  Continue as needed cyclobenzaprine and naproxen.  Use sparingly.  Control targeted measures such as heat/ice.    5. Morbid (severe) obesity due to excess calories (HCC)  -     Ambulatory Referral to Weight Management Program  Class 3 Severe Obesity (BMI >=40). Obesity-related health conditions include the following: hypertension. Obesity is worsening. BMI is is above average; BMI management plan is completed. We discussed portion control and increasing exercise.        Follow Up     Patient was given instructions and counseling regarding his condition or for health maintenance advice. Please see specific information pulled into the AVS if appropriate.   Any new medication's adverse effects have been discussed in detail with patient  Patient was encouraged to keep me informed of any  acute changes, lack of improvement, or any new concerning symptoms.    Return in about 3 months (around 3/20/2023) for chronic condition follow up, and need to collect labs today.          Dictated Utilizing Dragon Dictation   Please note that portions of this note were completed with a voice recognition program.   Part of this note may be an electronic transcription/translation of spoken language to printed text using the Dragon Dictation System.

## 2022-12-21 PROBLEM — R52 PAIN: Status: ACTIVE | Noted: 2022-12-21

## 2023-01-10 ENCOUNTER — TELEPHONE (OUTPATIENT)
Dept: INTERNAL MEDICINE | Facility: CLINIC | Age: 33
End: 2023-01-10
Payer: COMMERCIAL

## 2023-01-10 NOTE — TELEPHONE ENCOUNTER
Caller: Dawit Schmitz    Relationship to patient: Self    Best call back number: [phone]    Chief complaint: PATIENT NEEDS A SPECIAL WRESTLING PHYSICAL     Type of visit: EMPLOYMENT PHYSICAL     Requested date: BEFORE 02/10/2023        Additional notes:THE PATIENT STATES THAT HE NEEDS TO HAVE A SPECIALIZED WRESTLING PHYSICAL THAT CAN ONLY BE DONE BY AN MD THE PATIENT STATES THAT IT WAS DONE BY DOCTOR AIDEN LAST YEAR PLEASE CALL PATIENT TO LET HIM KNOW IF THAT CAN BE DONE

## 2023-01-12 ENCOUNTER — HOSPITAL ENCOUNTER (OUTPATIENT)
Dept: NUTRITION | Facility: HOSPITAL | Age: 33
Setting detail: RECURRING SERIES
Discharge: HOME | End: 2023-01-12
Payer: COMMERCIAL

## 2023-01-12 NOTE — CONSULTS
Adult Outpatient Nutrition    Patient Name:  Dawit Schmitz  YOB: 1990  MRN: [id number]    Assessment Date:  1/12/2023    This medical referred consult was provided as an in-office appointment today. Consent for treatment was given verbally. RD spent a total of 30 minutes with patient today.      Reason for visit:      Dawit is a 33 yo male who is referred today for weight loss.     Pt was last seen in September, RD and pt reviewed material reviewed during last session. Pt has not had any weight changes within the past few months.     His normal schedule is getting up at 8 AM, he's at work by 9 AM, by 3 PM he's off, he goes to the gym and he's home by 6/7 PM. His eating schedule is a protein bar/pop tart in the morning around 10/11 AM, lunch around 12/1 (he's been taking his lunch), dinner after he gets home from the gym and usually a snack before bed.     Pt hasn't been counting calories but RD recommended to try and hit 3,000 first and then if he's able to increase that to 3,500 eventually that can be our goal. Pt asked about low carb/high protein. RD recommended 45% carbs and 20% protein, furthermore, recommended to try and hit around 30-40 grams of protein per meal and 60 grams of carbs per meal. If he's able to eat every 4-5 hours, RD suggested incorporating a balanced snack with protein and carbs. With pt's current meal/snack schedule, he's still not getting enough nutrients/calories for how activity he is, he's age and gender, etc.       Physical activity:     Activity  Frequency Duration    Power liftting 3-5 d/wk 2 hrs   Cardio 3-5 d/wk 30 minutes               Estimated energy needs: 4300 kcal/d (MSJ x 1.6)      Resources Provided:      1. Some quick breakfast ideas  2. Quick meal ideas  3. Eating to feel your best  4. Macronutrient foundations           Total of 30 minutes spent with patient on nutrition counseling. Education based on Academy of Nutrition and Dietetics guidelines.  Patient was provided with RD's contact information. Follow up visit is scheduled for 3/8/2022. Thank you for this referral.               Electronically signed by:  Della Godinez RD  01/12/23 14:37 EST

## 2023-01-27 ENCOUNTER — LAB (OUTPATIENT)
Dept: LAB | Facility: HOSPITAL | Age: 33
End: 2023-01-27
Payer: COMMERCIAL

## 2023-01-27 DIAGNOSIS — R52 PAIN: ICD-10-CM

## 2023-01-27 DIAGNOSIS — I10 ESSENTIAL HYPERTENSION: ICD-10-CM

## 2023-01-27 DIAGNOSIS — E03.9 ACQUIRED HYPOTHYROIDISM: ICD-10-CM

## 2023-01-27 PROCEDURE — 84443 ASSAY THYROID STIM HORMONE: CPT

## 2023-01-27 PROCEDURE — 80053 COMPREHEN METABOLIC PANEL: CPT

## 2023-01-28 LAB
ALBUMIN SERPL-MCNC: 4.3 G/DL (ref 3.5–5.2)
ALBUMIN/GLOB SERPL: 1.6 G/DL
ALP SERPL-CCNC: 77 U/L (ref 39–117)
ALT SERPL W P-5'-P-CCNC: 25 U/L (ref 1–41)
ANION GAP SERPL CALCULATED.3IONS-SCNC: 5.4 MMOL/L (ref 5–15)
AST SERPL-CCNC: 24 U/L (ref 1–40)
BILIRUB SERPL-MCNC: 0.4 MG/DL (ref 0–1.2)
BUN SERPL-MCNC: 19 MG/DL (ref 6–20)
BUN/CREAT SERPL: 15.1 (ref 7–25)
CALCIUM SPEC-SCNC: 9.7 MG/DL (ref 8.6–10.5)
CHLORIDE SERPL-SCNC: 106 MMOL/L (ref 98–107)
CO2 SERPL-SCNC: 29.6 MMOL/L (ref 22–29)
CREAT SERPL-MCNC: 1.26 MG/DL (ref 0.76–1.27)
EGFRCR SERPLBLD CKD-EPI 2021: 77.7 ML/MIN/1.73
GLOBULIN UR ELPH-MCNC: 2.7 GM/DL
GLUCOSE SERPL-MCNC: 90 MG/DL (ref 65–99)
POTASSIUM SERPL-SCNC: 4.6 MMOL/L (ref 3.5–5.2)
PROT SERPL-MCNC: 7 G/DL (ref 6–8.5)
SODIUM SERPL-SCNC: 141 MMOL/L (ref 136–145)
TSH SERPL DL<=0.05 MIU/L-ACNC: 1.48 UIU/ML (ref 0.27–4.2)

## 2023-01-30 ENCOUNTER — OFFICE VISIT (OUTPATIENT)
Dept: INTERNAL MEDICINE | Facility: CLINIC | Age: 33
End: 2023-01-30
Payer: COMMERCIAL

## 2023-01-30 VITALS
RESPIRATION RATE: 20 BRPM | HEART RATE: 78 BPM | OXYGEN SATURATION: 96 % | SYSTOLIC BLOOD PRESSURE: 130 MMHG | TEMPERATURE: 96.8 F | WEIGHT: 315 LBS | BODY MASS INDEX: 47.56 KG/M2 | DIASTOLIC BLOOD PRESSURE: 76 MMHG

## 2023-01-30 DIAGNOSIS — E66.01 MORBID (SEVERE) OBESITY DUE TO EXCESS CALORIES: ICD-10-CM

## 2023-01-30 DIAGNOSIS — G89.29 CHRONIC RIGHT SHOULDER PAIN: ICD-10-CM

## 2023-01-30 DIAGNOSIS — I10 ESSENTIAL HYPERTENSION: Primary | ICD-10-CM

## 2023-01-30 DIAGNOSIS — E03.9 ACQUIRED HYPOTHYROIDISM: ICD-10-CM

## 2023-01-30 DIAGNOSIS — M25.511 CHRONIC RIGHT SHOULDER PAIN: ICD-10-CM

## 2023-01-30 PROCEDURE — 99214 OFFICE O/P EST MOD 30 MIN: CPT | Performed by: NURSE PRACTITIONER

## 2023-01-30 RX ORDER — LISINOPRIL 10 MG/1
10 TABLET ORAL DAILY
Qty: 90 TABLET | Refills: 1 | Status: SHIPPED | OUTPATIENT
Start: 2023-01-30 | End: 2023-03-20 | Stop reason: SDUPTHER

## 2023-01-30 RX ORDER — LEVOTHYROXINE SODIUM 0.07 MG/1
75 TABLET ORAL DAILY
Qty: 90 TABLET | Refills: 3 | Status: SHIPPED | OUTPATIENT
Start: 2023-01-30

## 2023-01-30 NOTE — PROGRESS NOTES
Dawit Schmitz presents to [facility] for     Chief Complaint  Chief Complaint   Patient presents with   • Hypertension       PCP:  Lynda Art APRN    Subjective        History of Present Illness      HTN-chronic.  Currently on lisinopril.  Compliant with dosing and denies any adverse effects.  Denies headaches, dizziness, visual disturbances, palpitations chest pain, dyspnea, TIA or CVA symptoms, leg pain/claudication symptoms, and edema.     Hypothyroidism:  Current symptoms: none . Patient denies change in energy level, diarrhea, heat / cold intolerance, nervousness, palpitations and weight changes. Symptoms have been well-controlled.  Last TSH January 27, 2023 was stable at 1.4      Obesity- working with nutritionist.  Referred to bariatrics in December.  Appointment has not been made yet.  He is awaiting new patient packet from them.  Weight is trending down.  Working on eating healthier.  Stays active with wrestling  Wt Readings from Last 3 Encounters:   01/30/23 (!) 166 kg (365 lb 6.4 oz)   12/20/22 (!) 180 kg (396 lb 3.2 oz)   08/04/22 (!) 168 kg (370 lb)       Right shoulder pain for a year.   Doing band exercisies and seems to be helping as long as he does them consistently.     Health Maintenance:   Health Maintenance   Topic Date Due   • COVID-19 Vaccine (4 - Booster for Pfizer series) 02/01/2023 (Originally 8/24/2022)   • INFLUENZA VACCINE  03/31/2023 (Originally 8/1/2022)   • ANNUAL PHYSICAL  07/27/2023   • TDAP/TD VACCINES (2 - Td or Tdap) 06/29/2032   • HEPATITIS C SCREENING  Completed   • Pneumococcal Vaccine 0-64  Aged Out   Declined flu vaccine and COVID booster today      Review of Systems   Constitutional: Negative for fatigue, fever and unexpected weight loss.   Eyes: Negative for blurred vision, double vision, pain and visual disturbance.   Respiratory: Negative for cough, chest tightness, shortness of breath and wheezing.    Cardiovascular: Negative  for chest pain, palpitations and leg swelling.   Gastrointestinal: Negative for abdominal pain, constipation, diarrhea, nausea and vomiting.   Genitourinary: Negative for difficulty urinating, frequency and urgency.   Musculoskeletal: Positive for arthralgias. Negative for myalgias.   Skin: Negative for color change and rash.   Neurological: Negative for dizziness, weakness, light-headedness, headache and confusion.   Hematological: Negative for adenopathy. Does not bruise/bleed easily.         Allergies   Allergen Reactions   • Prozac [Fluoxetine Hcl] Anxiety     Worsened anxiety.      Current Outpatient Medications on File Prior to Visit   Medication Sig Dispense Refill   • cyclobenzaprine (FLEXERIL) 10 MG tablet Take 1 tablet by mouth 3 (Three) Times a Day As Needed for Muscle Spasms. 60 tablet 0   • hydrOXYzine (ATARAX) 50 MG tablet Take 1 tablet by mouth Every 8 (Eight) Hours As Needed for Anxiety. 90 tablet 6   • naproxen (NAPROSYN) 500 MG tablet TAKE 1 TABLET BY MOUTH TWO TIMES A DAY AS NEEDED FOR MODERATE PAIN 60 tablet 0   • [DISCONTINUED] levothyroxine (SYNTHROID, LEVOTHROID) 75 MCG tablet TAKE 1 TABLET BY MOUTH EVERY DAY 30 tablet 0   • [DISCONTINUED] lisinopril (PRINIVIL,ZESTRIL) 10 MG tablet Take 1 tablet by mouth Daily. 30 tablet 6     No current facility-administered medications on file prior to visit.         The following portions of the patient's history were reviewed and updated as appropriate: allergies, current medications, past family history, past medical history, past social history, past surgical history and problem list and are available for review within electronic record    Objective     Result Review :     The following data was reviewed by: NORBERTO Coulter on 01/30/2023:  CMP    CMP 7/12/22 1/27/23   Glucose 89 90   BUN 20 19   Creatinine 1.07 1.26   eGFR 94.6 77.7   Sodium 142 141   Potassium 4.2 4.6   Chloride 108 (A) 106   Calcium 8.7 9.7   Total Protein 6.2 7.0   Albumin 4.00  4.3   Globulin 2.2 2.7   Total Bilirubin <0.2 0.4   Alkaline Phosphatase 72 77   AST (SGOT) 16 24   ALT (SGPT) 14 25   Albumin/Globulin Ratio 1.8 1.6   BUN/Creatinine Ratio 18.7 15.1   Anion Gap 10.6 5.4   (A) Abnormal value       Comments are available for some flowsheets but are not being displayed.           CBC    CBC 7/12/22   WBC 5.78   RBC 4.26   Hemoglobin 13.1   Hematocrit 38.1   MCV 89.4   MCH 30.8   MCHC 34.4   RDW 13.0   Platelets 238           Lipid Panel    Lipid Panel 7/12/22   Total Cholesterol 106   Triglycerides 58   HDL Cholesterol 40   VLDL Cholesterol 13   LDL Cholesterol  53   LDL/HDL Ratio 1.36           TSH    TSH 7/12/22 1/27/23   TSH 7.160 (A) 1.480   (A) Abnormal value                       Vital Signs:   /76 (BP Location: Right arm, Patient Position: Sitting, Cuff Size: Large Adult)   Pulse 78   Temp 96.8 °F (36 °C) (Infrared)   Resp 20   Wt (!) 166 kg (365 lb 6.4 oz)   SpO2 96%   BMI 47.56 kg/m²         Physical Exam  Vitals and nursing note reviewed.   Constitutional:       Appearance: Normal appearance. He is well-developed. He is morbidly obese.   HENT:      Head: Normocephalic and atraumatic.   Eyes:      Conjunctiva/sclera: Conjunctivae normal.   Cardiovascular:      Rate and Rhythm: Normal rate and regular rhythm.      Heart sounds: Normal heart sounds.   Pulmonary:      Effort: Pulmonary effort is normal. No respiratory distress.      Breath sounds: Normal breath sounds.   Abdominal:      General: Bowel sounds are normal. There is no distension.      Palpations: Abdomen is soft.      Tenderness: There is no abdominal tenderness.   Musculoskeletal:      Cervical back: Normal range of motion.   Skin:     General: Skin is warm and dry.   Neurological:      Mental Status: He is alert and oriented to person, place, and time.   Psychiatric:         Speech: Speech normal.         Behavior: Behavior normal.         Thought Content: Thought content normal.         Judgment:  Judgment normal.                 Assessment and Plan      Diagnoses and all orders for this visit:    1. Essential hypertension (Primary)  -     lisinopril (PRINIVIL,ZESTRIL) 10 MG tablet; Take 1 tablet by mouth Daily.  Dispense: 90 tablet; Refill: 1  Stable.  Continue lisinopril  2. Chronic right shoulder pain  Doing well with band therapy exercises.  Okay to continue those.  Follow-up as needed if symptoms worsen  3. Acquired hypothyroidism  -     levothyroxine (SYNTHROID, LEVOTHROID) 75 MCG tablet; Take 1 tablet by mouth Daily.  Dispense: 90 tablet; Refill: 3  Stable.  Continue levothyroxine  4. Morbid (severe) obesity due to excess calories (HCC)  Class 3 Severe Obesity (BMI >=40). Obesity-related health conditions include the following: hypertension. Obesity is improving with lifestyle modifications. BMI is is above average; BMI management plan is completed. We discussed low calorie, low carb based diet program, portion control, increasing exercise and cont to work with nutritionist. Awaiting appt with bariatrics. .        Follow Up     Patient was given instructions and counseling regarding his condition or for health maintenance advice. Please see specific information pulled into the AVS if appropriate.   Any new medication's adverse effects have been discussed in detail with patient  Patient was encouraged to keep me informed of any acute changes, lack of improvement, or any new concerning symptoms.    Return in about 3 months (around 4/30/2023).          Dictated Utilizing Dragon Dictation   Please note that portions of this note were completed with a voice recognition program.   Part of this note may be an electronic transcription/translation of spoken language to printed text using the Dragon Dictation System.

## 2023-02-01 ENCOUNTER — OFFICE VISIT (OUTPATIENT)
Dept: INTERNAL MEDICINE | Facility: CLINIC | Age: 33
End: 2023-02-01
Payer: COMMERCIAL

## 2023-02-01 VITALS
HEIGHT: 74 IN | DIASTOLIC BLOOD PRESSURE: 92 MMHG | HEART RATE: 73 BPM | TEMPERATURE: 98.6 F | BODY MASS INDEX: 40.43 KG/M2 | SYSTOLIC BLOOD PRESSURE: 130 MMHG | WEIGHT: 315 LBS | OXYGEN SATURATION: 98 %

## 2023-02-01 DIAGNOSIS — Z02.5 ENCOUNTER FOR EXAMINATION FOR PARTICIPATION IN SPORT: Primary | ICD-10-CM

## 2023-02-01 PROCEDURE — 99215 OFFICE O/P EST HI 40 MIN: CPT | Performed by: FAMILY MEDICINE

## 2023-02-01 NOTE — PROGRESS NOTES
"Subjective   Dawit Schmitz is a 32 y.o. male.     Chief Complaint   Patient presents with   • paperwork     Need paperwork filled out and lab for wrestling license.        Visit Vitals  /92   Pulse 73   Temp 98.6 °F (37 °C)   Ht 186.7 cm (73.5\")   Wt (!) 164 kg (362 lb)   SpO2 98%   BMI 47.11 kg/m²       Vitals:    23 1413 23 1503 23 1505   Orthostatic BP: 128/90 126/80 128/86   Orthostatic Pulse: 71 66    Patient Position: Sitting Lying Sitting       History of Present Illness   Pt is a  and needs paperwork and physical exam.     The following portions of the patient's history were reviewed and updated as appropriate: allergies, current medications, past family history, past medical history, past social history, past surgical history and problem list.    Past Medical History:   Diagnosis Date   • Acquired hypothyroidism 2021   • Ankle sprain     left   • Anxiety    • Depression    • Essential hypertension 2021   • Fracture, foot     growth plate in right heel   • Gastroesophageal reflux disease 2017   • Hypothyroidism    • Plantar fasciitis 2017   • Rotator cuff syndrome     right   • Tennis elbow       History reviewed. No pertinent surgical history.   Family History   Problem Relation Age of Onset   • Diabetes Paternal Uncle    • Lung cancer Maternal Grandmother    • Hypertension Mother    • Thyroid disease Mother       Social History     Socioeconomic History   • Marital status: Single   Tobacco Use   • Smoking status: Former     Packs/day: 0.50     Years: 9.00     Pack years: 4.50     Types: Cigarettes     Start date: 2010     Quit date: 2019     Years since quittin.0   • Smokeless tobacco: Former     Types: Snuff, Chew     Quit date: 2018   Vaping Use   • Vaping Use: Never used   Substance and Sexual Activity   • Alcohol use: No   • Drug use: Yes     Frequency: 7.0 times per week     Types: Marijuana   • Sexual activity: Yes    "   Allergies   Allergen Reactions   • Prozac [Fluoxetine Hcl] Anxiety     Worsened anxiety.        Review of Systems   Constitutional: Negative.  Negative for activity change, appetite change, chills, diaphoresis, fatigue, fever and unexpected weight change.   HENT: Negative.  Negative for congestion, dental problem, drooling, ear discharge, ear pain, facial swelling, hearing loss, mouth sores, nosebleeds, postnasal drip, rhinorrhea, sinus pressure, sinus pain, sneezing, sore throat, tinnitus, trouble swallowing and voice change.    Eyes: Negative.  Negative for photophobia, pain, discharge, redness, itching and visual disturbance.   Respiratory: Negative.  Negative for apnea, cough, choking, chest tightness, shortness of breath, wheezing and stridor.    Cardiovascular: Negative.  Negative for chest pain, palpitations and leg swelling.   Gastrointestinal: Negative.  Negative for abdominal distention, abdominal pain, anal bleeding, blood in stool, constipation, diarrhea, nausea, rectal pain and vomiting.   Endocrine: Negative.  Negative for cold intolerance, heat intolerance, polydipsia, polyphagia and polyuria.   Genitourinary: Negative.  Negative for decreased urine volume, difficulty urinating, dysuria, enuresis, flank pain, frequency, genital sores, hematuria, penile discharge, penile pain, penile swelling, scrotal swelling, testicular pain and urgency.   Musculoskeletal: Negative.  Negative for arthralgias, back pain, gait problem, joint swelling, myalgias, neck pain and neck stiffness.   Skin: Negative.  Negative for color change, pallor, rash and wound.   Allergic/Immunologic: Negative.  Negative for environmental allergies, food allergies and immunocompromised state.   Neurological: Negative.  Negative for dizziness, tremors, seizures, syncope, facial asymmetry, speech difficulty, weakness, light-headedness, numbness and headaches.   Hematological: Negative.  Negative for adenopathy. Does not bruise/bleed  easily.   Psychiatric/Behavioral: Negative for agitation, behavioral problems, confusion, decreased concentration, dysphoric mood, hallucinations, self-injury, sleep disturbance and suicidal ideas. The patient is nervous/anxious. The patient is not hyperactive.        Objective   Physical Exam  Vitals and nursing note reviewed. Exam conducted with a chaperone present (assisted by Cherri TAFOYA CMA).   Constitutional:       General: He is not in acute distress.     Appearance: He is well-developed. He is obese. He is not diaphoretic.      Comments: Last 2 weights  -----------------------------                02/01/23                        1413          -----------------------------   Weight: (!) 164 kg (362 lb)  -----------------------------    Body mass index is 47.11 kg/m².     HENT:      Head: Normocephalic and atraumatic.      Right Ear: Tympanic membrane, ear canal and external ear normal.      Left Ear: Tympanic membrane, ear canal and external ear normal.      Nose: Nose normal.      Mouth/Throat:      Lips: Pink.      Mouth: Mucous membranes are moist. Mucous membranes are not pale, not dry and not cyanotic. No injury.      Dentition: Normal dentition.      Tongue: No lesions.      Palate: No mass.      Pharynx: Uvula midline. No pharyngeal swelling, oropharyngeal exudate, posterior oropharyngeal erythema or uvula swelling.   Eyes:      General: Lids are normal. No scleral icterus.        Right eye: No foreign body, discharge or hordeolum.         Left eye: No foreign body, discharge or hordeolum.      Extraocular Movements:      Right eye: Normal extraocular motion and no nystagmus.      Left eye: Normal extraocular motion and no nystagmus.      Conjunctiva/sclera: Conjunctivae normal.      Right eye: Right conjunctiva is not injected. No chemosis, exudate or hemorrhage.     Left eye: Left conjunctiva is not injected. No chemosis, exudate or hemorrhage.     Pupils: Pupils are equal, round, and  reactive to light.   Neck:      Thyroid: No thyroid mass, thyromegaly or thyroid tenderness.      Vascular: No carotid bruit.      Trachea: Trachea normal. No tracheal deviation.   Cardiovascular:      Rate and Rhythm: Normal rate and regular rhythm.      Pulses:           Dorsalis pedis pulses are 2+ on the right side and 2+ on the left side.        Posterior tibial pulses are 2+ on the right side and 2+ on the left side.      Heart sounds: Normal heart sounds. No murmur heard.    No friction rub. No gallop.   Pulmonary:      Effort: Pulmonary effort is normal. No respiratory distress.      Breath sounds: Normal breath sounds. No decreased breath sounds, wheezing, rhonchi or rales.   Chest:      Chest wall: No tenderness. There is no dullness to percussion.   Breasts:     Breasts are symmetrical.      Right: Normal. No swelling, bleeding, inverted nipple, mass, nipple discharge, skin change or tenderness.      Left: Normal. No swelling, bleeding, inverted nipple, mass, nipple discharge, skin change or tenderness.   Abdominal:      General: Bowel sounds are normal. There is no distension.      Palpations: Abdomen is soft. There is no hepatomegaly, splenomegaly or mass.      Tenderness: There is no abdominal tenderness. There is no guarding or rebound.      Hernia: There is no hernia in the left inguinal area or right inguinal area.   Genitourinary:     Pubic Area: No rash or pubic lice.       Penis: Normal.       Testes: Normal.         Right: Mass, tenderness, swelling, testicular hydrocele or varicocele not present. Right testis is descended. Cremasteric reflex is present.          Left: Mass, tenderness, swelling, testicular hydrocele or varicocele not present. Left testis is descended. Cremasteric reflex is present.       Epididymis:      Right: Not inflamed or enlarged. No mass or tenderness.      Left: Not inflamed or enlarged. No mass or tenderness.      Elvin stage (genital): 5.   Musculoskeletal:          General: No tenderness or deformity. Normal range of motion.      Cervical back: Normal range of motion and neck supple.      Right lower leg: No edema.      Left lower leg: No edema.   Lymphadenopathy:      Head:      Right side of head: No submandibular adenopathy.      Left side of head: No submandibular adenopathy.      Cervical: No cervical adenopathy.      Right cervical: No superficial, deep or posterior cervical adenopathy.     Left cervical: No superficial, deep or posterior cervical adenopathy.      Upper Body:      Right upper body: No supraclavicular, axillary or pectoral adenopathy.      Left upper body: No supraclavicular, axillary or pectoral adenopathy.      Lower Body: No right inguinal adenopathy. No left inguinal adenopathy.   Skin:     General: Skin is warm and dry.      Findings: No rash.      Nails: There is no clubbing.   Neurological:      Mental Status: He is alert and oriented to person, place, and time.      GCS: GCS eye subscore is 4. GCS verbal subscore is 5. GCS motor subscore is 6.      Cranial Nerves: Cranial nerves 2-12 are intact. No cranial nerve deficit or facial asymmetry.      Sensory: No sensory deficit.      Coordination: Coordination normal.      Gait: Gait is intact. Gait and tandem walk normal.      Deep Tendon Reflexes: Reflexes are normal and symmetric.      Reflex Scores:       Bicep reflexes are 2+ on the right side and 2+ on the left side.       Brachioradialis reflexes are 2+ on the right side and 2+ on the left side.       Patellar reflexes are 2+ on the right side and 2+ on the left side.  Psychiatric:         Attention and Perception: Attention normal.         Mood and Affect: Mood normal.         Speech: Speech normal.         Behavior: Behavior normal.         Thought Content: Thought content normal.         Cognition and Memory: Cognition normal. Cognition is not impaired. Memory is not impaired. He does not exhibit impaired recent memory or impaired remote  memory.         Judgment: Judgment normal.         Assessment & Plan   Diagnoses and all orders for this visit:    1. Encounter for examination for participation in sport (Primary)        Forms for wrestling filled out.            Current Outpatient Medications:   •  cyclobenzaprine (FLEXERIL) 10 MG tablet, Take 1 tablet by mouth 3 (Three) Times a Day As Needed for Muscle Spasms., Disp: 60 tablet, Rfl: 0  •  hydrOXYzine (ATARAX) 50 MG tablet, Take 1 tablet by mouth Every 8 (Eight) Hours As Needed for Anxiety., Disp: 90 tablet, Rfl: 6  •  levothyroxine (SYNTHROID, LEVOTHROID) 75 MCG tablet, Take 1 tablet by mouth Daily., Disp: 90 tablet, Rfl: 3  •  lisinopril (PRINIVIL,ZESTRIL) 10 MG tablet, Take 1 tablet by mouth Daily., Disp: 90 tablet, Rfl: 1  •  naproxen (NAPROSYN) 500 MG tablet, TAKE 1 TABLET BY MOUTH TWO TIMES A DAY AS NEEDED FOR MODERATE PAIN, Disp: 60 tablet, Rfl: 0    Return if symptoms worsen or fail to improve, for Recheck.     I spent 46 minutes caring for Dawit on this date of service. This time includes time spent by me in the following activities: preparing for the visit, reviewing tests, obtaining and/or reviewing a separately obtained history, performing a medically appropriate examination and/or evaluation and documenting information in the medical record

## 2023-03-08 ENCOUNTER — HOSPITAL ENCOUNTER (OUTPATIENT)
Dept: NUTRITION | Facility: HOSPITAL | Age: 33
Setting detail: RECURRING SERIES
Discharge: HOME | End: 2023-03-08
Payer: COMMERCIAL

## 2023-03-09 NOTE — PROGRESS NOTES
"Adult Outpatient Nutrition    Patient Name:  Dawit Schmitz  YOB: 1990  MRN: [id number]    Assessment Date:  3/9/2023    This medical referred consult was provided as an in-office appointment today. Consent for treatment was given verbally. RD spent a total of 30 minutes with patient today.      Reason for visit:      Dawit is a 31 yo male who is referred today for weight loss.     Pt has not had any weight changes since January. His wife has since been diagnosed with endometriosis and he has sprained his ankle. He has also being traveling a lot for work and struggles choosing \"healthier\" items when eating out, stopping for snacks. RD listed off some balanced snack ideas he can pack for his work trips. He will forget to eat a meal sometimes and then over eat at the next meal.     RD mentioned stress and encouraged pt to include some stress relief activities. Pt mentioned wanting to get his hormones checked. Pt mentioned his testosterone is low.      Physical activity:     Activity  Frequency Duration    Power liftting 3-5 d/wk 2 hrs   Cardio 3-5 d/wk 30 minutes               Estimated energy needs: 4300 kcal/d (MSJ x 1.6)            Total of 30 minutes spent with patient on nutrition counseling. Education based on Academy of Nutrition and Dietetics guidelines. Patient was provided with RD's contact information. No follow-up visit is scheduled at this time. Thank you for this referral.              Electronically signed by:  Della Godinez RD  03/09/23 08:16 EST  "

## 2023-03-20 ENCOUNTER — OFFICE VISIT (OUTPATIENT)
Dept: INTERNAL MEDICINE | Facility: CLINIC | Age: 33
End: 2023-03-20
Payer: COMMERCIAL

## 2023-03-20 VITALS
TEMPERATURE: 97.5 F | SYSTOLIC BLOOD PRESSURE: 136 MMHG | HEART RATE: 83 BPM | HEIGHT: 74 IN | RESPIRATION RATE: 24 BRPM | WEIGHT: 315 LBS | DIASTOLIC BLOOD PRESSURE: 82 MMHG | BODY MASS INDEX: 40.43 KG/M2 | OXYGEN SATURATION: 96 %

## 2023-03-20 DIAGNOSIS — M25.511 CHRONIC RIGHT SHOULDER PAIN: ICD-10-CM

## 2023-03-20 DIAGNOSIS — E03.9 ACQUIRED HYPOTHYROIDISM: ICD-10-CM

## 2023-03-20 DIAGNOSIS — I10 ESSENTIAL HYPERTENSION: Primary | ICD-10-CM

## 2023-03-20 DIAGNOSIS — E66.01 MORBID (SEVERE) OBESITY DUE TO EXCESS CALORIES: ICD-10-CM

## 2023-03-20 DIAGNOSIS — R52 PAIN: ICD-10-CM

## 2023-03-20 DIAGNOSIS — G89.29 CHRONIC RIGHT SHOULDER PAIN: ICD-10-CM

## 2023-03-20 DIAGNOSIS — M25.572 ACUTE LEFT ANKLE PAIN: ICD-10-CM

## 2023-03-20 PROCEDURE — 3079F DIAST BP 80-89 MM HG: CPT | Performed by: NURSE PRACTITIONER

## 2023-03-20 PROCEDURE — 99214 OFFICE O/P EST MOD 30 MIN: CPT | Performed by: NURSE PRACTITIONER

## 2023-03-20 PROCEDURE — 1159F MED LIST DOCD IN RCRD: CPT | Performed by: NURSE PRACTITIONER

## 2023-03-20 PROCEDURE — 1160F RVW MEDS BY RX/DR IN RCRD: CPT | Performed by: NURSE PRACTITIONER

## 2023-03-20 PROCEDURE — 3075F SYST BP GE 130 - 139MM HG: CPT | Performed by: NURSE PRACTITIONER

## 2023-03-20 RX ORDER — CYCLOBENZAPRINE HCL 10 MG
10 TABLET ORAL 3 TIMES DAILY PRN
Qty: 60 TABLET | Refills: 1 | Status: SHIPPED | OUTPATIENT
Start: 2023-03-20

## 2023-03-20 RX ORDER — NAPROXEN 500 MG/1
500 TABLET ORAL 2 TIMES DAILY PRN
Qty: 60 TABLET | Refills: 1 | Status: SHIPPED | OUTPATIENT
Start: 2023-03-20

## 2023-03-20 RX ORDER — LISINOPRIL 20 MG/1
20 TABLET ORAL DAILY
Qty: 90 TABLET | Refills: 1 | Status: SHIPPED | OUTPATIENT
Start: 2023-03-20

## 2023-03-20 NOTE — PROGRESS NOTES
Dawit Schmitz presents to [facility] for     Chief Complaint  Chief Complaint   Patient presents with   • Hypertension     Follow-up      PCP:  Lynda Art APRN    Subjective        History of Present Illness    Hypertension-chronic.  Currently on lisinopril 10 mg daily.  He is compliant with dosing denies any adverse effects.  He has had no unusual headaches, dizziness, chest pain, dyspnea, palpitations, visual disturbances, or edema.  He rarely checks blood pressure at home but when he does it runs similar to that of in office today.  He does stay physically active.  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      Hypothyroidism:  Current symptoms: none . Patient denies change in energy level, diarrhea, heat / cold intolerance, nervousness, palpitations and weight changes. Symptoms have been well-controlled.  Last TSH January 27, 2023 was stable at 1.4          Obesity- working with nutritionist.  Referred to bariatrics in December.  Appointment has not been made yet.  He is awaiting new patient packet from them.  Weight is trending down.  Working on eating healthier.  Stays active with wrestling       Right shoulder pain for a year.   Doing band exercisies and seems to be helping as long as he does them consistently.        left ankle pain    hurt during a wrestling match.   Occurred 2/26/23  Did rice therapy.   Has been getting better   pain managable with naproxen.   Was immediately able to bear weight.  Feels like he has full range of motion.  Pain has been improving gradually he has a month off from wrestling and thinks it will continue to improve since he is not stressing it with activity        Health Maintenance:   Health Maintenance   Topic Date Due   • COVID-19 Vaccine (4 - Booster for Pfizer series) 08/24/2022   • INFLUENZA VACCINE  03/31/2023 (Originally 8/1/2022)   • ANNUAL PHYSICAL  07/27/2023   • TDAP/TD VACCINES (2 - Td or Tdap) 06/29/2032   •  HEPATITIS C SCREENING  Completed   • Pneumococcal Vaccine 0-64  Aged Out     Encouraged to get COVID booster.  He declines today.  May consider at follow-up        Review of Systems   Constitutional: Negative for fatigue, fever and unexpected weight loss.   Eyes: Negative for blurred vision, double vision and visual disturbance.   Respiratory: Negative for cough, shortness of breath and wheezing.    Cardiovascular: Negative for chest pain, palpitations and leg swelling.   Gastrointestinal: Negative for abdominal pain, constipation, diarrhea, nausea and vomiting.   Genitourinary: Negative for difficulty urinating, frequency and urgency.   Musculoskeletal: Positive for arthralgias and myalgias.   Skin: Negative for color change and rash.   Neurological: Negative for dizziness, weakness and headache.   Hematological: Negative for adenopathy. Does not bruise/bleed easily.         Allergies   Allergen Reactions   • Prozac [Fluoxetine Hcl] Anxiety     Worsened anxiety.      Current Outpatient Medications on File Prior to Visit   Medication Sig Dispense Refill   • hydrOXYzine (ATARAX) 50 MG tablet Take 1 tablet by mouth Every 8 (Eight) Hours As Needed for Anxiety. 90 tablet 6   • levothyroxine (SYNTHROID, LEVOTHROID) 75 MCG tablet Take 1 tablet by mouth Daily. 90 tablet 3   • [DISCONTINUED] cyclobenzaprine (FLEXERIL) 10 MG tablet Take 1 tablet by mouth 3 (Three) Times a Day As Needed for Muscle Spasms. 60 tablet 0   • [DISCONTINUED] lisinopril (PRINIVIL,ZESTRIL) 10 MG tablet Take 1 tablet by mouth Daily. 90 tablet 1   • [DISCONTINUED] naproxen (NAPROSYN) 500 MG tablet TAKE 1 TABLET BY MOUTH TWO TIMES A DAY AS NEEDED FOR MODERATE PAIN 60 tablet 0     No current facility-administered medications on file prior to visit.         The following portions of the patient's history were reviewed and updated as appropriate: allergies, current medications, past family history, past medical history, past social history, past surgical  "history and problem list and are available for review within electronic record    Objective     Result Review :     The following data was reviewed by: NORBERTO Coulter on 03/20/2023:  Common labs    Common Labs 7/12/22 7/12/22 7/12/22 1/27/23    0920 0920 0920    Glucose  89  90   BUN  20  19   Creatinine  1.07  1.26   Sodium  142  141   Potassium  4.2  4.6   Chloride  108 (A)  106   Calcium  8.7  9.7   Albumin  4.00  4.3   Total Bilirubin  <0.2  0.4   Alkaline Phosphatase  72  77   AST (SGOT)  16  24   ALT (SGPT)  14  25   WBC 5.78      Hemoglobin 13.1      Hematocrit 38.1      Platelets 238      Total Cholesterol   106    Triglycerides   58    HDL Cholesterol   40    LDL Cholesterol    53    (A) Abnormal value       Comments are available for some flowsheets but are not being displayed.           Lab Results   Component Value Date    TSH 1.480 01/27/2023                Vital Signs:   /82 (BP Location: Right arm, Patient Position: Sitting, Cuff Size: Adult)   Pulse 83   Temp 97.5 °F (36.4 °C) (Infrared)   Resp 24   Ht 186.7 cm (73.5\")   Wt (!) 166 kg (367 lb)   SpO2 96% Comment: resting room air  BMI 47.76 kg/m²         Physical Exam  Vitals and nursing note reviewed.   Constitutional:       Appearance: Normal appearance. He is well-developed. He is morbidly obese.   HENT:      Head: Normocephalic and atraumatic.   Eyes:      Conjunctiva/sclera: Conjunctivae normal.   Cardiovascular:      Rate and Rhythm: Normal rate and regular rhythm.      Heart sounds: Normal heart sounds.   Pulmonary:      Effort: Pulmonary effort is normal. No respiratory distress.      Breath sounds: Normal breath sounds.   Abdominal:      General: Bowel sounds are normal. There is no distension.      Palpations: Abdomen is soft.      Tenderness: There is no abdominal tenderness.   Musculoskeletal:      Cervical back: Normal range of motion.      Left ankle: Swelling present. No deformity, ecchymosis or lacerations. No " tenderness. Normal range of motion. Anterior drawer test negative. Normal pulse.      Left Achilles Tendon: Normal.   Skin:     General: Skin is warm and dry.   Neurological:      Mental Status: He is alert and oriented to person, place, and time.   Psychiatric:         Speech: Speech normal.         Behavior: Behavior normal.         Thought Content: Thought content normal.         Judgment: Judgment normal.                 Assessment and Plan      Diagnoses and all orders for this visit:    1. Essential hypertension (Primary)  -     lisinopril (PRINIVIL,ZESTRIL) 20 MG tablet; Take 1 tablet by mouth Daily.  Dispense: 90 tablet; Refill: 1  Uncontrolled.  We will go ahead and increase his lisinopril to 20 mg daily.  He will follow-up in 2 to 3 weeks to recheck.  Prefer BP to be less than 130/80 consistently  2. Acquired hypothyroidism  Clinically euthyroid.  Last TSH looked great.  Continue levothyroxine at current dose  3. Chronic right shoulder pain  Still uses cyclobenzaprine and naproxen as needed.  Needs a refill to use occasionally  4. Acute left ankle pain  Continue RICE therapy.  No red flags on exam.  Can use naproxen as needed.  Stop for any GI side effects.  Follow-up if fails to improve.  5. Pain  -     naproxen (NAPROSYN) 500 MG tablet; Take 1 tablet by mouth 2 (Two) Times a Day As Needed for Moderate Pain.  Dispense: 60 tablet; Refill: 1  -     cyclobenzaprine (FLEXERIL) 10 MG tablet; Take 1 tablet by mouth 3 (Three) Times a Day As Needed for Muscle Spasms.  Dispense: 60 tablet; Refill: 1    6. Morbid (severe) obesity due to excess calories (HCC)  Class 3 Severe Obesity (BMI >=40). Obesity-related health conditions include the following: hypertension. Obesity is unchanged. BMI is is above average; BMI management plan is completed. We discussed portion control and increasing exercise.      Follow Up     Patient was given instructions and counseling regarding his condition or for health maintenance  advice. Please see specific information pulled into the AVS if appropriate.   Any new medication's adverse effects have been discussed in detail with patient  Patient was encouraged to keep me informed of any acute changes, lack of improvement, or any new concerning symptoms.    Return in about 3 weeks (around 4/10/2023) for Recheck BP .          Dictated Utilizing Dragon Dictation   Please note that portions of this note were completed with a voice recognition program.   Part of this note may be an electronic transcription/translation of spoken language to printed text using the Dragon Dictation System.

## 2023-04-10 ENCOUNTER — OFFICE VISIT (OUTPATIENT)
Dept: INTERNAL MEDICINE | Facility: CLINIC | Age: 33
End: 2023-04-10
Payer: COMMERCIAL

## 2023-04-10 VITALS
HEART RATE: 92 BPM | BODY MASS INDEX: 40.43 KG/M2 | WEIGHT: 315 LBS | SYSTOLIC BLOOD PRESSURE: 142 MMHG | HEIGHT: 74 IN | OXYGEN SATURATION: 97 % | RESPIRATION RATE: 18 BRPM | DIASTOLIC BLOOD PRESSURE: 88 MMHG | TEMPERATURE: 98 F

## 2023-04-10 DIAGNOSIS — I10 ESSENTIAL HYPERTENSION: Primary | ICD-10-CM

## 2023-04-10 DIAGNOSIS — M25.572 ACUTE LEFT ANKLE PAIN: ICD-10-CM

## 2023-04-10 PROCEDURE — 3077F SYST BP >= 140 MM HG: CPT | Performed by: NURSE PRACTITIONER

## 2023-04-10 PROCEDURE — 1160F RVW MEDS BY RX/DR IN RCRD: CPT | Performed by: NURSE PRACTITIONER

## 2023-04-10 PROCEDURE — 3079F DIAST BP 80-89 MM HG: CPT | Performed by: NURSE PRACTITIONER

## 2023-04-10 PROCEDURE — 1159F MED LIST DOCD IN RCRD: CPT | Performed by: NURSE PRACTITIONER

## 2023-04-10 PROCEDURE — 99214 OFFICE O/P EST MOD 30 MIN: CPT | Performed by: NURSE PRACTITIONER

## 2023-04-10 RX ORDER — LISINOPRIL 40 MG/1
40 TABLET ORAL DAILY
Start: 2023-04-10

## 2023-04-17 NOTE — PROGRESS NOTES
Dawit Schmitz presents to [facility] for     Chief Complaint  Chief Complaint   Patient presents with   • Hypertension     3 week recheck        PCP:  Lynda Art APRN    Subjective        History of Present Illness    Hypertension-chronic.  He is currently on lisinopril 20 once daily.  Compliant with dosing and denies any adverse effects.  We did increase his dose of the lisinopril on 3/20/2023 due to uncontrolled hypertension.  He tolerated well without any side effects but BP still uncontrolled in office today.  Denies headaches, dizziness, visual disturbances, palpitations chest pain, dyspnea, TIA or CVA symptoms, leg pain/claudication symptoms, and edema.      At his last visit he was having some ankle pain on the left.  This has been steadily improving and pain is minimal now   hurt during a wrestling match.   Occurred 2/26/23  Did rice therapy.   Has been getting better   pain managable with naproxen.   Was immediately able to bear weight.  Feels like he has full range of motion.  Pain has been improving gradually he has a month off from wrestling and thinks it will continue to improve since he is not stressing it with activity     Health Maintenance:   Health Maintenance   Topic Date Due   • COVID-19 Vaccine (4 - Booster for Pfizer series) 08/24/2022   • ANNUAL PHYSICAL  07/27/2023   • INFLUENZA VACCINE  08/01/2023   • TDAP/TD VACCINES (2 - Td or Tdap) 06/29/2032   • HEPATITIS C SCREENING  Completed   • Pneumococcal Vaccine 0-64  Aged Out   Declined COVID booster today           Review of Systems   Constitutional: Negative for fatigue, fever and unexpected weight loss.   Eyes: Negative for blurred vision, double vision and visual disturbance.   Respiratory: Negative for cough, shortness of breath and wheezing.    Cardiovascular: Negative for chest pain, palpitations and leg swelling.   Gastrointestinal: Negative for abdominal pain, constipation, diarrhea, nausea and  "vomiting.   Genitourinary: Negative for difficulty urinating, frequency and urgency.   Musculoskeletal: Positive for arthralgias. Negative for myalgias.   Skin: Negative for color change and rash.   Neurological: Negative for dizziness, weakness and headache.   Hematological: Negative for adenopathy. Does not bruise/bleed easily.         Allergies   Allergen Reactions   • Prozac [Fluoxetine Hcl] Anxiety     Worsened anxiety.      Current Outpatient Medications on File Prior to Visit   Medication Sig Dispense Refill   • cyclobenzaprine (FLEXERIL) 10 MG tablet Take 1 tablet by mouth 3 (Three) Times a Day As Needed for Muscle Spasms. 60 tablet 1   • hydrOXYzine (ATARAX) 50 MG tablet Take 1 tablet by mouth Every 8 (Eight) Hours As Needed for Anxiety. 90 tablet 6   • levothyroxine (SYNTHROID, LEVOTHROID) 75 MCG tablet Take 1 tablet by mouth Daily. 90 tablet 3   • naproxen (NAPROSYN) 500 MG tablet Take 1 tablet by mouth 2 (Two) Times a Day As Needed for Moderate Pain. 60 tablet 1     No current facility-administered medications on file prior to visit.         The following portions of the patient's history were reviewed and updated as appropriate: allergies, current medications, past family history, past medical history, past social history, past surgical history and problem list and are available for review within electronic record    Objective     Result Review :                    Vital Signs:   /88 (BP Location: Right arm, Patient Position: Sitting, Cuff Size: Adult)   Pulse 92   Temp 98 °F (36.7 °C) (Infrared)   Resp 18   Ht 186.7 cm (73.5\")   Wt (!) 168 kg (370 lb)   SpO2 97%   BMI 48.15 kg/m²         Physical Exam  Vitals and nursing note reviewed.   Constitutional:       Appearance: Normal appearance. He is well-developed. He is morbidly obese.   HENT:      Head: Normocephalic and atraumatic.   Eyes:      Conjunctiva/sclera: Conjunctivae normal.   Cardiovascular:      Rate and Rhythm: Normal rate and " regular rhythm.      Heart sounds: Normal heart sounds.   Pulmonary:      Effort: Pulmonary effort is normal. No respiratory distress.      Breath sounds: Normal breath sounds.   Abdominal:      General: Bowel sounds are normal. There is no distension.      Palpations: Abdomen is soft.      Tenderness: There is no abdominal tenderness.   Musculoskeletal:      Cervical back: Normal range of motion.      Right ankle: Normal.      Left ankle: Normal.   Skin:     General: Skin is warm and dry.   Neurological:      Mental Status: He is alert and oriented to person, place, and time.   Psychiatric:         Speech: Speech normal.         Behavior: Behavior normal.         Thought Content: Thought content normal.         Judgment: Judgment normal.                 Assessment and Plan      Diagnoses and all orders for this visit:    1. Essential hypertension (Primary)  -     lisinopril (PRINIVIL,ZESTRIL) 40 MG tablet; Take 1 tablet by mouth Daily.  Uncontrolled.  We will increase lisinopril to 40 mg daily.  Goal BP less than 130/80 consistently.  Low-sodium diet.  Minimize NSAIDs.    2. Acute left ankle pain  Significantly improved.  He has complete weightbearing.  He is still occasionally using naproxen.  Encouraged him to minimize this as this may be increasing BP a bit as well.      Follow Up     Patient was given instructions and counseling regarding his condition or for health maintenance advice. Please see specific information pulled into the AVS if appropriate.   Any new medication's adverse effects have been discussed in detail with patient  Patient was encouraged to keep me informed of any acute changes, lack of improvement, or any new concerning symptoms.    Return in about 2 months (around 6/10/2023) for Recheck.          Dictated Utilizing Dragon Dictation   Please note that portions of this note were completed with a voice recognition program.   Part of this note may be an electronic transcription/translation of  spoken language to printed text using the Dragon Dictation System.

## 2023-05-01 ENCOUNTER — OFFICE VISIT (OUTPATIENT)
Dept: INTERNAL MEDICINE | Facility: CLINIC | Age: 33
End: 2023-05-01
Payer: COMMERCIAL

## 2023-05-01 VITALS
TEMPERATURE: 97.6 F | OXYGEN SATURATION: 99 % | SYSTOLIC BLOOD PRESSURE: 142 MMHG | RESPIRATION RATE: 18 BRPM | BODY MASS INDEX: 40.43 KG/M2 | HEIGHT: 74 IN | HEART RATE: 76 BPM | DIASTOLIC BLOOD PRESSURE: 88 MMHG | WEIGHT: 315 LBS

## 2023-05-01 DIAGNOSIS — Z23 NEED FOR COVID-19 VACCINE: ICD-10-CM

## 2023-05-01 DIAGNOSIS — E66.01 MORBID (SEVERE) OBESITY DUE TO EXCESS CALORIES: ICD-10-CM

## 2023-05-01 DIAGNOSIS — I10 ESSENTIAL HYPERTENSION: Primary | ICD-10-CM

## 2023-05-01 RX ORDER — AMLODIPINE BESYLATE 5 MG/1
5 TABLET ORAL DAILY
Qty: 90 TABLET | Refills: 1 | Status: SHIPPED | OUTPATIENT
Start: 2023-05-01

## 2023-05-01 RX ORDER — LISINOPRIL 40 MG/1
40 TABLET ORAL DAILY
Qty: 90 TABLET | Refills: 1 | Status: SHIPPED | OUTPATIENT
Start: 2023-05-01

## 2023-05-01 NOTE — PROGRESS NOTES
Dawit Schmitz presents to [facility] for     Chief Complaint  Chief Complaint   Patient presents with   • Hypertension       PCP:  Lynda Art APRN    Subjective        History of Present Illness    HTN-    increased lisinopril to 40 mg and BP still up in office today.   Patient denies any headaches, dizziness, visual disturbances, palpitations, chest pain, dyspnea, TIA or CVA symptoms, leg pain/claudication symptoms, and edema.   He is not consuming excess amounts of sodium    Obesity-chronic.  He is very physically active with frequent wrestling.  Diet is fairly unhealthy.      Health maintenance:  Due for COVID booster-we will update today   due for annual exam in July 2023    Review of Systems   Constitutional: Positive for unexpected weight gain. Negative for fatigue, fever and unexpected weight loss.   Eyes: Negative for blurred vision, double vision and visual disturbance.   Respiratory: Negative for cough, shortness of breath and wheezing.    Cardiovascular: Negative for chest pain, palpitations and leg swelling.   Gastrointestinal: Negative for abdominal pain, constipation, diarrhea, nausea and vomiting.   Genitourinary: Negative for difficulty urinating, frequency and urgency.   Musculoskeletal: Positive for arthralgias. Negative for myalgias.   Skin: Negative for color change and rash.   Neurological: Negative for dizziness, weakness and headache.   Hematological: Negative for adenopathy. Does not bruise/bleed easily.         Allergies   Allergen Reactions   • Prozac [Fluoxetine Hcl] Anxiety     Worsened anxiety.      Current Outpatient Medications on File Prior to Visit   Medication Sig Dispense Refill   • cyclobenzaprine (FLEXERIL) 10 MG tablet Take 1 tablet by mouth 3 (Three) Times a Day As Needed for Muscle Spasms. 60 tablet 1   • hydrOXYzine (ATARAX) 50 MG tablet Take 1 tablet by mouth Every 8 (Eight) Hours As Needed for Anxiety. 90 tablet 6   •  "levothyroxine (SYNTHROID, LEVOTHROID) 75 MCG tablet Take 1 tablet by mouth Daily. 90 tablet 3   • naproxen (NAPROSYN) 500 MG tablet Take 1 tablet by mouth 2 (Two) Times a Day As Needed for Moderate Pain. 60 tablet 1     No current facility-administered medications on file prior to visit.         The following portions of the patient's history were reviewed and updated as appropriate: allergies, current medications, past family history, past medical history, past social history, past surgical history and problem list and are available for review within electronic record    Objective     Result Review :     The following data was reviewed by: NORBERTO Coulter on 05/01/2023:  Common labs        7/12/2022    09:20 1/27/2023    14:59   Common Labs   Glucose 89   90     BUN 20   19     Creatinine 1.07   1.26     Sodium 142   141     Potassium 4.2   4.6     Chloride 108   106     Calcium 8.7   9.7     Albumin 4.00   4.3     Total Bilirubin <0.2   0.4     Alkaline Phosphatase 72   77     AST (SGOT) 16   24     ALT (SGPT) 14   25     WBC 5.78      Hemoglobin 13.1      Hematocrit 38.1      Platelets 238      Total Cholesterol 106      Triglycerides 58      HDL Cholesterol 40      LDL Cholesterol  53                   Vital Signs:   /88 (BP Location: Right arm, Patient Position: Sitting, Cuff Size: Adult)   Pulse 76   Temp 97.6 °F (36.4 °C) (Infrared)   Resp 18   Ht 186.7 cm (73.5\")   Wt (!) 169 kg (372 lb 12.8 oz)   SpO2 99%   BMI 48.52 kg/m²         Physical Exam  Vitals and nursing note reviewed.   Constitutional:       Appearance: Normal appearance. He is well-developed. He is morbidly obese.   HENT:      Head: Normocephalic and atraumatic.   Eyes:      Conjunctiva/sclera: Conjunctivae normal.   Cardiovascular:      Rate and Rhythm: Normal rate and regular rhythm.      Heart sounds: Normal heart sounds.   Pulmonary:      Effort: Pulmonary effort is normal. No respiratory distress.      Breath sounds: " Normal breath sounds.   Abdominal:      General: Bowel sounds are normal. There is no distension.      Palpations: Abdomen is soft.      Tenderness: There is no abdominal tenderness.   Musculoskeletal:      Cervical back: Normal range of motion.   Skin:     General: Skin is warm and dry.   Neurological:      Mental Status: He is alert and oriented to person, place, and time.   Psychiatric:         Speech: Speech normal.         Behavior: Behavior normal.         Thought Content: Thought content normal.         Judgment: Judgment normal.                 Assessment and Plan      Diagnoses and all orders for this visit:    1. Essential hypertension (Primary)  -     amLODIPine (NORVASC) 5 MG tablet; Take 1 tablet by mouth Daily.  Dispense: 90 tablet; Refill: 1  -     lisinopril (PRINIVIL,ZESTRIL) 40 MG tablet; Take 1 tablet by mouth Daily.  Dispense: 90 tablet; Refill: 1  Uncontrolled.  We will continue lisinopril and add amlodipine.  Adverse effects and expectations discussed.  We will come back in about 4 weeks to reevaluate.  Goal BP less than 130/80 consistently.  2. Need for COVID-19 vaccine  -     COVID-19 (Pfizer) Bivalent 12+yrs    3. Morbid (severe) obesity due to excess calories  Class 3 Severe Obesity (BMI >=40). Obesity-related health conditions include the following: hypertension. Obesity is worsening. BMI is is above average; BMI management plan is completed. We discussed low calorie, low carb based diet program, portion control and increasing exercise.        Follow Up     Patient was given instructions and counseling regarding his condition or for health maintenance advice. Please see specific information pulled into the AVS if appropriate.   Any new medication's adverse effects have been discussed in detail with patient  Patient was encouraged to keep me informed of any acute changes, lack of improvement, or any new concerning symptoms.    Return in about 4 weeks (around 5/29/2023) for  Recheck.          Dictated Utilizing Dragon Dictation   Please note that portions of this note were completed with a voice recognition program.   Part of this note may be an electronic transcription/translation of spoken language to printed text using the Dragon Dictation System.

## 2023-05-30 ENCOUNTER — TELEPHONE (OUTPATIENT)
Dept: INTERNAL MEDICINE | Facility: CLINIC | Age: 33
End: 2023-05-30

## 2023-05-30 NOTE — TELEPHONE ENCOUNTER
LVM for patient notifying him that Lynda Art does not have any openings today but can be seen by another provider in office for this or to go to [facility]

## 2023-05-30 NOTE — TELEPHONE ENCOUNTER
Caller: Dawit Schmitz    Relationship to patient: Self    Best call back number: [phone]    Chief complaint: STICHES REMOVED FROM EYEBROW    Type of visit: IN-OFFICE PROCEDURE    Requested date: TODAY OR TOMORROW    If rescheduling, when is the original appointment:     Additional notes:

## 2023-06-01 ENCOUNTER — OFFICE VISIT (OUTPATIENT)
Dept: INTERNAL MEDICINE | Facility: CLINIC | Age: 33
End: 2023-06-01

## 2023-06-01 VITALS
OXYGEN SATURATION: 97 % | DIASTOLIC BLOOD PRESSURE: 68 MMHG | HEART RATE: 84 BPM | WEIGHT: 315 LBS | TEMPERATURE: 97.7 F | BODY MASS INDEX: 40.43 KG/M2 | SYSTOLIC BLOOD PRESSURE: 104 MMHG | HEIGHT: 74 IN

## 2023-06-01 DIAGNOSIS — S01.81XD FACIAL LACERATION, SUBSEQUENT ENCOUNTER: Primary | ICD-10-CM

## 2023-06-01 DIAGNOSIS — Z48.02 ENCOUNTER FOR REMOVAL OF SUTURES: ICD-10-CM

## 2023-06-01 DIAGNOSIS — S09.90XD INJURY OF HEAD, SUBSEQUENT ENCOUNTER: ICD-10-CM

## 2023-06-01 NOTE — PROGRESS NOTES
Office Note     Name: Dawit Schmitz    : 1990     MRN: [id number]     Chief Complaint  Suture / Staple Removal (On eyebrow need stitches removed had a wrestling match rec'd stitches last week ) and Head Injury    Subjective     History of Present Illness:  Dawit Schmitz is a 33 y.o. male who presents today for    Follow up on head injury resulting in a facial laceration at a wrestling event 10 days ago.  Patient bumped heads with another wrestling during a match and had an open wound over the left upper eyelid. Denies any LOC at the time, no headaches, no vision changes, no dizziness, no confusion. Patient has been doing well since. He was seen at the emergency room and had 4 sutures placed. No abnormal discharge, no complications with the sutures.    Review of Systems:   Review of Systems   All other systems reviewed and are negative.      Past Medical History:   Past Medical History:   Diagnosis Date   • Acquired hypothyroidism 2021   • Ankle sprain     left   • Anxiety    • Depression    • Essential hypertension 2021   • Fracture, foot     growth plate in right heel   • Gastroesophageal reflux disease 2017   • Hypothyroidism    • Plantar fasciitis 2017   • Rotator cuff syndrome     right   • Tennis elbow        Past Surgical History: History reviewed. No pertinent surgical history.    Family History:   Family History   Problem Relation Age of Onset   • Diabetes Paternal Uncle    • Lung cancer Maternal Grandmother    • Hypertension Mother    • Thyroid disease Mother        Social History:   Social History     Socioeconomic History   • Marital status: Single   Tobacco Use   • Smoking status: Former     Packs/day: 0.50     Years: 9.00     Pack years: 4.50     Types: Cigarettes     Start date: 2010     Quit date: 2019     Years since quittin.4   • Smokeless tobacco: Former     Types: Snuff, Chew     Quit date: 2018   Vaping Use   • Vaping Use: Never used  "  Substance and Sexual Activity   • Alcohol use: No   • Drug use: Yes     Frequency: 7.0 times per week     Types: Marijuana   • Sexual activity: Yes       Immunizations:   Immunization History   Administered Date(s) Administered   • COVID-19 (PFIZER) BIVALENT 12+YRS 05/01/2023   • COVID-19 (PFIZER) Purple Cap Monovalent 05/19/2021, 08/19/2021   • Covid-19 (Pfizer) Gray Cap Monovalent 06/29/2022   • Hep B, Adolescent or Pediatric 10/05/2000, 11/09/2000   • Tdap 06/29/2022        Medications:     Current Outpatient Medications:   •  amLODIPine (NORVASC) 5 MG tablet, Take 1 tablet by mouth Daily., Disp: 90 tablet, Rfl: 1  •  cyclobenzaprine (FLEXERIL) 10 MG tablet, Take 1 tablet by mouth 3 (Three) Times a Day As Needed for Muscle Spasms., Disp: 60 tablet, Rfl: 1  •  hydrOXYzine (ATARAX) 50 MG tablet, Take 1 tablet by mouth Every 8 (Eight) Hours As Needed for Anxiety., Disp: 90 tablet, Rfl: 6  •  levothyroxine (SYNTHROID, LEVOTHROID) 75 MCG tablet, Take 1 tablet by mouth Daily., Disp: 90 tablet, Rfl: 3  •  lisinopril (PRINIVIL,ZESTRIL) 40 MG tablet, Take 1 tablet by mouth Daily., Disp: 90 tablet, Rfl: 1  •  naproxen (NAPROSYN) 500 MG tablet, Take 1 tablet by mouth 2 (Two) Times a Day As Needed for Moderate Pain., Disp: 60 tablet, Rfl: 1    Allergies:   Allergies   Allergen Reactions   • Prozac [Fluoxetine Hcl] Anxiety     Worsened anxiety.        Objective     Vital Signs  /68   Pulse 84   Temp 97.7 °F (36.5 °C) (Temporal)   Ht 186.7 cm (73.5\")   Wt (!) 172 kg (380 lb)   SpO2 97%   BMI 49.45 kg/m²   Estimated body mass index is 49.45 kg/m² as calculated from the following:    Height as of this encounter: 186.7 cm (73.5\").    Weight as of this encounter: 172 kg (380 lb).          Physical Exam  Constitutional:       Appearance: Normal appearance.   Cardiovascular:      Rate and Rhythm: Normal rate and regular rhythm.      Pulses: Normal pulses.      Heart sounds: Normal heart sounds.   Pulmonary:      " Effort: Pulmonary effort is normal.      Breath sounds: Normal breath sounds.   Skin:         Neurological:      Mental Status: He is alert and oriented to person, place, and time.          Result Review :          Suture Removal    Date/Time: 6/1/2023 1:46 PM  Performed by: Salvatore Gil MD  Authorized by: Salvatore Gil MD   Body area: head/neck  Location details: left eyelid  Wound Appearance: clean  Sutures Removed: 4  Post-removal: dressing applied  Patient tolerance: patient tolerated the procedure well with no immediate complications             Assessment and Plan     1. Injury of head, subsequent encounter  2. Facial laceration, subsequent encounter  3. Encounter for removal of sutures  Reviewed hospital records, patient neuro exam WNL, well cleaned laceration repair.             Follow Up  No follow-ups on file.    Salvatore Gil MD  [facility]  [facility]  [address] [facility]  [address] [address]  [phone]

## 2023-06-12 ENCOUNTER — OFFICE VISIT (OUTPATIENT)
Dept: INTERNAL MEDICINE | Facility: CLINIC | Age: 33
End: 2023-06-12
Payer: COMMERCIAL

## 2023-06-12 VITALS
WEIGHT: 315 LBS | HEIGHT: 74 IN | TEMPERATURE: 97.7 F | OXYGEN SATURATION: 95 % | SYSTOLIC BLOOD PRESSURE: 118 MMHG | RESPIRATION RATE: 18 BRPM | BODY MASS INDEX: 40.43 KG/M2 | HEART RATE: 82 BPM | DIASTOLIC BLOOD PRESSURE: 80 MMHG

## 2023-06-12 DIAGNOSIS — E03.9 ACQUIRED HYPOTHYROIDISM: ICD-10-CM

## 2023-06-12 DIAGNOSIS — E66.01 MORBID (SEVERE) OBESITY DUE TO EXCESS CALORIES: ICD-10-CM

## 2023-06-12 DIAGNOSIS — I10 ESSENTIAL HYPERTENSION: Primary | ICD-10-CM

## 2023-06-12 NOTE — PROGRESS NOTES
"Subjective   Dawit Schmitz is a 33 y.o. male.     Chief Complaint   Patient presents with    Hypertension     Recheck        PCP: Lynda Art APRN    History of Present Illness   The patient presents today for a follow-up regarding hypertension. At his last visit, blood pressure was uncontrolled, so we continued lisinopril and added amlodipine.    He feels he is doing fine with amlodipine. The patient planned to monitor his blood pressure at home; however, he notes there was an issue with the cuff secondary to his wife using it a lot. He states, \"I feel like I have less high blood pressure feelings\". He denies headaches or dizziness, visual disturbances, blurry vision, double vision. The patient denies any chest pain, shortness of breath, heart palpitations or any swelling in his feet and ankles. He denies any changes.     Last month he had an ER visit and a visit with Salvatore Gil MD, as well as the first of this month regarding facial laceration. On the Monday prior , he wrestled a young male and did a suplex, the timing was slightly off. As the other male came up as the patient was coming down, the young male's cheekbone struck his left eye. He wiped the area, and he noted it was \"real wet\".  The patient received stitches; however, he states \"4 stitches later and I felt like 2 of them popped.\" He felt his eyebrow went straight and then \"drooped down a little\".     The patient seees Indira Clement MD for a physical for sports wrestling and is due at beginning of 2024    He goes to [facility] for bioidentical pellets. The pellets are time-released and the bar wants the patient to go up to a normal range for his testosterone. He is in his 4th week. The patient tells me that he was informed secondary to how much he lifts and how big he is, he will complete this in 5 months. He will complete blood work on 06/15/2023. If he is not above 800 ng/dL, he will receive a boost. His blood is " checked every month.    He requests refills for his current medications.    The following portions of the patient's history were reviewed and updated as appropriate: allergies, current medications, past family history, past medical history, past social history, past surgical history and problem list.        Review of Systems   Constitutional:  Negative for fatigue, fever and unexpected weight loss.   Eyes:  Negative for blurred vision, double vision and visual disturbance.   Respiratory:  Negative for cough, shortness of breath and wheezing.    Cardiovascular:  Negative for chest pain, palpitations and leg swelling.   Gastrointestinal:  Negative for abdominal pain, constipation, diarrhea, nausea and vomiting.   Genitourinary:  Negative for difficulty urinating, frequency and urgency.   Musculoskeletal:  Negative for arthralgias and myalgias.   Skin:  Negative for color change and rash.   Neurological:  Negative for dizziness, weakness and headache.   Hematological:  Negative for adenopathy. Does not bruise/bleed easily.         Outpatient Medications Marked as Taking for the 6/12/23 encounter (Office Visit) with Lynda Art APRN   Medication Sig Dispense Refill    amLODIPine (NORVASC) 5 MG tablet Take 1 tablet by mouth Daily. 90 tablet 1    cyclobenzaprine (FLEXERIL) 10 MG tablet Take 1 tablet by mouth 3 (Three) Times a Day As Needed for Muscle Spasms. 60 tablet 1    hydrOXYzine (ATARAX) 50 MG tablet Take 1 tablet by mouth Every 8 (Eight) Hours As Needed for Anxiety. 90 tablet 6    levothyroxine (SYNTHROID, LEVOTHROID) 75 MCG tablet Take 1 tablet by mouth Daily. 90 tablet 3    lisinopril (PRINIVIL,ZESTRIL) 40 MG tablet Take 1 tablet by mouth Daily. 90 tablet 1    naproxen (NAPROSYN) 500 MG tablet Take 1 tablet by mouth 2 (Two) Times a Day As Needed for Moderate Pain. 60 tablet 1    NON FORMULARY Testosterone pellet therapy       Allergies   Allergen Reactions    Prozac [Fluoxetine Hcl] Anxiety     Worsened  "anxiety.            Objective   Physical Exam  Vitals and nursing note reviewed.   Constitutional:       Appearance: Normal appearance. He is well-developed.   HENT:      Head: Normocephalic and atraumatic.   Eyes:      Conjunctiva/sclera: Conjunctivae normal.   Cardiovascular:      Rate and Rhythm: Normal rate and regular rhythm.      Heart sounds: Normal heart sounds.   Pulmonary:      Effort: Pulmonary effort is normal. No respiratory distress.      Breath sounds: Normal breath sounds.   Abdominal:      General: Bowel sounds are normal. There is no distension.      Palpations: Abdomen is soft.      Tenderness: There is no abdominal tenderness.   Musculoskeletal:      Cervical back: Normal range of motion.   Skin:     General: Skin is warm and dry.      Comments: Well-healed scar area to left eyebrow area.   Neurological:      Mental Status: He is alert and oriented to person, place, and time.   Psychiatric:         Speech: Speech normal.         Behavior: Behavior normal.         Thought Content: Thought content normal.         Judgment: Judgment normal.       Vitals:    06/12/23 0958   BP: 118/80   BP Location: Left arm   Patient Position: Sitting   Cuff Size: Large Adult   Pulse: 82   Resp: 18   Temp: 97.7 °F (36.5 °C)   TempSrc: Infrared   SpO2: 95%   Weight: (!) 172 kg (380 lb)   Height: 186.7 cm (73.5\")     Body mass index is 49.46 kg/m².  Wt Readings from Last 3 Encounters:   06/12/23 (!) 172 kg (380 lb)   06/01/23 (!) 172 kg (380 lb)   05/01/23 (!) 169 kg (372 lb 12.8 oz)             Assessment & Plan   Diagnoses and all orders for this visit:    1. Essential hypertension (Primary)    2. Acquired hypothyroidism    3. Morbid (severe) obesity due to excess calories        1. Hypertension.  Blood pressure looks great. Continue current medications.    2. Hypothyroid   Stable-cont current rx    3. Obesity  Patient's (Body mass index is 49.46 kg/m².) indicates that they are morbidly obese (BMI > 40 or > 35 with " obesity - related health condition) with health related conditions that include hypertension . Weight is unchanged. BMI is is above average; BMI management plan is completed. We discussed low calorie, low carb based diet program, portion control, and increasing exercise.   Return to clinic in 3 months. Bring lab results to me. Schedule with Dr. Dumont.          Return in about 3 months (around 9/12/2023) for chronic condition follow up.    I discussed my findings,recommendations, and plan of care was with the patient. They verbalized understanding and agreement.  Patient was encouraged to keep me informed of any acute changes, lack of improvement, or any new concerning symptoms.     Transcribed from ambient dictation for NORBERTO Coulter by Remy Mcgrath.  06/12/23   12:37 EDT    Patient or patient representative verbalized consent to the visit recording.  I have personally performed the services described in this document as transcribed by the above individual, and it is both accurate and complete.  NORBERTO Coulter  6/12/2023  13:02 EDT

## 2023-08-27 DIAGNOSIS — R52 PAIN: ICD-10-CM

## 2023-08-27 DIAGNOSIS — I10 ESSENTIAL HYPERTENSION: ICD-10-CM

## 2023-08-29 RX ORDER — AMLODIPINE BESYLATE 5 MG/1
TABLET ORAL
Qty: 90 TABLET | Refills: 0 | OUTPATIENT
Start: 2023-08-29

## 2023-08-29 RX ORDER — NAPROXEN 500 MG/1
TABLET ORAL
Qty: 60 TABLET | Refills: 0 | Status: SHIPPED | OUTPATIENT
Start: 2023-08-29

## 2023-08-29 NOTE — TELEPHONE ENCOUNTER
Rx Refill Note  Requested Prescriptions     Pending Prescriptions Disp Refills    naproxen (NAPROSYN) 500 MG tablet [Pharmacy Med Name: Naproxen Oral Tablet 500 MG] 60 tablet 0     Sig: TAKE 1 TABLET BY MOUTH 2 TIMES A DAY AS NEEDED FOR MODERATE PAIN     Refused Prescriptions Disp Refills    amLODIPine (NORVASC) 5 MG tablet [Pharmacy Med Name: amLODIPine Besylate Oral Tablet 5 MG] 90 tablet 0     Sig: TAKE ONE TABLET BY MOUTH DAILY      Last office visit with prescribing clinician: 6/12/2023     Next office visit with prescribing clinician: 9/12/2023       Sheryl Hoffman MA  08/29/23, 12:57 EDT

## 2023-09-12 ENCOUNTER — OFFICE VISIT (OUTPATIENT)
Dept: INTERNAL MEDICINE | Facility: CLINIC | Age: 33
End: 2023-09-12
Payer: COMMERCIAL

## 2023-09-12 ENCOUNTER — LAB (OUTPATIENT)
Dept: INTERNAL MEDICINE | Facility: CLINIC | Age: 33
End: 2023-09-12
Payer: COMMERCIAL

## 2023-09-12 VITALS
RESPIRATION RATE: 18 BRPM | TEMPERATURE: 97.5 F | WEIGHT: 315 LBS | DIASTOLIC BLOOD PRESSURE: 90 MMHG | HEART RATE: 80 BPM | HEIGHT: 74 IN | OXYGEN SATURATION: 96 % | BODY MASS INDEX: 40.43 KG/M2 | SYSTOLIC BLOOD PRESSURE: 126 MMHG

## 2023-09-12 DIAGNOSIS — E03.9 ACQUIRED HYPOTHYROIDISM: ICD-10-CM

## 2023-09-12 DIAGNOSIS — I10 ESSENTIAL HYPERTENSION: ICD-10-CM

## 2023-09-12 DIAGNOSIS — E66.01 MORBID (SEVERE) OBESITY DUE TO EXCESS CALORIES: ICD-10-CM

## 2023-09-12 DIAGNOSIS — I10 ESSENTIAL HYPERTENSION: Primary | ICD-10-CM

## 2023-09-12 LAB
DEPRECATED RDW RBC AUTO: 44.3 FL (ref 37–54)
ERYTHROCYTE [DISTWIDTH] IN BLOOD BY AUTOMATED COUNT: 13 % (ref 12.3–15.4)
HCT VFR BLD AUTO: 45.8 % (ref 37.5–51)
HGB BLD-MCNC: 15.3 G/DL (ref 13–17.7)
MCH RBC QN AUTO: 31 PG (ref 26.6–33)
MCHC RBC AUTO-ENTMCNC: 33.4 G/DL (ref 31.5–35.7)
MCV RBC AUTO: 92.7 FL (ref 79–97)
PLATELET # BLD AUTO: 267 10*3/MM3 (ref 140–450)
PMV BLD AUTO: 11.8 FL (ref 6–12)
RBC # BLD AUTO: 4.94 10*6/MM3 (ref 4.14–5.8)
WBC NRBC COR # BLD: 7.73 10*3/MM3 (ref 3.4–10.8)

## 2023-09-12 PROCEDURE — 80061 LIPID PANEL: CPT | Performed by: NURSE PRACTITIONER

## 2023-09-12 PROCEDURE — 80050 GENERAL HEALTH PANEL: CPT | Performed by: NURSE PRACTITIONER

## 2023-09-12 PROCEDURE — 36415 COLL VENOUS BLD VENIPUNCTURE: CPT | Performed by: NURSE PRACTITIONER

## 2023-09-12 RX ORDER — LISINOPRIL 40 MG/1
40 TABLET ORAL DAILY
Qty: 90 TABLET | Refills: 1 | Status: SHIPPED | OUTPATIENT
Start: 2023-09-12

## 2023-09-12 RX ORDER — AMLODIPINE BESYLATE 5 MG/1
5 TABLET ORAL DAILY
Qty: 90 TABLET | Refills: 1 | Status: SHIPPED | OUTPATIENT
Start: 2023-09-12

## 2023-09-12 NOTE — PROGRESS NOTES
Subjective   Dawit Schmitz is a 33 y.o. male.     Chief Complaint   Patient presents with    Hypertension     3 month f/u    Hypothyroidism     3 month f/u    Obesity     3 month f/u       PCP: Lynda Art APRN    Hypertension  Pertinent negatives include no blurred vision, chest pain, palpitations or shortness of breath.   Hypothyroidism  Associated symptoms include arthralgias. Pertinent negatives include no abdominal pain, chest pain, coughing, fatigue, fever, myalgias, nausea, rash, vomiting or weakness.   Obesity  Associated symptoms include arthralgias. Pertinent negatives include no abdominal pain, chest pain, coughing, fatigue, fever, myalgias, nausea, rash, vomiting or weakness.    The patient presents to the office today for 3-month follow-up on hypertension, hypothyroidism, and obesity. He is currently on amlodipine and lisinopril for blood pressure. His blood pressure is 126/90 mmHg in the office today. He is taking levothyroxine 75 mcg daily for hypothyroidism. His TSH, obtained on 01/27/2023, was stable at 1.480 uIU/mL and his CMP was unremarkable.     The patient states he is tolerating his blood pressure medications well without any unusual headaches, shortness of breath, dizziness, or chest pain. He notes he consumes plenty of water, but he recalls his bilateral feet were severely swollen a few days after he injured his right knee. The patient states his swelling has improved since that time and he is now only experiencing end-of-day swelling when active.    Regarding his hypothyroidism, he denies any unusual fatigue, constipation, diarrhea, hair changes, or skin changes.     The patient's weight has increased slightly since his last visit. He reports he has not been exercising given that he injured his knee then purchased a house approximately 2 weeks later. He notes he was focusing on only upper body exercises for 2 to 3 weeks; however, this was exacerbating his knee pain. The patient  "states he has not been consuming a healthy diet.     The patient states he tore his right ACL on 07/13/2023 while wrestling when he landed \"funny\" on the distal aspect of his toe causing his knee to \"go out.\" He was seen the following day, 07/14/2023, by an orthopedic surgeon where an MRI scan was obtained revealing an ACL tear with possible meniscal tear. He is reportedly scheduled for right knee arthroscopy with ACL reconstruction, quadriceps tendon graft, and medial meniscus repair on 09/18/203 with Dr. Baldwin, orthopedic surgeon. The patient states he does not believe he needs any preoperative laboratory studies.     The following portions of the patient's history were reviewed and updated as appropriate: allergies, current medications, past family history, past medical history, past social history, past surgical history and problem list.        Review of Systems   Constitutional:  Negative for fatigue, fever and unexpected weight loss.   Eyes:  Negative for blurred vision, double vision and visual disturbance.   Respiratory:  Negative for cough, shortness of breath and wheezing.    Cardiovascular:  Negative for chest pain, palpitations and leg swelling.   Gastrointestinal:  Negative for abdominal pain, constipation, diarrhea, nausea and vomiting.   Genitourinary:  Negative for difficulty urinating, frequency and urgency.   Musculoskeletal:  Positive for arthralgias. Negative for myalgias.   Skin:  Negative for color change and rash.   Neurological:  Negative for dizziness, weakness and headache.   Hematological:  Negative for adenopathy. Does not bruise/bleed easily.         Outpatient Medications Marked as Taking for the 9/12/23 encounter (Office Visit) with Lynda Art APRN   Medication Sig Dispense Refill    amLODIPine (NORVASC) 5 MG tablet Take 1 tablet by mouth Daily. 90 tablet 1    cyclobenzaprine (FLEXERIL) 10 MG tablet Take 1 tablet by mouth 3 (Three) Times a Day As Needed for Muscle Spasms. 60 " tablet 1    hydrOXYzine (ATARAX) 50 MG tablet Take 1 tablet by mouth Every 8 (Eight) Hours As Needed for Anxiety. 90 tablet 6    levothyroxine (SYNTHROID, LEVOTHROID) 75 MCG tablet Take 1 tablet by mouth Daily. 90 tablet 3    lisinopril (PRINIVIL,ZESTRIL) 40 MG tablet Take 1 tablet by mouth Daily. 90 tablet 1    naproxen (NAPROSYN) 500 MG tablet TAKE 1 TABLET BY MOUTH 2 TIMES A DAY AS NEEDED FOR MODERATE PAIN (Patient taking differently: Take 1 tablet by mouth As Needed for Mild Pain.) 60 tablet 0    NON FORMULARY 1 (One) Time As Needed. Testosterone pellet therapy      [DISCONTINUED] amLODIPine (NORVASC) 5 MG tablet Take 1 tablet by mouth Daily. 90 tablet 1    [DISCONTINUED] lisinopril (PRINIVIL,ZESTRIL) 40 MG tablet Take 1 tablet by mouth Daily. 90 tablet 1     Allergies   Allergen Reactions    Prozac [Fluoxetine Hcl] Anxiety     Worsened anxiety.            Objective   Physical Exam  Vitals and nursing note reviewed.   Constitutional:       Appearance: Normal appearance. He is well-developed. He is morbidly obese.   HENT:      Head: Normocephalic and atraumatic.   Eyes:      Conjunctiva/sclera: Conjunctivae normal.   Cardiovascular:      Rate and Rhythm: Normal rate and regular rhythm.      Heart sounds: Normal heart sounds.   Pulmonary:      Effort: Pulmonary effort is normal. No respiratory distress.      Breath sounds: Normal breath sounds.   Abdominal:      General: Bowel sounds are normal. There is no distension.      Palpations: Abdomen is soft.      Tenderness: There is no abdominal tenderness.   Musculoskeletal:      Cervical back: Normal range of motion.   Skin:     General: Skin is warm and dry.   Neurological:      Mental Status: He is alert and oriented to person, place, and time.   Psychiatric:         Speech: Speech normal.         Behavior: Behavior normal.         Thought Content: Thought content normal.         Judgment: Judgment normal.     TSH Rfx On Abnormal To Free T4 (01/27/2023  "14:59)  Comprehensive Metabolic Panel (01/27/2023 14:59)    Vitals:    09/12/23 1018   BP: 126/90   BP Location: Left arm   Patient Position: Sitting   Cuff Size: Large Adult   Pulse: 80   Resp: 18   Temp: 97.5 °F (36.4 °C)   TempSrc: Infrared   SpO2: 96%   Weight: (!) 175 kg (386 lb 9.6 oz)   Height: 186.7 cm (73.5\")     Body mass index is 50.31 kg/m².  Wt Readings from Last 3 Encounters:   09/12/23 (!) 175 kg (386 lb 9.6 oz)   06/12/23 (!) 172 kg (380 lb)   06/01/23 (!) 172 kg (380 lb)             Assessment & Plan   Diagnoses and all orders for this visit:    1. Essential hypertension (Primary)  -     CBC (No Diff); Future  -     Comprehensive Metabolic Panel; Future  -     Lipid Panel; Future  -     TSH Rfx On Abnormal To Free T4; Future  -     lisinopril (PRINIVIL,ZESTRIL) 40 MG tablet; Take 1 tablet by mouth Daily.  Dispense: 90 tablet; Refill: 1  -     amLODIPine (NORVASC) 5 MG tablet; Take 1 tablet by mouth Daily.  Dispense: 90 tablet; Refill: 1    2. Acquired hypothyroidism  -     CBC (No Diff); Future  -     Comprehensive Metabolic Panel; Future  -     Lipid Panel; Future  -     TSH Rfx On Abnormal To Free T4; Future    3. Morbid (severe) obesity due to excess calories  -     CBC (No Diff); Future  -     Comprehensive Metabolic Panel; Future  -     Lipid Panel; Future  -     TSH Rfx On Abnormal To Free T4; Future      1. Hypertension  - His blood pressure is well controlled in the office today at 126/90 mmHg. Will obtain routine laboratory studies today.     2. Hypothyroidism  - Continue current medication. Will obtain routine laboratory studies.     3. Obesity  - Encouraged patient to focus on a healthy diet to avoid gaining weight while he is awaiting his knee operation and recovering status post. Advised he continue to modify his exercise routine to accommodate his knee.          Return in about 3 months (around 12/12/2023) for chronic condition follow up.    I discussed my findings,recommendations, and " plan of care was with the patient. They verbalized understanding and agreement.  Patient was encouraged to keep me informed of any acute changes, lack of improvement, or any new concerning symptoms.     Transcribed from ambient dictation for NORBERTO Coulter by Zonia Gamble.  09/12/23   13:39 EDT    Patient or patient representative verbalized consent to the visit recording.  I have personally performed the services described in this document as transcribed by the above individual, and it is both accurate and complete.  NORBERTO Coulter  9/17/2023  08:58 EDT

## 2023-09-13 LAB
ALBUMIN SERPL-MCNC: 4.2 G/DL (ref 3.5–5.2)
ALBUMIN/GLOB SERPL: 1.5 G/DL
ALP SERPL-CCNC: 66 U/L (ref 39–117)
ALT SERPL W P-5'-P-CCNC: 28 U/L (ref 1–41)
ANION GAP SERPL CALCULATED.3IONS-SCNC: 9 MMOL/L (ref 5–15)
AST SERPL-CCNC: 27 U/L (ref 1–40)
BILIRUB SERPL-MCNC: <0.2 MG/DL (ref 0–1.2)
BUN SERPL-MCNC: 15 MG/DL (ref 6–20)
BUN/CREAT SERPL: 12.2 (ref 7–25)
CALCIUM SPEC-SCNC: 10 MG/DL (ref 8.6–10.5)
CHLORIDE SERPL-SCNC: 106 MMOL/L (ref 98–107)
CHOLEST SERPL-MCNC: 130 MG/DL (ref 0–200)
CO2 SERPL-SCNC: 25 MMOL/L (ref 22–29)
CREAT SERPL-MCNC: 1.23 MG/DL (ref 0.76–1.27)
EGFRCR SERPLBLD CKD-EPI 2021: 79.5 ML/MIN/1.73
GLOBULIN UR ELPH-MCNC: 2.8 GM/DL
GLUCOSE SERPL-MCNC: 96 MG/DL (ref 65–99)
HDLC SERPL-MCNC: 45 MG/DL (ref 40–60)
LDLC SERPL CALC-MCNC: 75 MG/DL (ref 0–100)
LDLC/HDLC SERPL: 1.7 {RATIO}
POTASSIUM SERPL-SCNC: 4.6 MMOL/L (ref 3.5–5.2)
PROT SERPL-MCNC: 7 G/DL (ref 6–8.5)
SODIUM SERPL-SCNC: 140 MMOL/L (ref 136–145)
TRIGL SERPL-MCNC: 43 MG/DL (ref 0–150)
TSH SERPL DL<=0.05 MIU/L-ACNC: 3.31 UIU/ML (ref 0.27–4.2)
VLDLC SERPL-MCNC: 10 MG/DL (ref 5–40)

## 2023-09-15 ENCOUNTER — ANESTHESIA EVENT (OUTPATIENT)
Dept: PERIOP | Facility: HOSPITAL | Age: 33
End: 2023-09-15
Payer: COMMERCIAL

## 2023-09-15 RX ORDER — SODIUM CHLORIDE 9 MG/ML
40 INJECTION, SOLUTION INTRAVENOUS AS NEEDED
Status: CANCELLED | OUTPATIENT
Start: 2023-09-15

## 2023-09-15 RX ORDER — FAMOTIDINE 10 MG/ML
20 INJECTION, SOLUTION INTRAVENOUS ONCE
Status: CANCELLED | OUTPATIENT
Start: 2023-09-15 | End: 2023-09-15

## 2023-09-15 RX ORDER — SODIUM CHLORIDE 0.9 % (FLUSH) 0.9 %
10 SYRINGE (ML) INJECTION EVERY 12 HOURS SCHEDULED
Status: CANCELLED | OUTPATIENT
Start: 2023-09-15

## 2023-09-15 RX ORDER — SODIUM CHLORIDE 0.9 % (FLUSH) 0.9 %
10 SYRINGE (ML) INJECTION AS NEEDED
Status: CANCELLED | OUTPATIENT
Start: 2023-09-15

## 2023-09-18 ENCOUNTER — ANESTHESIA EVENT CONVERTED (OUTPATIENT)
Dept: ANESTHESIOLOGY | Facility: HOSPITAL | Age: 33
End: 2023-09-18
Payer: COMMERCIAL

## 2023-09-18 ENCOUNTER — ANESTHESIA (OUTPATIENT)
Dept: PERIOP | Facility: HOSPITAL | Age: 33
End: 2023-09-18
Payer: COMMERCIAL

## 2023-09-18 ENCOUNTER — HOSPITAL ENCOUNTER (OUTPATIENT)
Facility: HOSPITAL | Age: 33
Setting detail: HOSPITAL OUTPATIENT SURGERY
Discharge: HOME | End: 2023-09-18
Attending: ORTHOPAEDIC SURGERY | Admitting: ORTHOPAEDIC SURGERY
Payer: COMMERCIAL

## 2023-09-18 VITALS
BODY MASS INDEX: 40.43 KG/M2 | HEART RATE: 86 BPM | DIASTOLIC BLOOD PRESSURE: 81 MMHG | TEMPERATURE: 97.5 F | SYSTOLIC BLOOD PRESSURE: 133 MMHG | RESPIRATION RATE: 15 BRPM | OXYGEN SATURATION: 93 % | WEIGHT: 315 LBS | HEIGHT: 74 IN

## 2023-09-18 LAB
QT INTERVAL: 346 MS
QTC INTERVAL: 406 MS

## 2023-09-18 PROCEDURE — C1713 ANCHOR/SCREW BN/BN,TIS/BN: HCPCS | Performed by: ORTHOPAEDIC SURGERY

## 2023-09-18 PROCEDURE — 25010000002 CEFAZOLIN PER 500 MG

## 2023-09-18 PROCEDURE — 25010000002 ONDANSETRON PER 1 MG

## 2023-09-18 PROCEDURE — 25010000002 EPINEPHRINE PER 0.1 MG: Performed by: ORTHOPAEDIC SURGERY

## 2023-09-18 PROCEDURE — 25010000002 DEXAMETHASONE SODIUM PHOSPHATE 10 MG/ML SOLUTION: Performed by: ANESTHESIOLOGY

## 2023-09-18 PROCEDURE — L1833 KO ADJ JNT POS R SUP PRE OTS: HCPCS | Performed by: ORTHOPAEDIC SURGERY

## 2023-09-18 PROCEDURE — 25010000002 SUGAMMADEX 200 MG/2ML SOLUTION: Performed by: ANESTHESIOLOGY

## 2023-09-18 PROCEDURE — 25010000002 ROPIVACAINE PER 1 MG: Performed by: NURSE ANESTHETIST, CERTIFIED REGISTERED

## 2023-09-18 PROCEDURE — C1889 IMPLANT/INSERT DEVICE, NOC: HCPCS | Performed by: ORTHOPAEDIC SURGERY

## 2023-09-18 PROCEDURE — 25010000002 HYDROMORPHONE 1 MG/ML SOLUTION

## 2023-09-18 PROCEDURE — 93005 ELECTROCARDIOGRAM TRACING: CPT | Performed by: ANESTHESIOLOGY

## 2023-09-18 PROCEDURE — 25010000002 PROPOFOL 10 MG/ML EMULSION: Performed by: ANESTHESIOLOGY

## 2023-09-18 PROCEDURE — 25010000002 ONDANSETRON PER 1 MG: Performed by: ANESTHESIOLOGY

## 2023-09-18 PROCEDURE — 25010000002 VANCOMYCIN 1 G RECONSTITUTED SOLUTION 1 EACH VIAL: Performed by: ORTHOPAEDIC SURGERY

## 2023-09-18 PROCEDURE — 25010000002 BUPIVACAINE (PF) 0.25 % SOLUTION: Performed by: NURSE ANESTHETIST, CERTIFIED REGISTERED

## 2023-09-18 PROCEDURE — 25010000002 FENTANYL CITRATE (PF) 50 MCG/ML SOLUTION: Performed by: NURSE ANESTHETIST, CERTIFIED REGISTERED

## 2023-09-18 PROCEDURE — 25010000002 FENTANYL CITRATE (PF) 50 MCG/ML SOLUTION

## 2023-09-18 DEVICE — IMPLANTABLE DEVICE: Type: IMPLANTABLE DEVICE | Site: KNEE | Status: FUNCTIONAL

## 2023-09-18 DEVICE — SUT FW #2 W/TPR NDL 1/2 CIR 38IN 97CM 26.5MM BLU: Type: IMPLANTABLE DEVICE | Site: KNEE | Status: FUNCTIONAL

## 2023-09-18 RX ORDER — MAGNESIUM HYDROXIDE 1200 MG/15ML
LIQUID ORAL AS NEEDED
Status: DISCONTINUED | OUTPATIENT
Start: 2023-09-18 | End: 2023-09-18 | Stop reason: HOSPADM

## 2023-09-18 RX ORDER — BUPIVACAINE HYDROCHLORIDE 2.5 MG/ML
INJECTION, SOLUTION EPIDURAL; INFILTRATION; INTRACAUDAL
Status: COMPLETED | OUTPATIENT
Start: 2023-09-18 | End: 2023-09-18

## 2023-09-18 RX ORDER — BUPIVACAINE HCL/0.9 % NACL/PF 0.125 %
PLASTIC BAG, INJECTION (ML) EPIDURAL AS NEEDED
Status: DISCONTINUED | OUTPATIENT
Start: 2023-09-18 | End: 2023-09-18 | Stop reason: SURG

## 2023-09-18 RX ORDER — FENTANYL CITRATE 50 UG/ML
50 INJECTION, SOLUTION INTRAMUSCULAR; INTRAVENOUS
Status: DISCONTINUED | OUTPATIENT
Start: 2023-09-18 | End: 2023-09-18 | Stop reason: HOSPADM

## 2023-09-18 RX ORDER — OXYCODONE HYDROCHLORIDE 5 MG/1
5 TABLET ORAL ONCE
Status: COMPLETED | OUTPATIENT
Start: 2023-09-18 | End: 2023-09-18

## 2023-09-18 RX ORDER — ROPIVACAINE HYDROCHLORIDE 2 MG/ML
INJECTION, SOLUTION EPIDURAL; INFILTRATION; PERINEURAL CONTINUOUS
Status: DISCONTINUED | OUTPATIENT
Start: 2023-09-18 | End: 2023-09-18 | Stop reason: HOSPADM

## 2023-09-18 RX ORDER — LIDOCAINE HYDROCHLORIDE 10 MG/ML
0.5 INJECTION, SOLUTION EPIDURAL; INFILTRATION; INTRACAUDAL; PERINEURAL ONCE AS NEEDED
Status: COMPLETED | OUTPATIENT
Start: 2023-09-18 | End: 2023-09-18

## 2023-09-18 RX ORDER — ONDANSETRON 2 MG/ML
INJECTION INTRAMUSCULAR; INTRAVENOUS
Status: COMPLETED
Start: 2023-09-18 | End: 2023-09-18

## 2023-09-18 RX ORDER — ONDANSETRON 2 MG/ML
4 INJECTION INTRAMUSCULAR; INTRAVENOUS ONCE AS NEEDED
Status: COMPLETED | OUTPATIENT
Start: 2023-09-18 | End: 2023-09-18

## 2023-09-18 RX ORDER — EPHEDRINE SULFATE 50 MG/ML
5 INJECTION, SOLUTION INTRAVENOUS ONCE AS NEEDED
Status: DISCONTINUED | OUTPATIENT
Start: 2023-09-18 | End: 2023-09-18 | Stop reason: HOSPADM

## 2023-09-18 RX ORDER — MIDAZOLAM HYDROCHLORIDE 1 MG/ML
1 INJECTION INTRAMUSCULAR; INTRAVENOUS
Status: DISCONTINUED | OUTPATIENT
Start: 2023-09-18 | End: 2023-09-18 | Stop reason: HOSPADM

## 2023-09-18 RX ORDER — SODIUM CHLORIDE, SODIUM LACTATE, POTASSIUM CHLORIDE, CALCIUM CHLORIDE 600; 310; 30; 20 MG/100ML; MG/100ML; MG/100ML; MG/100ML
100 INJECTION, SOLUTION INTRAVENOUS CONTINUOUS
Status: DISCONTINUED | OUTPATIENT
Start: 2023-09-18 | End: 2023-09-18 | Stop reason: HOSPADM

## 2023-09-18 RX ORDER — BUPIVACAINE HYDROCHLORIDE 2.5 MG/ML
INJECTION, SOLUTION EPIDURAL; INFILTRATION; INTRACAUDAL
Status: DISCONTINUED | OUTPATIENT
Start: 2023-09-18 | End: 2023-09-18 | Stop reason: SURG

## 2023-09-18 RX ORDER — DEXAMETHASONE SODIUM PHOSPHATE 10 MG/ML
INJECTION, SOLUTION INTRAMUSCULAR; INTRAVENOUS AS NEEDED
Status: DISCONTINUED | OUTPATIENT
Start: 2023-09-18 | End: 2023-09-18 | Stop reason: SURG

## 2023-09-18 RX ORDER — ACETAMINOPHEN 500 MG
1000 TABLET ORAL ONCE
Status: COMPLETED | OUTPATIENT
Start: 2023-09-18 | End: 2023-09-18

## 2023-09-18 RX ORDER — FAMOTIDINE 20 MG/1
20 TABLET, FILM COATED ORAL ONCE
Status: COMPLETED | OUTPATIENT
Start: 2023-09-18 | End: 2023-09-18

## 2023-09-18 RX ORDER — CEFAZOLIN SODIUM IN 0.9 % NACL 3 G/100 ML
3000 INTRAVENOUS SOLUTION, PIGGYBACK (ML) INTRAVENOUS ONCE
Status: COMPLETED | OUTPATIENT
Start: 2023-09-18 | End: 2023-09-18

## 2023-09-18 RX ORDER — NALOXONE HCL 0.4 MG/ML
0.4 VIAL (ML) INJECTION AS NEEDED
Status: DISCONTINUED | OUTPATIENT
Start: 2023-09-18 | End: 2023-09-18 | Stop reason: HOSPADM

## 2023-09-18 RX ORDER — ACETAMINOPHEN 500 MG
TABLET ORAL
Status: COMPLETED
Start: 2023-09-18 | End: 2023-09-18

## 2023-09-18 RX ORDER — EPHEDRINE SULFATE 50 MG/ML
INJECTION INTRAVENOUS AS NEEDED
Status: DISCONTINUED | OUTPATIENT
Start: 2023-09-18 | End: 2023-09-18 | Stop reason: SDUPTHER

## 2023-09-18 RX ORDER — ROCURONIUM BROMIDE 10 MG/ML
INJECTION, SOLUTION INTRAVENOUS AS NEEDED
Status: DISCONTINUED | OUTPATIENT
Start: 2023-09-18 | End: 2023-09-18 | Stop reason: SURG

## 2023-09-18 RX ORDER — MEPERIDINE HYDROCHLORIDE 25 MG/ML
12.5 INJECTION INTRAMUSCULAR; INTRAVENOUS; SUBCUTANEOUS
Status: DISCONTINUED | OUTPATIENT
Start: 2023-09-18 | End: 2023-09-18 | Stop reason: HOSPADM

## 2023-09-18 RX ORDER — SUCCINYLCHOLINE/SOD CL,ISO/PF 200MG/10ML
SYRINGE (ML) INTRAVENOUS AS NEEDED
Status: DISCONTINUED | OUTPATIENT
Start: 2023-09-18 | End: 2023-09-18 | Stop reason: SURG

## 2023-09-18 RX ORDER — SODIUM CHLORIDE, SODIUM LACTATE, POTASSIUM CHLORIDE, CALCIUM CHLORIDE 600; 310; 30; 20 MG/100ML; MG/100ML; MG/100ML; MG/100ML
9 INJECTION, SOLUTION INTRAVENOUS CONTINUOUS
Status: DISCONTINUED | OUTPATIENT
Start: 2023-09-18 | End: 2023-09-18 | Stop reason: HOSPADM

## 2023-09-18 RX ORDER — CEFAZOLIN SODIUM 2 G/100ML
2000 INJECTION, SOLUTION INTRAVENOUS ONCE
Status: DISCONTINUED | OUTPATIENT
Start: 2023-09-18 | End: 2023-09-18

## 2023-09-18 RX ORDER — OXYCODONE HYDROCHLORIDE 5 MG/1
TABLET ORAL
Status: COMPLETED
Start: 2023-09-18 | End: 2023-09-18

## 2023-09-18 RX ORDER — FENTANYL CITRATE 50 UG/ML
INJECTION, SOLUTION INTRAMUSCULAR; INTRAVENOUS
Status: COMPLETED | OUTPATIENT
Start: 2023-09-18 | End: 2023-09-18

## 2023-09-18 RX ORDER — PROPOFOL 10 MG/ML
VIAL (ML) INTRAVENOUS AS NEEDED
Status: DISCONTINUED | OUTPATIENT
Start: 2023-09-18 | End: 2023-09-18 | Stop reason: SURG

## 2023-09-18 RX ORDER — DEXMEDETOMIDINE HYDROCHLORIDE 100 UG/ML
INJECTION, SOLUTION INTRAVENOUS AS NEEDED
Status: DISCONTINUED | OUTPATIENT
Start: 2023-09-18 | End: 2023-09-18 | Stop reason: SURG

## 2023-09-18 RX ORDER — ONDANSETRON 2 MG/ML
INJECTION INTRAMUSCULAR; INTRAVENOUS AS NEEDED
Status: DISCONTINUED | OUTPATIENT
Start: 2023-09-18 | End: 2023-09-18 | Stop reason: SDUPTHER

## 2023-09-18 RX ORDER — FENTANYL CITRATE 50 UG/ML
INJECTION, SOLUTION INTRAMUSCULAR; INTRAVENOUS
Status: COMPLETED
Start: 2023-09-18 | End: 2023-09-18

## 2023-09-18 RX ORDER — LIDOCAINE HYDROCHLORIDE 10 MG/ML
INJECTION, SOLUTION EPIDURAL; INFILTRATION; INTRACAUDAL; PERINEURAL AS NEEDED
Status: DISCONTINUED | OUTPATIENT
Start: 2023-09-18 | End: 2023-09-18 | Stop reason: SURG

## 2023-09-18 RX ORDER — HYDROMORPHONE HYDROCHLORIDE 1 MG/ML
0.5 INJECTION, SOLUTION INTRAMUSCULAR; INTRAVENOUS; SUBCUTANEOUS
Status: DISCONTINUED | OUTPATIENT
Start: 2023-09-18 | End: 2023-09-18 | Stop reason: HOSPADM

## 2023-09-18 RX ADMIN — ROCURONIUM BROMIDE 20 MG: 10 SOLUTION INTRAVENOUS at 14:18

## 2023-09-18 RX ADMIN — Medication 100 MCG: at 14:01

## 2023-09-18 RX ADMIN — LIDOCAINE HYDROCHLORIDE 0.5 ML: 10 INJECTION, SOLUTION EPIDURAL; INFILTRATION; INTRACAUDAL; PERINEURAL at 12:05

## 2023-09-18 RX ADMIN — FENTANYL CITRATE 50 MCG: 50 INJECTION, SOLUTION INTRAMUSCULAR; INTRAVENOUS at 16:23

## 2023-09-18 RX ADMIN — CEFAZOLIN 3000 MG: 10 INJECTION, POWDER, FOR SOLUTION INTRAVENOUS at 13:52

## 2023-09-18 RX ADMIN — DEXMEDETOMIDINE HYDROCHLORIDE 4 MCG: 100 INJECTION, SOLUTION INTRAVENOUS at 14:32

## 2023-09-18 RX ADMIN — Medication 100 MCG: at 14:07

## 2023-09-18 RX ADMIN — OXYCODONE HYDROCHLORIDE 5 MG: 5 TABLET ORAL at 17:01

## 2023-09-18 RX ADMIN — Medication 1000 MG: at 17:00

## 2023-09-18 RX ADMIN — FENTANYL CITRATE 50 MCG: 50 INJECTION, SOLUTION INTRAMUSCULAR; INTRAVENOUS at 13:52

## 2023-09-18 RX ADMIN — ACETAMINOPHEN 1000 MG: 500 TABLET ORAL at 17:00

## 2023-09-18 RX ADMIN — PROPOFOL 300 MG: 10 INJECTION, EMULSION INTRAVENOUS at 13:52

## 2023-09-18 RX ADMIN — ONDANSETRON 4 MG: 2 INJECTION INTRAMUSCULAR; INTRAVENOUS at 15:08

## 2023-09-18 RX ADMIN — ROCURONIUM BROMIDE 20 MG: 10 SOLUTION INTRAVENOUS at 14:59

## 2023-09-18 RX ADMIN — FAMOTIDINE 20 MG: 20 TABLET ORAL at 12:20

## 2023-09-18 RX ADMIN — FENTANYL CITRATE 25 MCG: 50 INJECTION, SOLUTION INTRAMUSCULAR; INTRAVENOUS at 14:14

## 2023-09-18 RX ADMIN — BUPIVACAINE HYDROCHLORIDE 30 ML: 2.5 INJECTION, SOLUTION EPIDURAL; INFILTRATION; INTRACAUDAL; PERINEURAL at 13:13

## 2023-09-18 RX ADMIN — ONDANSETRON 4 MG: 2 INJECTION INTRAMUSCULAR; INTRAVENOUS at 17:04

## 2023-09-18 RX ADMIN — Medication 200 MCG: at 14:09

## 2023-09-18 RX ADMIN — FENTANYL CITRATE 50 MCG: 50 INJECTION, SOLUTION INTRAMUSCULAR; INTRAVENOUS at 16:06

## 2023-09-18 RX ADMIN — BUPIVACAINE HYDROCHLORIDE 20 ML: 2.5 INJECTION, SOLUTION EPIDURAL; INFILTRATION; INTRACAUDAL at 16:03

## 2023-09-18 RX ADMIN — DEXMEDETOMIDINE HYDROCHLORIDE 4 MCG: 100 INJECTION, SOLUTION INTRAVENOUS at 15:08

## 2023-09-18 RX ADMIN — DEXMEDETOMIDINE HYDROCHLORIDE 4 MCG: 100 INJECTION, SOLUTION INTRAVENOUS at 15:23

## 2023-09-18 RX ADMIN — ROCURONIUM BROMIDE 20 MG: 10 SOLUTION INTRAVENOUS at 14:32

## 2023-09-18 RX ADMIN — HYDROMORPHONE HYDROCHLORIDE 0.5 MG: 1 INJECTION, SOLUTION INTRAMUSCULAR; INTRAVENOUS; SUBCUTANEOUS at 16:35

## 2023-09-18 RX ADMIN — ROCURONIUM BROMIDE 25 MG: 10 SOLUTION INTRAVENOUS at 14:00

## 2023-09-18 RX ADMIN — SUGAMMADEX 200 MG: 100 INJECTION, SOLUTION INTRAVENOUS at 15:11

## 2023-09-18 RX ADMIN — HYDROMORPHONE HYDROCHLORIDE 0.5 MG: 1 INJECTION, SOLUTION INTRAMUSCULAR; INTRAVENOUS; SUBCUTANEOUS at 16:16

## 2023-09-18 RX ADMIN — DEXMEDETOMIDINE HYDROCHLORIDE 4 MCG: 100 INJECTION, SOLUTION INTRAVENOUS at 14:21

## 2023-09-18 RX ADMIN — LIDOCAINE HYDROCHLORIDE 50 MG: 10 INJECTION, SOLUTION EPIDURAL; INFILTRATION; INTRACAUDAL; PERINEURAL at 13:52

## 2023-09-18 RX ADMIN — Medication 100 MCG: at 14:03

## 2023-09-18 RX ADMIN — Medication 1000 MG: at 16:03

## 2023-09-18 RX ADMIN — ROCURONIUM BROMIDE 5 MG: 10 SOLUTION INTRAVENOUS at 13:52

## 2023-09-18 RX ADMIN — EPHEDRINE SULFATE 5 MG: 50 INJECTION INTRAVENOUS at 14:07

## 2023-09-18 RX ADMIN — FENTANYL CITRATE 25 MCG: 50 INJECTION, SOLUTION INTRAMUSCULAR; INTRAVENOUS at 14:32

## 2023-09-18 RX ADMIN — FENTANYL CITRATE 100 MCG: 50 INJECTION, SOLUTION INTRAMUSCULAR; INTRAVENOUS at 13:04

## 2023-09-18 RX ADMIN — Medication 200 MG: at 13:52

## 2023-09-18 RX ADMIN — EPHEDRINE SULFATE 5 MG: 50 INJECTION INTRAVENOUS at 14:08

## 2023-09-18 RX ADMIN — DEXAMETHASONE SODIUM PHOSPHATE 4 MG: 10 INJECTION, SOLUTION INTRAMUSCULAR; INTRAVENOUS at 13:57

## 2023-09-18 RX ADMIN — DEXMEDETOMIDINE HYDROCHLORIDE 4 MCG: 100 INJECTION, SOLUTION INTRAVENOUS at 14:47

## 2023-09-18 RX ADMIN — SODIUM CHLORIDE, POTASSIUM CHLORIDE, SODIUM LACTATE AND CALCIUM CHLORIDE 9 ML/HR: 600; 310; 30; 20 INJECTION, SOLUTION INTRAVENOUS at 12:05

## 2023-09-18 NOTE — OP NOTE
KNEE ANTERIOR CRUCIATE LIGAMENT RECONSTRUCTION WITH MENISCAL REPAIR  Procedure Report    Patient Name:  Dawit Schmitz  YOB: 1990    Date of Surgery:  9/18/2023     Indications: 33-year-old presenting with right knee ACL tear.  Treatment options were discussed at length including operative versus nonoperative treatment.  Risks and benefits of surgery were discussed including but not limited to bleeding, infection, injury to surrounding structures such as blood vessels tendons and nerves. We discussed the possibility of surgical procedure failure, retear of the ACL, persistent instability, persistent pain, loss of motion, persistent stiffness, persistent weakness, and the need for a revision surgery. In addition, we discussed the risk of heart attack, stroke, or death.  Patient understands these as well as alternative treatment options and does elect to proceed.    Pre-op Diagnosis:     Right knee ACL tear, possible medial meniscus tear    Post-op Diagnosis:       Right knee ACL tear    Procedure/CPT® Codes:      Procedure(s):  KNEE ARTHROSCOPY WITH ANTERIOR CRUCIATE LIGAMENT RECONSTRUCTION WITH QUAD TENDON AUTOGRAFT- RIGHT    Staff:  Surgeon(s):  Sajan Baldwin MD    Circulator: Cathy Rosenthal RN; Rama Zendejas RN  Scrub Person: Ml Nix; Juanito Magana; Db Browne  Vendor Representative: Hieu Hogan; Kiran Spivey  Nursing Assistant: Kaya Harper PCT  Assistant: Navin Boyd PA-C     Assistant: Navin Boyd PA-C  Indication for surgical assistant:  Surgical assistant was indicated for assistance with patient positioning as well as critical portions of the procedure including graft harvest, graft preparation, camera holding, placement of implants/hardware, and closure of wounds.    Anesthesia: General with Block    Estimated Blood Loss:  25 cc    Implants:    Implant Name Type Inv. Item Serial No.  Lot No. LRB No. Used Action   SUT FW #2 W/TPR  NDL 1/2 CIR 38IN 97CM 26.5MM ZAK - [id number] Implant SUT FW #2 W/TPR NDL 1/2 CIR 38IN 97CM 26.5MM ZAK  [employer]  Right 4 Implanted   SCRW INTERFER FASTTHREAD PEEK 9X20MM - [id number] Implant SCRW INTERFER FASTTHREAD PEEK 9X20MM  [employer] [id number] Right 1 Implanted   SCRW INTERFER FASTTHREAD PEEK NOVNT [id number] - [id number] Implant SCRW INTERFER FASTTHREAD PEEK NOVNT [id number]  [employer] [id number] Right 1 Implanted       Specimen:                None      Complications: None apparent    Description of Procedure:   Patient was identified in the preoperative holding area and the right knee was marked.  Patient was transported to the OR and place supine on the operative table.  General anesthesia was induced.  Examination under anesthesia showed full range of motion of the knee.  There was a 2B Lachman exam and positive pivot shift.  Remainder of the ligament exam was within normal limits.  The right knee was next prepped and draped in the usual sterile fashion.  Preoperative time out was performed indicating correct patient, correct side, correct procedure, and that preoperative antibiotic had been given.  Next an Esmarch was used to exsanguinate the extremity and the tourniquet was inflated to 300 mmHg.  Next a midline incision was made centered over the quadricep tendon and superior pole of the patella.  This was taken down through the subcutaneous tissues.  The central one third of the quadricep tendon was identified and was harvested utilizing a 10 mm double scalpel followed by a saw to obtain 10 x20 mmbone block from the patellar side.  This was harvested approximately for a total length of approximately 100 mm of the graft.  The graft was taken to the back table where it was prepared in the usual fashion.  A vancomycin solution on a gauze sponge was then wrapped around the prepared graft until it was needed later in the case.  Next the tendon was repaired with 0 Vicryl.      Next anteromedial and anterolateral portals  were created and the scope was introduced into the knee.  The ACL was visualized in the notch and noted to be torn.  A shaver was used to perform a debridement of the torn ACL and a combination of shaver and bur were used to perform a notchplasty of the lateral femoral condyle to allow adequate room for drilling of femoral tunnel and graft passage later in the case.  Next the scope was driven into the medial compartment where there was noted to be no evidence of meniscal tearing.  The cartilage surfaces were noted to be intact.  The scope was inserted into the posterior medial compartment where there was no evidence of meniscus capsular junction tearing.  Scope was then driven into the lateral compartment where there was no evidence of cartilage injury or lateral meniscal tearing.  The scope was then moved to the patellofemoral joint where there was no evidence of cartilage injury in the patellofemoral joint.  No loose bodies were noted in the suprapatellar pouch or in the medial or lateral gutters.    I next turned my attention back to the ACL reconstruction.  Using the ACL tibial guide a guide pin was placed from the anteromedial tibia into the ACL footprint.  This was then overdrilled with a 10 mm reamer.  An arthroscopic shaver was used to debride any loose debris and to smooth out the edges of the tunnel.  Next utilizing anterior inferior medial portal drilling technique the femoral tunnel was drilled to a length of approximately 20 mm.  A guide pin was then drilled up through the tunnel and out the lateral cortex of the femur.  A free suture was pulled through with this.  The looped end of the suture was retrieved through the tibial tunnel and this was then used to pass the ACL graft up the tibial tunnel and into the femoral tunnel.  The graft was seated into the femoral tunnel and secured into place with an interference screw.  The graft was cycled multiple times with tension distally.  The knee was then  brought to full extension and a posterior drawer was placed onto the knee.  With traction on the distal aspect of the graft an interference  screw was placed for fixation in the tibial tunnel.  Once this was complete examination of the knee showed restoration of a 1A Lachman and negative pivot shift.  The scope was placed back into the knee and the graft was noted to be in good position with no impingement of the graft on the notch with the knee in full extension.  The knee was then drained of excess fluid.  The deep layers were closed using 0 Vicryl followed by 2-0 Vicryl in the subcutaneous tissues and a running 3-0 Monocryl in the subcuticular layer.  The leg was then washed and dressed and the patient was placed into a postoperative knee brace locked in extension.  They were then awoken and taken to PACU in stable condition.     Disposition:  Patient will be weightbearing as tolerated with a brace locked in extension at all times with ambulation.  Physical therapy should start as soon as possible.  I will plan on seeing her back in 10-14 days for first postoperative check.    Sajan Baldwin MD     Date: 9/18/2023  Time: 15:26 EDT

## 2023-09-18 NOTE — H&P
Pre-Op H&P  Dawit Schmitz  [id number]  1990      Chief complaint: Right knee pain      Subjective:  Patient is a 33 y.o.male presents for scheduled surgery by Dr. Baldwin. He anticipates a KNEE ARTHROSCOPY WITH ANTERIOR CRUCIATE LIGAMENT RECONSTRUCTION WITH QUAD TENDON AUTOGRAFT AND MEDIAL MENISCUS REPAIR - RIGHT  today. He injured his right knee on 7/13/23 while wrestling. He states he tore the ACL. He denies much pain, but feels instability. No use of bracing or assistive device for ambulation.       Review of Systems:  Constitutional-- No fever, chills or sweats. No fatigue.  CV-- No chest pain, palpitation or syncope. +HTN  Resp-- No cough, hemoptysis. +SOB  Skin--No rashes or lesions      Allergies:   Allergies   Allergen Reactions    Prozac [Fluoxetine Hcl] Anxiety     Worsened anxiety.          Home Meds:  Medications Prior to Admission   Medication Sig Dispense Refill Last Dose    amLODIPine (NORVASC) 5 MG tablet Take 1 tablet by mouth Daily. 90 tablet 1 9/17/2023 at 1100    B Complex Vitamins (VITAMIN B COMPLEX PO) Take 1 tablet by mouth Daily.   9/14/2023    hydrOXYzine (ATARAX) 50 MG tablet Take 1 tablet by mouth Every 8 (Eight) Hours As Needed for Anxiety. 90 tablet 6 9/17/2023 at 1100    levothyroxine (SYNTHROID, LEVOTHROID) 75 MCG tablet Take 1 tablet by mouth Daily. 90 tablet 3 9/17/2023 at 1100    lisinopril (PRINIVIL,ZESTRIL) 40 MG tablet Take 1 tablet by mouth Daily. 90 tablet 1 9/17/2023 at 1100    naproxen (NAPROSYN) 500 MG tablet TAKE 1 TABLET BY MOUTH 2 TIMES A DAY AS NEEDED FOR MODERATE PAIN (Patient taking differently: Take 1 tablet by mouth As Needed for Mild Pain.) 60 tablet 0 9/11/2023    NON FORMULARY Take 2 tablets by mouth Daily. VIM (testosterone booster) helps with testosterone absorption   9/17/2023 at 1100    aspirin (Adult Aspirin Regimen) 81 MG EC tablet Take 1 tablet by mouth Daily. 42 tablet 0 9/8/2023    cyclobenzaprine (FLEXERIL) 10 MG tablet Take 1 tablet by  "mouth 3 (Three) Times a Day As Needed for Muscle Spasms. 60 tablet 1 More than a month    NON FORMULARY 1 (One) Time As Needed. Testosterone pellet therapy   More than a month    NON FORMULARY Take 1 tablet by mouth Daily. Vitamin A, D, K   More than a month    ondansetron (ZOFRAN) 4 MG tablet Take 1 tablet by mouth Every 6 (Six) Hours As Needed for nausea. 30 tablet 0 Unknown    oxyCODONE (ROXICODONE) 5 MG immediate release tablet Take 1-2 tablets by mouth Every 4 (Four) to 6 (Six) Hours As Needed for post-op pain. 36 tablet 0 Unknown         PMH:   Past Medical History:   Diagnosis Date    Anxiety     Arthritis of left ankle     Depression     Essential hypertension 2021    Fracture, foot     growth plate in right heel    Gastroesophageal reflux disease 2017    Hypothyroidism     Rotator cuff syndrome     right    Testosterone insufficiency      PSH:    Past Surgical History:   Procedure Laterality Date    NO PAST SURGERIES         Immunization History:  Influenza: No  Pneumococcal: No  Tetanus: UTD  Covid : x4    Social History:   Tobacco:   Social History     Tobacco Use   Smoking Status Former    Packs/day: 0.50    Years: 9.00    Pack years: 4.50    Types: Cigarettes    Start date: 2010    Quit date: 2019    Years since quittin.7   Smokeless Tobacco Former    Types: Snuff, Chew    Quit date: 2018      Alcohol:     Social History     Substance and Sexual Activity   Alcohol Use No         Physical Exam:/91 (BP Location: Right arm, Patient Position: Lying)   Pulse 80   Temp 98 °F (36.7 °C) (Temporal)   Resp 16   Ht 186.7 cm (73.5\")   Wt (!) 175 kg (386 lb)   SpO2 98%   BMI 50.24 kg/m²       General Appearance:    Alert, cooperative, no distress, appears stated age   Head:    Normocephalic, without obvious abnormality, atraumatic   Lungs:     Clear to auscultation bilaterally, respirations unlabored    Heart:   Regular rate and rhythm, S1 and S2 normal    Abdomen:    Soft " without tenderness   Extremities:   Extremities normal, atraumatic, no cyanosis or edema   Skin:   Skin color, texture, turgor normal, no rashes or lesions   Neurologic:   Grossly intact     Results Review:     LABS:  Lab Results   Component Value Date    WBC 7.73 09/12/2023    HGB 15.3 09/12/2023    HCT 45.8 09/12/2023    MCV 92.7 09/12/2023     09/12/2023    NEUTROABS 5.13 11/20/2018    GLUCOSE 96 09/12/2023    BUN 15 09/12/2023    CREATININE 1.23 09/12/2023    EGFRIFNONA 82 06/15/2021    EGFRIFAFRI 108 11/20/2018     09/12/2023    K 4.6 09/12/2023     09/12/2023    CO2 25.0 09/12/2023    CALCIUM 10.0 09/12/2023    ALBUMIN 4.2 09/12/2023    AST 27 09/12/2023    ALT 28 09/12/2023    BILITOT <0.2 09/12/2023       RADIOLOGY:  Imaging Results (Last 72 Hours)       ** No results found for the last 72 hours. **            I reviewed the patient's new clinical results.    Cancer Staging (if applicable)  Cancer Patient: __ yes __no __unknown; If yes, clinical stage T:__ N:__M:__, stage group or __N/A      Impression: Right knee pain      Plan: KNEE ARTHROSCOPY WITH ANTERIOR CRUCIATE LIGAMENT RECONSTRUCTION WITH QUAD TENDON AUTOGRAFT AND MEDIAL MENISCUS REPAIR - RIGHT       NORBERTO Membreno   9/18/2023   12:27 EDT

## 2023-09-18 NOTE — DISCHARGE INSTRUCTIONS
InfuBLOCK - Patient Information    What is a pain pump?  The InfuBLOCK pump delivers post-operative, non-narcotic, numbing medication to the nerve near the surgical site for pain relief.     Where can I find information about my pain pump?           For more information about your pain pump, scan the QR code.  For additional patient resources, visit [email or web address].                                                                                               While your physician is your primary source for information about your treatment there may be times during your treatment that you need assistance with your infusion pump.     If you need assistance take the following steps:    The [facility] Hotline is Here for You 24/7.  Please call [phone] for the following concerns or complications:    Answers to questions about your infusion pump                 Tubing disconnect  Assistance with pump alarms                                                      Dislodged catheter  Excessive leakage noted from pump                                         Inadequate pain control    2.   [facility] Acute Pain [facility]: [phone] is available 24/7 for any further needs or concerns about medication or pain control. Nerve Catheter Removal Instructions  When your device is empty:    Remove your catheter by pulling the dressing off slowly (like you would remove a regular bandage). The catheter should pull right out of the skin.  Check that the BLUE tip is intact.                                                                                     If the catheter is stuck, reposition your   extremity and pull slowly until removed.  *If catheter is HURTING and WON'T come out, stop and call [phone] for further assistance.    Remove medication bag from the black carrying case.  Cut the tubing on right and left side of pump, and discard the medication bag and tubing into  garbage.  Place the pump and black carrying case into the plastic bag and then place this into the return box.  Seal box with blue stickers and return to [place] postal service. THIS IS PRE-PAID POSTAGE.

## 2023-09-18 NOTE — ANESTHESIA POSTPROCEDURE EVALUATION
Patient: Dawit Schmitz    Procedure Summary       Date: 09/18/23 Room / Location:  [facility]  /  [facility]    Anesthesia Start: 1342 Anesthesia Stop: 1547    Procedure: KNEE ARTHROSCOPY WITH ANTERIOR CRUCIATE LIGAMENT RECONSTRUCTION WITH QUAD TENDON AUTOGRAFT- RIGHT (Right: Knee) Diagnosis:     Surgeons: Sajan Baldwin MD Provider: Jacobo Hadley MD    Anesthesia Type: general with block ASA Status: 3            Anesthesia Type: general with block    Vitals  Vitals Value Taken Time   /99 09/18/23 1700   Temp 97.7 °F (36.5 °C) 09/18/23 1700   Pulse 96 09/18/23 1700   Resp 15 09/18/23 1700   SpO2 96 % 09/18/23 1700         Post Anesthesia Care and Evaluation    Patient location during evaluation: PACU  Patient participation: complete - patient participated  Level of consciousness: awake and alert  Pain management: adequate    Airway patency: patent  Anesthetic complications: No anesthetic complications  PONV Status: none  Cardiovascular status: hemodynamically stable and acceptable  Respiratory status: nonlabored ventilation, acceptable and nasal cannula  Hydration status: acceptable

## 2023-09-18 NOTE — ANESTHESIA PROCEDURE NOTES
Airway  Urgency: elective    Date/Time: 9/18/2023 1:48 PM  Airway not difficult    General Information and Staff    Patient location during procedure: OR  SRNA: Pamela Murphy SRNA  Indications and Patient Condition  Indications for airway management: airway protection    Preoxygenated: yes  MILS not maintained throughout  Mask difficulty assessment: 2 - vent by mask + OA or adjuvant +/- NMBA    Final Airway Details  Final airway type: endotracheal airway      Successful airway: ETT  Cuffed: yes   Successful intubation technique: video laryngoscopy  Endotracheal tube insertion site: oral  Blade: Sutton  Blade size: 4  ETT size (mm): 7.5  Cormack-Lehane Classification: grade IIa - partial view of glottis  Placement verified by: chest auscultation and capnometry   Measured from: lips  ETT/EBT  to lips (cm): 24  Number of attempts at approach: 1  Assessment: lips, teeth, and gum same as pre-op and atraumatic intubation    Additional Comments  Negative epigastric sounds, Breath sound equal bilaterally with symmetric chest rise and fall

## 2023-09-18 NOTE — ANESTHESIA PREPROCEDURE EVALUATION
Anesthesia Evaluation     Patient summary reviewed and Nursing notes reviewed   no history of anesthetic complications:   NPO Solid Status: > 8 hours  NPO Liquid Status: > 2 hours           Airway   Mallampati: II  TM distance: >3 FB  Neck ROM: full  No difficulty expected  Comment: Full beard  Dental - normal exam     Pulmonary - normal exam   (+) a smoker Current,  Cardiovascular - normal exam    ECG reviewed    (+) hypertension  (-) dysrhythmias, SALAS, cardiac stents      Neuro/Psych  (+) psychiatric history Anxiety, Depression and ADHD  (-) seizures, TIA, CVA  GI/Hepatic/Renal/Endo    (+) morbid obesity, GERD, thyroid problem hypothyroidism  (-) liver disease, no renal disease, diabetes    Musculoskeletal     Abdominal    Substance History      OB/GYN          Other   arthritis,                   Anesthesia Plan    ASA 3     general with block     (General possible block will discuss SS vs catheter per [place])  intravenous induction     Anesthetic plan, risks, benefits, and alternatives have been provided, discussed and informed consent has been obtained with: patient.    Plan discussed with CRNA.    CODE STATUS:

## 2023-09-18 NOTE — ANESTHESIA PROCEDURE NOTES
SS Adductor      Patient reassessed immediately prior to procedure    Reason for block: at surgeon's request and post-op pain management  Performed by  CRNA/CAA: Rick Still, CRNA  Assisted by: Shara Horn RN  Preanesthetic Checklist  Completed: patient identified, IV checked, site marked, risks and benefits discussed, surgical consent, monitors and equipment checked, pre-op evaluation and timeout performed  Prep:  Pt Position: supine  Sterile barriers:cap, gloves, mask, sterile barriers and washed/disinfected hands  Prep: ChloraPrep  Patient monitoring: blood pressure monitoring, continuous pulse oximetry and EKG  Procedure  Performed under: spinal  Guidance:ultrasound guided    ULTRASOUND INTERPRETATION.  Using ultrasound guidance a 20 G gauge needle was placed in close proximity to the nerve, at which point, under ultrasound guidance anesthetic was injected in the area of the nerve and spread of the anesthesia was seen on ultrasound in close proximity thereto.  There were no abnormalities seen on ultrasound; a digital image was taken; and the patient tolerated the procedure with no complications. Images:still images obtained, printed/placed on chart    Laterality:right  Block Type:adductor canal block  Injection Technique:single-shot  Needle Type:Tuohy and echogenic  Needle Gauge:18 G  Resistance on Injection: none  Catheter Size:20 G (20g)    Medications Used: bupivacaine PF (MARCAINE) 0.25 % injection - Injection   30 mL - 9/18/2023 1:13:00 PM  fentaNYL citrate (PF) (SUBLIMAZE) injection - Intravenous   100 mcg - 9/18/2023 1:04:00 PM      Medications  Preservative Free Saline:5ml    Post Assessment  Injection Assessment: negative aspiration for heme, incremental injection and no paresthesia on injection  Patient Tolerance:comfortable throughout block  Complications:no  Additional Notes  SINGLE shot   A high-frequency linear transducer, with sterile cover, was placed on the anterior mid-thigh (between  "the anterior superior iliac spine and patella). The transducer was then moved medially to identify the Sartorius muscle (Cruz), Vastus Medialis muscle (VMM), Superficial Femoral Artery (SFA) and Vein. The transducer was then moved cephalad or caudad to position the SFA in the middle of the Cruz. The insertion site was prepped and draped in sterile fashion. Skin and cutaneous tissue was infiltrated with 2-5 ml of 1% Lidocaine. Using ultrasound-guidance, a 20-gauge B-Cuello 4\" Ultraplex 360 non-stimulating echogenic needle was advanced in plane from lateral to medial. Preservative-free normal saline was utilized for hydro-dissection of tissue, advancement of needle, and to confirm needle placement below the fascial plane of the Cruz where the Nerve to the VMM is located. Local anesthetic (LA) 5 ml deposited here. The needle continues its path lateral to the SFA at the level of the Saphenous Nerve. The remainder of the LA was deposited at the 10-11 o'clock position of the SFA. This injection created a space between the Cruz and the SFA. Aspiration every 5 ml to prevent intravascular injection. Injection was completed with negative aspiration of blood and negative intravascular injection. Injection pressures were normal with minimal resistance.           "

## 2023-09-18 NOTE — ANESTHESIA PROCEDURE NOTES
Aductor Canal Catheter      Patient reassessed immediately prior to procedure    Reason for block: at surgeon's request and post-op pain management  Performed by  CRNA/CAA: Rick Still CRNA  Assisted by: Janak Lea CRNA  Preanesthetic Checklist  Completed: patient identified, IV checked, site marked, risks and benefits discussed, surgical consent, monitors and equipment checked, pre-op evaluation and timeout performed  Prep:  Pt Position: supine  Sterile barriers:cap, gloves, mask, sterile barriers and washed/disinfected hands  Prep: ChloraPrep  Patient monitoring: blood pressure monitoring, continuous pulse oximetry and EKG  Procedure  Performed under: spinal  Guidance:ultrasound guided    ULTRASOUND INTERPRETATION.  Using ultrasound guidance a 20 G gauge needle was placed in close proximity to the nerve, at which point, under ultrasound guidance anesthetic was injected in the area of the nerve and spread of the anesthesia was seen on ultrasound in close proximity thereto.  There were no abnormalities seen on ultrasound; a digital image was taken; and the patient tolerated the procedure with no complications. Images:still images obtained, printed/placed on chart    Block Type:adductor canal block  Injection Technique:catheter  Needle Type:Tuohy and echogenic  Needle Gauge:18 G  Resistance on Injection: none  Catheter Size:20 G (20g)  Cath Depth at skin: 11 cm    Medications Used: bupivacaine PF (MARCAINE) 0.25 % injection - Injection   20 mL - 9/18/2023 4:03:00 PM      Medications  Preservative Free Saline:10ml    Post Assessment  Injection Assessment: negative aspiration for heme, incremental injection and no paresthesia on injection  Patient Tolerance:comfortable throughout block  Complications:no  Additional Notes  CATHETER   A high-frequency linear transducer, with sterile cover, was placed on the anterior mid-thigh (between the anterior superior iliac spine and patella). The transducer was then  "moved medially to identify the Sartorius muscle (Cruz), Vastus Medialis muscle (VMM), Superficial Femoral Artery (SFA) and Vein. The transducer was then moved cephalad or caudad to position the SFA in the middle of the Cruz. The insertion site was prepped and draped in sterile fashion. Skin and cutaneous tissue was infiltrated with 2-5 ml of 1% Lidocaine. Using ultrasound-guidance, an 18-gauge Dropletiplex Ultra 360 Touhy needle was advanced in plane from lateral to medial. Preservative-free normal saline was utilized for hydro-dissection of tissue, advancement of Touhy, and to confirm needle placement below the fascial plane of the Cruz where the Nerve to the VMM is located. Local anesthetic (LA) 5 ml deposited here. The Touhy needle continues its path lateral to the SFA at the level of the Saphenous Nerve. The remainder of the LA was deposited at the 10-11 o'clock position of the SFA. This injection created a space between the Cruz and the SFA. Aspiration every 5 ml to prevent intravascular injection. Injection was completed with negative aspiration of blood and negative intravascular injection. Injection pressures were normal with minimal resistance. A 20-gauge Dropletiplex Echo catheter was placed through the needle and advance out the tip of the Touhy 3-5 cm anterior to the SFA. The Touhy needle was then removed, and final catheter position verified at the 12 o'clock position to the SFA. The catheter was secured in the usual fashion with skin glue, benzoin, steri-strips, CHG tegaderm and label noting \"Nerve Block Catheter\". Jerk tape applied at yellow connector and catheter connection.           "

## 2023-09-20 ENCOUNTER — TELEPHONE (OUTPATIENT)
Dept: INTERNAL MEDICINE | Facility: CLINIC | Age: 33
End: 2023-09-20
Payer: COMMERCIAL

## 2023-09-20 NOTE — TELEPHONE ENCOUNTER
Your labs are in acceptable ranges/are stable. Please continue your current treatment plan and/or medications.   Written by NORBERTO Fontanez on 9/17/2023  8:58 AM EDT

## 2023-10-02 DIAGNOSIS — F41.9 ANXIETY: ICD-10-CM

## 2023-10-03 RX ORDER — HYDROXYZINE 50 MG/1
TABLET, FILM COATED ORAL
Qty: 90 TABLET | Refills: 2 | Status: SHIPPED | OUTPATIENT
Start: 2023-10-03

## 2023-11-13 ENCOUNTER — OFFICE VISIT (OUTPATIENT)
Dept: INTERNAL MEDICINE | Facility: CLINIC | Age: 33
End: 2023-11-13
Payer: COMMERCIAL

## 2023-11-13 VITALS
HEIGHT: 74 IN | SYSTOLIC BLOOD PRESSURE: 130 MMHG | WEIGHT: 315 LBS | TEMPERATURE: 97.5 F | DIASTOLIC BLOOD PRESSURE: 90 MMHG | OXYGEN SATURATION: 98 % | BODY MASS INDEX: 40.43 KG/M2 | HEART RATE: 84 BPM

## 2023-11-13 DIAGNOSIS — J06.9 UPPER RESPIRATORY TRACT INFECTION, UNSPECIFIED TYPE: ICD-10-CM

## 2023-11-13 DIAGNOSIS — R05.9 COUGH, UNSPECIFIED TYPE: Primary | ICD-10-CM

## 2023-11-13 LAB
EXPIRATION DATE: NORMAL
FLUAV AG UPPER RESP QL IA.RAPID: NOT DETECTED
FLUBV AG UPPER RESP QL IA.RAPID: NOT DETECTED
INTERNAL CONTROL: NORMAL
Lab: NORMAL
SARS-COV-2 AG UPPER RESP QL IA.RAPID: NOT DETECTED

## 2023-11-13 RX ORDER — AZITHROMYCIN 250 MG/1
TABLET, FILM COATED ORAL
Qty: 6 TABLET | Refills: 0 | Status: SHIPPED | OUTPATIENT
Start: 2023-11-13

## 2023-11-13 NOTE — PROGRESS NOTES
"Subjective   Dawit Schmitz is a 33 y.o. male.     Chief Complaint   Patient presents with    Cough    URI     Cough and congestion since 11/3       Visit Vitals  /90 (BP Location: Right arm, Patient Position: Sitting, Cuff Size: Large Adult)   Pulse 84   Temp 97.5 °F (36.4 °C)   Ht 186.7 cm (73.5\")   Wt (!) 173 kg (381 lb)   SpO2 98%   BMI 49.59 kg/m²       Wt Readings from Last 3 Encounters:   11/13/23 (!) 173 kg (381 lb)   09/18/23 (!) 175 kg (386 lb)   09/12/23 (!) 175 kg (386 lb 9.6 oz)       Cough  This is a new problem. The current episode started 1 to 4 weeks ago. The problem has been waxing and waning. The problem occurs every few hours. The cough is Productive of sputum. Associated symptoms include weight loss. Pertinent negatives include no chest pain, chills, ear congestion, ear pain, fever, headaches, heartburn, hemoptysis, myalgias, nasal congestion, postnasal drip, rash, rhinorrhea, sore throat, shortness of breath, sweats or wheezing. Nothing aggravates the symptoms. Treatments tried: decongestant. The treatment provided moderate relief. His past medical history is significant for environmental allergies. There is no history of asthma, bronchiectasis, bronchitis, COPD, emphysema or pneumonia.   URI   This is a new problem. The current episode started 1 to 4 weeks ago. The problem has been unchanged. There has been no fever. Associated symptoms include congestion and coughing. Pertinent negatives include no abdominal pain, chest pain, diarrhea, dysuria, ear pain, headaches, joint pain, joint swelling, nausea, neck pain, plugged ear sensation, rash, rhinorrhea, sinus pain, sneezing, sore throat, swollen glands, vomiting or wheezing. He has tried decongestant for the symptoms. The treatment provided moderate relief.      Pt started getting ill on 11/3/23 and got a little worse last week.  Pt taking decongestants. Pt denied fever.     The following portions of the patient's history were " reviewed and updated as appropriate: allergies, current medications, past family history, past medical history, past social history, past surgical history, and problem list.    Past Medical History:   Diagnosis Date    Anxiety     Arthritis of left ankle     Depression     Essential hypertension 2021    Fracture, foot     growth plate in right heel    Gastroesophageal reflux disease 2017    Hypothyroidism     Rotator cuff syndrome     right    Testosterone insufficiency       Past Surgical History:   Procedure Laterality Date    KNEE ACL RECONSTRUCTION Right 2023    Procedure: KNEE ARTHROSCOPY WITH ANTERIOR CRUCIATE LIGAMENT RECONSTRUCTION WITH QUAD TENDON AUTOGRAFT- RIGHT;  Surgeon: Sajan Baldwin MD;  Location: [facility];  Service: Orthopedics;  Laterality: Right;    NO PAST SURGERIES        Family History   Problem Relation Age of Onset    Diabetes Paternal Uncle     Lung cancer Maternal Grandmother     Hypertension Mother     Thyroid disease Mother       Social History     Socioeconomic History    Marital status:    Tobacco Use    Smoking status: Former     Packs/day: 0.50     Years: 9.00     Additional pack years: 0.00     Total pack years: 4.50     Types: Cigarettes     Start date: 2010     Quit date: 2019     Years since quittin.8    Smokeless tobacco: Former     Types: Snuff, Chew     Quit date: 2018   Vaping Use    Vaping Use: Some days    Substances: THC    Devices: Pre-filled or refillable cartridge   Substance and Sexual Activity    Alcohol use: No    Drug use: Yes     Frequency: 42.0 times per week     Types: Marijuana     Comment: uses six servings a day    Sexual activity: Defer      Allergies   Allergen Reactions    Prozac [Fluoxetine Hcl] Anxiety     Worsened anxiety.        Review of Systems   Constitutional:  Positive for weight loss. Negative for chills and fever.   HENT:  Positive for congestion. Negative for ear pain, postnasal drip, rhinorrhea, sinus  pain, sneezing and sore throat.    Respiratory:  Positive for cough. Negative for hemoptysis, shortness of breath and wheezing.    Cardiovascular:  Negative for chest pain.   Gastrointestinal:  Negative for abdominal pain, diarrhea, heartburn, nausea and vomiting.   Genitourinary:  Negative for dysuria.   Musculoskeletal:  Negative for joint pain, myalgias and neck pain.   Skin:  Negative for rash.   Allergic/Immunologic: Positive for environmental allergies.   Neurological:  Negative for headaches.       Objective   Physical Exam    Assessment & Plan   Problems Addressed this Visit    None  Visit Diagnoses       Cough, unspecified type    -  Primary    Relevant Medications    azithromycin (Zithromax) 250 MG tablet    Other Relevant Orders    POCT SARS-CoV-2 Antigen RENNY + Flu (Completed)    Upper respiratory tract infection, unspecified type        Relevant Medications    azithromycin (Zithromax) 250 MG tablet          Diagnoses         Codes Comments    Cough, unspecified type    -  Primary ICD-10-CM: R05.9  ICD-9-CM: 786.2     Upper respiratory tract infection, unspecified type     ICD-10-CM: J06.9  ICD-9-CM: 465.9             Get flu and Covid when feeling better           Current Outpatient Medications:     amLODIPine (NORVASC) 5 MG tablet, Take 1 tablet by mouth Daily., Disp: 90 tablet, Rfl: 1    B Complex Vitamins (VITAMIN B COMPLEX PO), Take 1 tablet by mouth Daily., Disp: , Rfl:     hydrOXYzine (ATARAX) 50 MG tablet, TAKE 1 TABLET BY MOUTH EVERY EIGHT HOURS AS NEEDED FOR ANXIETY, Disp: 90 tablet, Rfl: 2    levothyroxine (SYNTHROID, LEVOTHROID) 75 MCG tablet, Take 1 tablet by mouth Daily., Disp: 90 tablet, Rfl: 3    lisinopril (PRINIVIL,ZESTRIL) 40 MG tablet, Take 1 tablet by mouth Daily., Disp: 90 tablet, Rfl: 1    azithromycin (Zithromax) 250 MG tablet, Take 2 tablets to day and then one daily, Disp: 6 tablet, Rfl: 0    Return if symptoms worsen or fail to improve, for Recheck.    Recent Results (from the  past 168 hour(s))   POCT SARS-CoV-2 Antigen RENNY + Flu    Collection Time: 11/13/23 11:21 AM    Specimen: Swab   Result Value Ref Range    SARS Antigen Not Detected Not Detected, Presumptive Negative    Influenza A Antigen RENNY Not Detected Not Detected    Influenza B Antigen RENNY Not Detected Not Detected    Internal Control Passed Passed    Lot Number [id number]     Expiration Date 10/27/2024

## 2023-12-12 ENCOUNTER — OFFICE VISIT (OUTPATIENT)
Dept: INTERNAL MEDICINE | Facility: CLINIC | Age: 33
End: 2023-12-12
Payer: COMMERCIAL

## 2023-12-12 VITALS
TEMPERATURE: 97.7 F | DIASTOLIC BLOOD PRESSURE: 82 MMHG | OXYGEN SATURATION: 95 % | WEIGHT: 315 LBS | HEART RATE: 82 BPM | BODY MASS INDEX: 40.43 KG/M2 | HEIGHT: 74 IN | RESPIRATION RATE: 18 BRPM | SYSTOLIC BLOOD PRESSURE: 126 MMHG

## 2023-12-12 DIAGNOSIS — F41.9 ANXIETY: ICD-10-CM

## 2023-12-12 DIAGNOSIS — E03.9 ACQUIRED HYPOTHYROIDISM: ICD-10-CM

## 2023-12-12 DIAGNOSIS — E66.01 MORBID (SEVERE) OBESITY DUE TO EXCESS CALORIES: Primary | Chronic | ICD-10-CM

## 2023-12-12 DIAGNOSIS — I10 ESSENTIAL HYPERTENSION: ICD-10-CM

## 2023-12-12 RX ORDER — HYDROXYZINE 50 MG/1
50 TABLET, FILM COATED ORAL EVERY 8 HOURS PRN
Qty: 90 TABLET | Refills: 2 | Status: SHIPPED | OUTPATIENT
Start: 2023-12-12

## 2023-12-12 RX ORDER — AMLODIPINE BESYLATE 5 MG/1
5 TABLET ORAL DAILY
Qty: 90 TABLET | Refills: 1 | Status: SHIPPED | OUTPATIENT
Start: 2023-12-12

## 2023-12-12 RX ORDER — LISINOPRIL 40 MG/1
40 TABLET ORAL DAILY
Qty: 90 TABLET | Refills: 1 | Status: SHIPPED | OUTPATIENT
Start: 2023-12-12

## 2023-12-12 RX ORDER — LEVOTHYROXINE SODIUM 0.07 MG/1
75 TABLET ORAL DAILY
Qty: 90 TABLET | Refills: 1 | Status: SHIPPED | OUTPATIENT
Start: 2023-12-12

## 2023-12-12 NOTE — PATIENT INSTRUCTIONS
Calorie Counting for Weight Loss  Calories are units of energy. Your body needs a certain number of calories from food to keep going throughout the day. When you eat or drink more calories than your body needs, your body stores the extra calories mostly as fat. When you eat or drink fewer calories than your body needs, your body burns fat to get the energy it needs.  Calorie counting means keeping track of how many calories you eat and drink each day. Calorie counting can be helpful if you need to lose weight. If you eat fewer calories than your body needs, you should lose weight. Ask your health care provider what a healthy weight is for you.  For calorie counting to work, you will need to eat the right number of calories each day to lose a healthy amount of weight per week. A dietitian can help you figure out how many calories you need in a day and will suggest ways to reach your calorie goal.  A healthy amount of weight to lose each week is usually 1-2 lb (0.5-0.9 kg). This usually means that your daily calorie intake should be reduced by 500-750 calories.  Eating 1,200-1,500 calories a day can help most women lose weight.  Eating 1,500-1,800 calories a day can help most men lose weight.  What do I need to know about calorie counting?  Work with your health care provider or dietitian to determine how many calories you should get each day. To meet your daily calorie goal, you will need to:  Find out how many calories are in each food that you would like to eat. Try to do this before you eat.  Decide how much of the food you plan to eat.  Keep a food log. Do this by writing down what you ate and how many calories it had.  To successfully lose weight, it is important to balance calorie counting with a healthy lifestyle that includes regular activity.  Where do I find calorie information?    The number of calories in a food can be found on a Nutrition Facts label. If a food does not have a Nutrition Facts label, try  to look up the calories online or ask your dietitian for help.  Remember that calories are listed per serving. If you choose to have more than one serving of a food, you will have to multiply the calories per serving by the number of servings you plan to eat. For example, the label on a package of bread might say that a serving size is 1 slice and that there are 90 calories in a serving. If you eat 1 slice, you will have eaten 90 calories. If you eat 2 slices, you will have eaten 180 calories.  How do I keep a food log?  After each time that you eat, record the following in your food log as soon as possible:  What you ate. Be sure to include toppings, sauces, and other extras on the food.  How much you ate. This can be measured in cups, ounces, or number of items.  How many calories were in each food and drink.  The total number of calories in the food you ate.  Keep your food log near you, such as in a pocket-sized notebook or on an arjun or website on your mobile phone. Some programs will calculate calories for you and show you how many calories you have left to meet your daily goal.  What are some portion-control tips?  Know how many calories are in a serving. This will help you know how many servings you can have of a certain food.  Use a measuring cup to measure serving sizes. You could also try weighing out portions on a kitchen scale. With time, you will be able to estimate serving sizes for some foods.  Take time to put servings of different foods on your favorite plates or in your favorite bowls and cups so you know what a serving looks like.  Try not to eat straight from a food's packaging, such as from a bag or box. Eating straight from the package makes it hard to see how much you are eating and can lead to overeating. Put the amount you would like to eat in a cup or on a plate to make sure you are eating the right portion.  Use smaller plates, glasses, and bowls for smaller portions and to prevent  overeating.  Try not to multitask. For example, avoid watching TV or using your computer while eating. If it is time to eat, sit down at a table and enjoy your food. This will help you recognize when you are full. It will also help you be more mindful of what and how much you are eating.  What are tips for following this plan?  Reading food labels  Check the calorie count compared with the serving size. The serving size may be smaller than what you are used to eating.  Check the source of the calories. Try to choose foods that are high in protein, fiber, and vitamins, and low in saturated fat, trans fat, and sodium.  Shopping  Read nutrition labels while you shop. This will help you make healthy decisions about which foods to buy.  Pay attention to nutrition labels for low-fat or fat-free foods. These foods sometimes have the same number of calories or more calories than the full-fat versions. They also often have added sugar, starch, or salt to make up for flavor that was removed with the fat.  Make a grocery list of lower-calorie foods and stick to it.  Cooking  Try to cook your favorite foods in a healthier way. For example, try baking instead of frying.  Use low-fat dairy products.  Meal planning  Use more fruits and vegetables. One-half of your plate should be fruits and vegetables.  Include lean proteins, such as chicken, turkey, and fish.  Lifestyle  Each week, aim to do one of the followin minutes of moderate exercise, such as walking.  75 minutes of vigorous exercise, such as running.  General information  Know how many calories are in the foods you eat most often. This will help you calculate calorie counts faster.  Find a way of tracking calories that works for you. Get creative. Try different apps or programs if writing down calories does not work for you.  What foods should I eat?    Eat nutritious foods. It is better to have a nutritious, high-calorie food, such as an avocado, than a food with  few nutrients, such as a bag of potato chips.  Use your calories on foods and drinks that will fill you up and will not leave you hungry soon after eating.  Examples of foods that fill you up are nuts and nut butters, vegetables, lean proteins, and high-fiber foods such as whole grains. High-fiber foods are foods with more than 5 g of fiber per serving.  Pay attention to calories in drinks. Low-calorie drinks include water and unsweetened drinks.  The items listed above may not be a complete list of foods and beverages you can eat. Contact a dietitian for more information.  What foods should I limit?  Limit foods or drinks that are not good sources of vitamins, minerals, or protein or that are high in unhealthy fats. These include:  Candy.  Other sweets.  Sodas, specialty coffee drinks, alcohol, and juice.  The items listed above may not be a complete list of foods and beverages you should avoid. Contact a dietitian for more information.  How do I count calories when eating out?  Pay attention to portions. Often, portions are much larger when eating out. Try these tips to keep portions smaller:  Consider sharing a meal instead of getting your own.  If you get your own meal, eat only half of it. Before you start eating, ask for a container and put half of your meal into it.  When available, consider ordering smaller portions from the menu instead of full portions.  Pay attention to your food and drink choices. Knowing the way food is cooked and what is included with the meal can help you eat fewer calories.  If calories are listed on the menu, choose the lower-calorie options.  Choose dishes that include vegetables, fruits, whole grains, low-fat dairy products, and lean proteins.  Choose items that are boiled, broiled, grilled, or steamed. Avoid items that are buttered, battered, fried, or served with cream sauce. Items labeled as crispy are usually fried, unless stated otherwise.  Choose water, low-fat milk,  unsweetened iced tea, or other drinks without added sugar. If you want an alcoholic beverage, choose a lower-calorie option, such as a glass of wine or light beer.  Ask for dressings, sauces, and syrups on the side. These are usually high in calories, so you should limit the amount you eat.  If you want a salad, choose a garden salad and ask for grilled meats. Avoid extra toppings such as bean, cheese, or fried items. Ask for the dressing on the side, or ask for olive oil and vinegar or lemon to use as dressing.  Estimate how many servings of a food you are given. Knowing serving sizes will help you be aware of how much food you are eating at restaurants.  Where to find more information  Centers for Disease Control and Prevention: www.cdc.gov  [place] Department of Agriculture: myplate.gov  Summary  Calorie counting means keeping track of how many calories you eat and drink each day. If you eat fewer calories than your body needs, you should lose weight.  A healthy amount of weight to lose per week is usually 1-2 lb (0.5-0.9 kg). This usually means reducing your daily calorie intake by 500-750 calories.  The number of calories in a food can be found on a Nutrition Facts label. If a food does not have a Nutrition Facts label, try to look up the calories online or ask your dietitian for help.  Use smaller plates, glasses, and bowls for smaller portions and to prevent overeating.  Use your calories on foods and drinks that will fill you up and not leave you hungry shortly after a meal.  This information is not intended to replace advice given to you by your health care provider. Make sure you discuss any questions you have with your health care provider.  Document Revised: 01/28/2021 Document Reviewed: 01/28/2021  Elsevier Patient Education © 2023 [employer]  Exercising to Lose Weight  Getting regular exercise is important for everyone. It is especially important if you are overweight. Being overweight increases your  risk of heart disease, stroke, diabetes, high blood pressure, and several types of cancer. Exercising, and reducing the calories you consume, can help you lose weight and improve fitness and health.  Exercise can be moderate or vigorous intensity. To lose weight, most people need to do a certain amount of moderate or vigorous-intensity exercise each week.  How can exercise affect me?  You lose weight when you exercise enough to burn more calories than you eat. Exercise also reduces body fat and builds muscle. The more muscle you have, the more calories you burn. Exercise also:  Improves mood.  Reduces stress and tension.  Improves your overall fitness, flexibility, and endurance.  Increases bone strength.  Moderate-intensity exercise    Moderate-intensity exercise is any activity that gets you moving enough to burn at least three times more energy (calories) than if you were sitting.  Examples of moderate exercise include:  Walking a mile in 15 minutes.  Doing light yard work.  Biking at an easy pace.  Most people should get at least 150 minutes of moderate-intensity exercise a week to maintain their body weight.  Vigorous-intensity exercise  Vigorous-intensity exercise is any activity that gets you moving enough to burn at least six times more calories than if you were sitting. When you exercise at this intensity, you should be working hard enough that you are not able to carry on a conversation.  Examples of vigorous exercise include:  Running.  Playing a team sport, such as football, basketball, and soccer.  Jumping rope.  Most people should get at least 75 minutes a week of vigorous exercise to maintain their body weight.  What actions can I take to lose weight?  The amount of exercise you need to lose weight depends on:  Your age.  The type of exercise.  Any health conditions you have.  Your overall physical ability.  Talk to your health care provider about how much exercise you need and what types of  activities are safe for you.  Nutrition    Make changes to your diet as told by your health care provider or diet and nutrition specialist (dietitian). This may include:  Eating fewer calories.  Eating more protein.  Eating less unhealthy fats.  Eating a diet that includes fresh fruits and vegetables, whole grains, low-fat dairy products, and lean protein.  Avoiding foods with added fat, salt, and sugar.  Drink plenty of water while you exercise to prevent dehydration or heat stroke.  Activity  Choose an activity that you enjoy and set realistic goals. Your health care provider can help you make an exercise plan that works for you.  Exercise at a moderate or vigorous intensity most days of the week.  The intensity of exercise may vary from person to person. You can tell how intense a workout is for you by paying attention to your breathing and heartbeat. Most people will notice their breathing and heartbeat get faster with more intense exercise.  Do resistance training twice each week, such as:  Push-ups.  Sit-ups.  Lifting weights.  Using resistance bands.  Getting short amounts of exercise can be just as helpful as long, structured periods of exercise. If you have trouble finding time to exercise, try doing these things as part of your daily routine:  Get up, stretch, and walk around every 30 minutes throughout the day.  Go for a walk during your lunch break.  Park your car farther away from your destination.  If you take public transportation, get off one stop early and walk the rest of the way.  Make phone calls while standing up and walking around.  Take the stairs instead of elevators or escalators.  Wear comfortable clothes and shoes with good support.  Do not exercise so much that you hurt yourself, feel dizzy, or get very short of breath.  Where to find more information  [place] Department of Health and Human Services: www.hhs.gov  Centers for Disease Control and Prevention: www.cdc.gov  Contact a health care  provider:  Before starting a new exercise program.  If you have questions or concerns about your weight.  If you have a medical problem that keeps you from exercising.  Get help right away if:  You have any of the following while exercising:  Injury.  Dizziness.  Difficulty breathing or shortness of breath that does not go away when you stop exercising.  Chest pain.  Rapid heartbeat.  These symptoms may represent a serious problem that is an emergency. Do not wait to see if the symptoms will go away. Get medical help right away. Call your local emergency services (911 in the [place] Do not drive yourself to the hospital.  Summary  Getting regular exercise is especially important if you are overweight.  Being overweight increases your risk of heart disease, stroke, diabetes, high blood pressure, and several types of cancer.  Losing weight happens when you burn more calories than you eat.  Reducing the amount of calories you eat, and getting regular moderate or vigorous exercise each week, helps you lose weight.  This information is not intended to replace advice given to you by your health care provider. Make sure you discuss any questions you have with your health care provider.  Document Revised: 02/13/2022 Document Reviewed: 02/13/2022  Elsevier Patient Education © 2023 [employer]

## 2023-12-12 NOTE — PROGRESS NOTES
Subjective   Dawit Schmitz is a 33 y.o. male.     Chief Complaint   Patient presents with    Hypertension    Hypothyroidism    Obesity       PCP: Lynda Art APRN    History of Present Illness     The patient is a 33-year-old male who is here to follow up on chronic conditions including hypertension, hypothyroidism, and obesity.     His weight is trending up. He has gained an additional 1 pound since 09/2023 and currently weighs 387 pounds. His body mass index is BMI 50.5 kg/m2 today. 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 The patient has increased his activity and feels well when he is exercising. He is improving his diet.     His hypertension appears well controlled in the office with a blood pressure of 126/82 mmHg. He continues to take lisinopril 40 mg daily and is tolerating this. He is taking amlodipine and tolerates this. The patient denies any unusual cough, headaches, dizziness, chest pain, dyspnea. He notes swelling in his bilateral feet/ankles, which is typical for him due to prior injuries from wrestling, and he denies pitting edema.    He denies any new symptoms related to his hypothyroidism such as unusual fatigue, restlessness, jitteriness, constipation, or unusual weight changes. He takes levothyroxine.    The patient is taking hydroxyzine as needed for anxiety and takes between 0 and 3 tablets per day. He usually takes at least 2 tablets of hydroxyzine at night and occasionally also takes 1 tablet of hydroxyzine upon awakening. Hydroxyzine is effective for him.    He does not plan to undergo sports physical with Dr. Dumont this year, as this was required to wrestle in [place], which he is not doing at this time.     The patient inquires about medical marijuana card.    He is due for influenza vaccination and a COVID-19 booster.    On 09/12/2023, his TSH, lipid panel, CMP, and CBC were normal.    TSH Rfx On Abnormal To Free T4 (09/12/2023  10:45)     Lipid Panel (09/12/2023 10:45)     Comprehensive Metabolic Panel (09/12/2023 10:45)     CBC (No Diff) (09/12/2023 10:45)     The following portions of the patient's history were reviewed and updated as appropriate: allergies, current medications, past family history, past medical history, past social history, past surgical history and problem list.        Review of Systems   Constitutional:  Negative for fatigue, fever and unexpected weight loss.   Eyes:  Negative for blurred vision, double vision and visual disturbance.   Respiratory:  Negative for cough, shortness of breath and wheezing.    Cardiovascular:  Positive for leg swelling. Negative for chest pain and palpitations.   Gastrointestinal:  Negative for abdominal pain, constipation, diarrhea, nausea and vomiting.   Genitourinary:  Negative for difficulty urinating, frequency and urgency.   Musculoskeletal:  Negative for arthralgias and myalgias.   Skin:  Negative for color change and rash.   Neurological:  Negative for dizziness, weakness and headache.   Hematological:  Negative for adenopathy. Does not bruise/bleed easily.   Psychiatric/Behavioral:          Negative for restlessness, jitteriness.           Outpatient Medications Marked as Taking for the 12/12/23 encounter (Office Visit) with Lynda Art APRN   Medication Sig Dispense Refill    amLODIPine (NORVASC) 5 MG tablet Take 1 tablet by mouth Daily. 90 tablet 1    B Complex Vitamins (VITAMIN B COMPLEX PO) Take 1 tablet by mouth Daily.      hydrOXYzine (ATARAX) 50 MG tablet Take 1 tablet by mouth Every 8 (Eight) Hours As Needed for Itching. 90 tablet 2    levothyroxine (SYNTHROID, LEVOTHROID) 75 MCG tablet Take 1 tablet by mouth Daily. 90 tablet 1    lisinopril (PRINIVIL,ZESTRIL) 40 MG tablet Take 1 tablet by mouth Daily. 90 tablet 1    [DISCONTINUED] amLODIPine (NORVASC) 5 MG tablet Take 1 tablet by mouth Daily. 90 tablet 1    [DISCONTINUED] azithromycin (Zithromax) 250 MG tablet Take  "2 tablets to day and then one daily 6 tablet 0    [DISCONTINUED] hydrOXYzine (ATARAX) 50 MG tablet TAKE 1 TABLET BY MOUTH EVERY EIGHT HOURS AS NEEDED FOR ANXIETY 90 tablet 2    [DISCONTINUED] levothyroxine (SYNTHROID, LEVOTHROID) 75 MCG tablet Take 1 tablet by mouth Daily. 90 tablet 3    [DISCONTINUED] lisinopril (PRINIVIL,ZESTRIL) 40 MG tablet Take 1 tablet by mouth Daily. 90 tablet 1     Allergies   Allergen Reactions    Prozac [Fluoxetine Hcl] Anxiety     Worsened anxiety.            Objective   Physical Exam  Vitals and nursing note reviewed.   Constitutional:       Appearance: Normal appearance. He is well-developed.      Comments: Morbid obesity.   HENT:      Head: Normocephalic and atraumatic.   Eyes:      Conjunctiva/sclera: Conjunctivae normal.   Cardiovascular:      Rate and Rhythm: Normal rate and regular rhythm.      Heart sounds: Normal heart sounds.   Pulmonary:      Effort: Pulmonary effort is normal. No respiratory distress.      Breath sounds: Normal breath sounds.   Abdominal:      General: Bowel sounds are normal. There is no distension.      Palpations: Abdomen is soft.      Tenderness: There is no abdominal tenderness.   Musculoskeletal:      Cervical back: Normal range of motion.      Comments: Trace swelling to bilateral ankles.   Skin:     General: Skin is warm and dry.   Neurological:      Mental Status: He is alert and oriented to person, place, and time.   Psychiatric:         Speech: Speech normal.         Behavior: Behavior normal.         Thought Content: Thought content normal.         Judgment: Judgment normal.         Vitals:    12/12/23 1023   BP: 126/82   BP Location: Right arm   Patient Position: Sitting   Cuff Size: Adult   Pulse: 82   Resp: 18   Temp: 97.7 °F (36.5 °C)   TempSrc: Infrared   SpO2: 95%   Weight: (!) 176 kg (387 lb 12.8 oz)   Height: 186.7 cm (73.5\")     Body mass index is 50.47 kg/m².  Wt Readings from Last 3 Encounters:   12/12/23 (!) 176 kg (387 lb 12.8 oz) "   11/13/23 (!) 173 kg (381 lb)   09/18/23 (!) 175 kg (386 lb)             Assessment & Plan   Diagnoses and all orders for this visit:    1. Morbid (severe) obesity due to excess calories (Primary)    2. Anxiety  -     hydrOXYzine (ATARAX) 50 MG tablet; Take 1 tablet by mouth Every 8 (Eight) Hours As Needed for Itching.  Dispense: 90 tablet; Refill: 2    3. Essential hypertension  -     lisinopril (PRINIVIL,ZESTRIL) 40 MG tablet; Take 1 tablet by mouth Daily.  Dispense: 90 tablet; Refill: 1  -     amLODIPine (NORVASC) 5 MG tablet; Take 1 tablet by mouth Daily.  Dispense: 90 tablet; Refill: 1    4. Acquired hypothyroidism  -     levothyroxine (SYNTHROID, LEVOTHROID) 75 MCG tablet; Take 1 tablet by mouth Daily.  Dispense: 90 tablet; Refill: 1      1. Hypertension, well controlled  Amlodipine and lisinopril will be continued and were refilled.    2. Hypothyroidism, well controlled  He will continue levothyroxine, which was refilled.    3. Anxiety  He will continue hydroxyzine as needed, which was refilled.    4. Preventative care  The patient declined influenza vaccination and a COVID-19 booster today. He was informed that information regarding medical marijuana cards is not available at this time. He will follow up in 3 months for an annual examination and laboratory studies.          Return in about 3 months (around 3/12/2024) for Annual.    I discussed my findings,recommendations, and plan of care was with the patient. They verbalized understanding and agreement.  Patient was encouraged to keep me informed of any acute changes, lack of improvement, or any new concerning symptoms.     Transcribed from ambient dictation for NORBERTO Coulter by Arianna Sawyer.  12/12/23   13:24 EST    Patient or patient representative verbalized consent to the visit recording.  I have personally performed the services described in this document as transcribed by the above individual, and it is both accurate and complete.  Lynda CONRAD  NORBERTO Art  12/12/2023  16:21 EST

## 2024-01-08 DIAGNOSIS — I10 ESSENTIAL HYPERTENSION: ICD-10-CM

## 2024-01-09 RX ORDER — LISINOPRIL 40 MG/1
40 TABLET ORAL DAILY
Qty: 90 TABLET | Refills: 1 | OUTPATIENT
Start: 2024-01-09

## 2024-01-09 RX ORDER — AMLODIPINE BESYLATE 5 MG/1
5 TABLET ORAL DAILY
Qty: 90 TABLET | Refills: 1 | OUTPATIENT
Start: 2024-01-09

## 2024-03-13 ENCOUNTER — OFFICE VISIT (OUTPATIENT)
Dept: INTERNAL MEDICINE | Facility: CLINIC | Age: 34
End: 2024-03-13
Payer: COMMERCIAL

## 2024-03-13 VITALS
OXYGEN SATURATION: 97 % | RESPIRATION RATE: 18 BRPM | HEIGHT: 74 IN | WEIGHT: 315 LBS | SYSTOLIC BLOOD PRESSURE: 138 MMHG | DIASTOLIC BLOOD PRESSURE: 86 MMHG | TEMPERATURE: 97.7 F | HEART RATE: 86 BPM | BODY MASS INDEX: 40.43 KG/M2

## 2024-03-13 DIAGNOSIS — G89.29 CHRONIC RIGHT SHOULDER PAIN: ICD-10-CM

## 2024-03-13 DIAGNOSIS — M25.511 CHRONIC RIGHT SHOULDER PAIN: ICD-10-CM

## 2024-03-13 DIAGNOSIS — I10 ESSENTIAL HYPERTENSION: ICD-10-CM

## 2024-03-13 DIAGNOSIS — E66.01 MORBID OBESITY WITH BODY MASS INDEX (BMI) OF 45.0 TO 49.9 IN ADULT: Primary | ICD-10-CM

## 2024-03-13 PROCEDURE — 3079F DIAST BP 80-89 MM HG: CPT | Performed by: NURSE PRACTITIONER

## 2024-03-13 PROCEDURE — 1159F MED LIST DOCD IN RCRD: CPT | Performed by: NURSE PRACTITIONER

## 2024-03-13 PROCEDURE — 99214 OFFICE O/P EST MOD 30 MIN: CPT | Performed by: NURSE PRACTITIONER

## 2024-03-13 PROCEDURE — 3075F SYST BP GE 130 - 139MM HG: CPT | Performed by: NURSE PRACTITIONER

## 2024-03-13 PROCEDURE — 1160F RVW MEDS BY RX/DR IN RCRD: CPT | Performed by: NURSE PRACTITIONER

## 2024-03-13 RX ORDER — BUPROPION HYDROCHLORIDE 150 MG/1
150 TABLET ORAL EVERY MORNING
COMMUNITY
Start: 2024-02-27

## 2024-03-13 NOTE — PROGRESS NOTES
Subjective   Dawit Schmitz is a 33 y.o. male.     Chief Complaint   Patient presents with    Weight Loss       PCP: Lynda Art APRN    History of Present Illness   The patient is a 33-year-old male who is here to discuss weight loss. He is interested in trying Wegovy.    His maximum lifetime weight was 430 pounds. He has struggled with his weight his whole life. He has never been on weight loss medications. He has tried eating a healthy diet and working out. He does not like the idea of doing a diet because it does not last and is not sustainable. He has focused on portion control and calorie reduction. He did Weight Watchers for a short time but financially he could not continue. His typical diet consists of eggs and a bagel, a banana or a protein drink for breakfast, leftovers from the night before or a wrap for lunch. For dinner, they have been trying to eat a high protein, low carbs. He has cut back on eating processed foods. They are using a meal planning service. He lost weight when he started training again for wrestling, but he has been stuck in the 370 to 380 weight range. His weight in 12/2023 was 387 pounds and today his weight is 378 pounds, which brought his BMI from 50.5 to 49.2. He has seen a nutritionist in the last 1 to 2 years. He does not eat a lot of processed foods anymore. He is not interested in bariatric weight loss surgery.    Additionally, the patient reports he tore a tendon in his rotator cuff in high school and completed physical therapy for this. He states he reaggravated it again 1.5 years ago what the gym. He is interested in having physical therapy again for this, at [facility] where he is followed for his ankle.    The following portions of the patient's history were reviewed and updated as appropriate: allergies, current medications, past family history, past medical history, past social history, past surgical history and problem list.        Review of Systems    Constitutional:  Negative for fatigue and fever.   HENT:  Negative for trouble swallowing.    Eyes:  Negative for visual disturbance.   Respiratory:  Negative for cough and shortness of breath.    Cardiovascular:  Negative for chest pain and leg swelling.   Gastrointestinal:  Negative for abdominal pain.   Musculoskeletal:         Shoulder pain           Outpatient Medications Marked as Taking for the 3/13/24 encounter (Office Visit) with Lynda Art APRJESUS   Medication Sig Dispense Refill    amLODIPine (NORVASC) 5 MG tablet Take 1 tablet by mouth Daily. 90 tablet 1    B Complex Vitamins (VITAMIN B COMPLEX PO) Take 1 tablet by mouth Daily.      buPROPion XL (WELLBUTRIN XL) 150 MG 24 hr tablet Take 1 tablet by mouth Every Morning.      hydrOXYzine (ATARAX) 50 MG tablet Take 1 tablet by mouth Every 8 (Eight) Hours As Needed for Itching. 90 tablet 2    levothyroxine (SYNTHROID, LEVOTHROID) 75 MCG tablet Take 1 tablet by mouth Daily. 90 tablet 1    lisinopril (PRINIVIL,ZESTRIL) 40 MG tablet Take 1 tablet by mouth Daily. 90 tablet 1     Allergies   Allergen Reactions    Prozac [Fluoxetine Hcl] Anxiety     Worsened anxiety.            Objective   Physical Exam  Vitals and nursing note reviewed.   Constitutional:       Appearance: Normal appearance. He is well-developed. He is obese.   HENT:      Head: Normocephalic and atraumatic.   Eyes:      Conjunctiva/sclera: Conjunctivae normal.   Cardiovascular:      Rate and Rhythm: Normal rate and regular rhythm.      Heart sounds: Normal heart sounds.   Pulmonary:      Effort: Pulmonary effort is normal. No respiratory distress.      Breath sounds: Normal breath sounds.   Abdominal:      General: Bowel sounds are normal. There is no distension.      Palpations: Abdomen is soft.      Tenderness: There is no abdominal tenderness.   Musculoskeletal:      Cervical back: Normal range of motion.   Skin:     General: Skin is warm and dry.   Neurological:      Mental Status: He is  "alert and oriented to person, place, and time.   Psychiatric:         Speech: Speech normal.         Behavior: Behavior normal.         Thought Content: Thought content normal.         Judgment: Judgment normal.         Vitals:    03/13/24 1052   BP: 138/86   BP Location: Right arm   Patient Position: Sitting   Cuff Size: Large Adult   Pulse: 86   Resp: 18   Temp: 97.7 °F (36.5 °C)   TempSrc: Infrared   SpO2: 97%   Weight: (!) 171 kg (378 lb)   Height: 186.7 cm (73.5\")     Body mass index is 49.19 kg/m².  Wt Readings from Last 3 Encounters:   03/13/24 (!) 171 kg (378 lb)   12/12/23 (!) 176 kg (387 lb 12.8 oz)   11/13/23 (!) 173 kg (381 lb)             Assessment & Plan   Diagnoses and all orders for this visit:    1. Morbid obesity with body mass index (BMI) of 45.0 to 49.9 in adult (Primary)  -     Ambulatory Referral to Weight Management Program    2. Essential hypertension    3. Chronic right shoulder pain  -     Ambulatory Referral to Physical Therapy Evaluate and treat      Obesity.  I will refer him to weight management clinic.    Hypertension.  It is slightly elevated today.    Follow-up  We will reevaluate at his annual next month.        Return for Next scheduled follow up.    I discussed my findings,recommendations, and plan of care was with the patient. They verbalized understanding and agreement.  Patient was encouraged to keep me informed of any acute changes, lack of improvement, or any new concerning symptoms.    "

## 2024-05-06 ENCOUNTER — OFFICE VISIT (OUTPATIENT)
Dept: INTERNAL MEDICINE | Facility: CLINIC | Age: 34
End: 2024-05-06
Payer: COMMERCIAL

## 2024-05-06 VITALS
DIASTOLIC BLOOD PRESSURE: 92 MMHG | RESPIRATION RATE: 18 BRPM | HEIGHT: 74 IN | HEART RATE: 72 BPM | OXYGEN SATURATION: 98 % | WEIGHT: 315 LBS | TEMPERATURE: 97.5 F | BODY MASS INDEX: 40.43 KG/M2 | SYSTOLIC BLOOD PRESSURE: 130 MMHG

## 2024-05-06 DIAGNOSIS — Z00.00 ANNUAL PHYSICAL EXAM: Primary | ICD-10-CM

## 2024-05-06 DIAGNOSIS — F41.9 ANXIETY: ICD-10-CM

## 2024-05-06 DIAGNOSIS — E03.9 ACQUIRED HYPOTHYROIDISM: ICD-10-CM

## 2024-05-06 DIAGNOSIS — R68.82 DECREASED LIBIDO: ICD-10-CM

## 2024-05-06 DIAGNOSIS — I10 ESSENTIAL HYPERTENSION: ICD-10-CM

## 2024-05-06 PROCEDURE — 2014F MENTAL STATUS ASSESS: CPT | Performed by: NURSE PRACTITIONER

## 2024-05-06 PROCEDURE — 1159F MED LIST DOCD IN RCRD: CPT | Performed by: NURSE PRACTITIONER

## 2024-05-06 PROCEDURE — 1160F RVW MEDS BY RX/DR IN RCRD: CPT | Performed by: NURSE PRACTITIONER

## 2024-05-06 PROCEDURE — 3080F DIAST BP >= 90 MM HG: CPT | Performed by: NURSE PRACTITIONER

## 2024-05-06 PROCEDURE — 99395 PREV VISIT EST AGE 18-39: CPT | Performed by: NURSE PRACTITIONER

## 2024-05-06 PROCEDURE — 3075F SYST BP GE 130 - 139MM HG: CPT | Performed by: NURSE PRACTITIONER

## 2024-05-06 RX ORDER — LISINOPRIL 40 MG/1
40 TABLET ORAL DAILY
Qty: 90 TABLET | Refills: 1 | Status: SHIPPED | OUTPATIENT
Start: 2024-05-06

## 2024-05-06 RX ORDER — BUPROPION HYDROCHLORIDE 300 MG/1
300 TABLET ORAL EVERY MORNING
COMMUNITY
Start: 2024-04-10

## 2024-05-06 RX ORDER — LEVOTHYROXINE SODIUM 0.07 MG/1
75 TABLET ORAL DAILY
Qty: 90 TABLET | Refills: 1 | Status: SHIPPED | OUTPATIENT
Start: 2024-05-06

## 2024-05-06 RX ORDER — AMLODIPINE BESYLATE 10 MG/1
10 TABLET ORAL DAILY
Qty: 90 TABLET | Refills: 1 | Status: SHIPPED | OUTPATIENT
Start: 2024-05-06

## 2024-05-06 RX ORDER — PROPRANOLOL HYDROCHLORIDE 20 MG/1
20 TABLET ORAL 2 TIMES DAILY PRN
COMMUNITY
Start: 2024-04-10

## 2024-05-06 RX ORDER — HYDROXYZINE 50 MG/1
50 TABLET, FILM COATED ORAL EVERY 8 HOURS PRN
Qty: 90 TABLET | Refills: 2 | Status: SHIPPED | OUTPATIENT
Start: 2024-05-06

## 2024-05-15 ENCOUNTER — LAB (OUTPATIENT)
Dept: INTERNAL MEDICINE | Facility: CLINIC | Age: 34
End: 2024-05-15
Payer: COMMERCIAL

## 2024-05-15 DIAGNOSIS — Z00.00 ANNUAL PHYSICAL EXAM: ICD-10-CM

## 2024-05-15 DIAGNOSIS — E03.9 ACQUIRED HYPOTHYROIDISM: ICD-10-CM

## 2024-05-15 DIAGNOSIS — R68.82 DECREASED LIBIDO: ICD-10-CM

## 2024-05-15 DIAGNOSIS — F41.9 ANXIETY: ICD-10-CM

## 2024-05-15 DIAGNOSIS — I10 ESSENTIAL HYPERTENSION: ICD-10-CM

## 2024-05-15 LAB
ALBUMIN SERPL-MCNC: 4.2 G/DL (ref 3.5–5.2)
ALBUMIN/GLOB SERPL: 1.5 G/DL
ALP SERPL-CCNC: 84 U/L (ref 39–117)
ALT SERPL W P-5'-P-CCNC: 21 U/L (ref 1–41)
ANION GAP SERPL CALCULATED.3IONS-SCNC: 7.1 MMOL/L (ref 5–15)
AST SERPL-CCNC: 13 U/L (ref 1–40)
BILIRUB SERPL-MCNC: <0.2 MG/DL (ref 0–1.2)
BUN SERPL-MCNC: 13 MG/DL (ref 6–20)
BUN/CREAT SERPL: 10.6 (ref 7–25)
CALCIUM SPEC-SCNC: 9.6 MG/DL (ref 8.6–10.5)
CHLORIDE SERPL-SCNC: 107 MMOL/L (ref 98–107)
CHOLEST SERPL-MCNC: 134 MG/DL (ref 0–200)
CO2 SERPL-SCNC: 24.9 MMOL/L (ref 22–29)
CREAT SERPL-MCNC: 1.23 MG/DL (ref 0.76–1.27)
DEPRECATED RDW RBC AUTO: 44 FL (ref 37–54)
EGFRCR SERPLBLD CKD-EPI 2021: 79 ML/MIN/1.73
ERYTHROCYTE [DISTWIDTH] IN BLOOD BY AUTOMATED COUNT: 13 % (ref 12.3–15.4)
GLOBULIN UR ELPH-MCNC: 2.8 GM/DL
GLUCOSE SERPL-MCNC: 108 MG/DL (ref 65–99)
HCT VFR BLD AUTO: 42.9 % (ref 37.5–51)
HDLC SERPL-MCNC: 40 MG/DL (ref 40–60)
HGB BLD-MCNC: 14.3 G/DL (ref 13–17.7)
LDLC SERPL CALC-MCNC: 84 MG/DL (ref 0–100)
LDLC/HDLC SERPL: 2.13 {RATIO}
MCH RBC QN AUTO: 30.9 PG (ref 26.6–33)
MCHC RBC AUTO-ENTMCNC: 33.3 G/DL (ref 31.5–35.7)
MCV RBC AUTO: 92.7 FL (ref 79–97)
PLATELET # BLD AUTO: 274 10*3/MM3 (ref 140–450)
PMV BLD AUTO: 11.7 FL (ref 6–12)
POTASSIUM SERPL-SCNC: 4.5 MMOL/L (ref 3.5–5.2)
PROT SERPL-MCNC: 7 G/DL (ref 6–8.5)
RBC # BLD AUTO: 4.63 10*6/MM3 (ref 4.14–5.8)
SODIUM SERPL-SCNC: 139 MMOL/L (ref 136–145)
TRIGL SERPL-MCNC: 45 MG/DL (ref 0–150)
TSH SERPL DL<=0.05 MIU/L-ACNC: 2.14 UIU/ML (ref 0.27–4.2)
VLDLC SERPL-MCNC: 10 MG/DL (ref 5–40)
WBC NRBC COR # BLD AUTO: 6.07 10*3/MM3 (ref 3.4–10.8)

## 2024-05-15 PROCEDURE — 80050 GENERAL HEALTH PANEL: CPT | Performed by: NURSE PRACTITIONER

## 2024-05-15 PROCEDURE — 80061 LIPID PANEL: CPT | Performed by: NURSE PRACTITIONER

## 2024-05-15 PROCEDURE — 36415 COLL VENOUS BLD VENIPUNCTURE: CPT | Performed by: NURSE PRACTITIONER

## 2024-05-15 PROCEDURE — 84403 ASSAY OF TOTAL TESTOSTERONE: CPT | Performed by: NURSE PRACTITIONER

## 2024-05-15 PROCEDURE — 84402 ASSAY OF FREE TESTOSTERONE: CPT | Performed by: NURSE PRACTITIONER

## 2024-05-22 LAB
TESTOST FREE SERPL-MCNC: 4.9 PG/ML (ref 8.7–25.1)
TESTOST SERPL-MCNC: 323 NG/DL (ref 264–916)

## 2024-05-24 ENCOUNTER — TELEPHONE (OUTPATIENT)
Dept: INTERNAL MEDICINE | Facility: CLINIC | Age: 34
End: 2024-05-24
Payer: COMMERCIAL

## 2024-05-30 ENCOUNTER — CLINICAL SUPPORT (OUTPATIENT)
Dept: INTERNAL MEDICINE | Facility: CLINIC | Age: 34
End: 2024-05-30
Payer: COMMERCIAL

## 2024-05-30 VITALS
HEART RATE: 78 BPM | SYSTOLIC BLOOD PRESSURE: 154 MMHG | DIASTOLIC BLOOD PRESSURE: 104 MMHG | TEMPERATURE: 98.6 F | OXYGEN SATURATION: 97 %

## 2024-06-07 ENCOUNTER — TELEPHONE (OUTPATIENT)
Dept: INTERNAL MEDICINE | Facility: CLINIC | Age: 34
End: 2024-06-07
Payer: COMMERCIAL

## 2024-06-07 DIAGNOSIS — R79.89 LOW TESTOSTERONE: Primary | ICD-10-CM

## 2024-06-07 NOTE — TELEPHONE ENCOUNTER
Name: Dawit Schmitz      Relationship: Self      Best Callback Number: [phone]       HUB PROVIDED THE RELAY MESSAGE FROM THE OFFICE      PATIENT: VOICED UNDERSTANDING AND HAS NO FURTHER QUESTIONS AT THIS TIME    ADDITIONAL INFORMATION: PATIENT WOULD LIKE A REFERRAL PLACED FOR UROLOGY

## 2024-06-07 NOTE — TELEPHONE ENCOUNTER
Left patient voicemail to go over lab results.   Office number provided.        HUB RELAY:    Your labs are in acceptable ranges/are stable except your free testosterone slightly low.  Please let me know if you need a referral to the urologist for this-  I remember you were seeing someone point for testosterone replacement.  Otherwise, please continue your current treatment plan and/or medications.    Warm

## 2024-06-14 ENCOUNTER — OFFICE VISIT (OUTPATIENT)
Dept: INTERNAL MEDICINE | Facility: CLINIC | Age: 34
End: 2024-06-14
Payer: COMMERCIAL

## 2024-06-14 VITALS
HEART RATE: 84 BPM | OXYGEN SATURATION: 98 % | HEIGHT: 74 IN | DIASTOLIC BLOOD PRESSURE: 74 MMHG | WEIGHT: 315 LBS | SYSTOLIC BLOOD PRESSURE: 118 MMHG | BODY MASS INDEX: 40.43 KG/M2 | TEMPERATURE: 97.7 F | RESPIRATION RATE: 18 BRPM

## 2024-06-14 DIAGNOSIS — N50.82 SCROTAL PAIN: Primary | ICD-10-CM

## 2024-06-14 LAB
BILIRUB BLD-MCNC: NEGATIVE MG/DL
CLARITY, POC: CLEAR
COLOR UR: YELLOW
EXPIRATION DATE: NORMAL
GLUCOSE UR STRIP-MCNC: NEGATIVE MG/DL
KETONES UR QL: NEGATIVE
LEUKOCYTE EST, POC: NEGATIVE
Lab: NORMAL
NITRITE UR-MCNC: NEGATIVE MG/ML
PH UR: 6 [PH] (ref 5–8)
PROT UR STRIP-MCNC: NEGATIVE MG/DL
RBC # UR STRIP: NEGATIVE /UL
SP GR UR: 1.03 (ref 1–1.03)
UROBILINOGEN UR QL: NORMAL

## 2024-06-14 PROCEDURE — 1159F MED LIST DOCD IN RCRD: CPT | Performed by: NURSE PRACTITIONER

## 2024-06-14 PROCEDURE — 1125F AMNT PAIN NOTED PAIN PRSNT: CPT | Performed by: NURSE PRACTITIONER

## 2024-06-14 PROCEDURE — 3074F SYST BP LT 130 MM HG: CPT | Performed by: NURSE PRACTITIONER

## 2024-06-14 PROCEDURE — 3078F DIAST BP <80 MM HG: CPT | Performed by: NURSE PRACTITIONER

## 2024-06-14 PROCEDURE — 1160F RVW MEDS BY RX/DR IN RCRD: CPT | Performed by: NURSE PRACTITIONER

## 2024-06-14 PROCEDURE — 99213 OFFICE O/P EST LOW 20 MIN: CPT | Performed by: NURSE PRACTITIONER

## 2024-06-14 RX ORDER — ATOMOXETINE 60 MG/1
60 CAPSULE ORAL DAILY
COMMUNITY
Start: 2024-05-22

## 2024-06-14 NOTE — PROGRESS NOTES
Subjective   Dawit Schmitz is a 34 y.o. male.     Chief Complaint   Patient presents with    Groin Pain     Left side, pain and pressure, noticed three days        PCP: Lynda Art APRN    History of Present Illness /Review of systems    History of Present Illness  The patient is a 34-year-old male who is here to discuss some left groin pain that has been present for 3 days.    Approximately 2 to 3 days ago, the patient began experiencing pressure/discomfort in the left side of his scrotum, a symptom he has previously experienced as a teenager that improved with ejaculation. This pressure, was persistent throughout the day. The following day, the pressure had subsided, but it recurred later in the day. The pressure subsides at night, but recurs during the day. Yesterday, the pressure was at its worst, rating it as 2 or 3 out of 10, prompting him to schedule an appointment. He expressed concerns about potential testicular torsion or a urinary tract infection (UTI). He reported feeling swelling in the tubes behind the testicle yesterday, but no pain or tenderness outside of an usual state. He denies experiencing urinary urgency, frequency, dysuria, hematuria, or hematospermia. He also denies dyspareunia, fever, chills, or bowel issues. His bowel movements and urination are normal, with no increased frequency. He has not engaged in any strenuous activities at the gym, but he has been performing deep squats, but not over 135 pounds due to his knee condition. Yesterday afternoon, he noticed significant swelling, but by evening around 8 or 9 p.m., he did not note any swelling, pressure, or pain. He denies any history of undescended testicles or testicular torsion. He has no current concerns about the possibility of a sexually transmitted disease, including no penile discharge, lesions, or sores.    Results  Laboratory Studies  Urine sample was normal.    Results for orders placed or performed in visit on  06/14/24   POC Urinalysis Dipstick, Automated    Specimen: Urine   Result Value Ref Range    Color Yellow Yellow, Straw, Dark Yellow, Katia    Clarity, UA Clear Clear    Specific Gravity  1.030 1.005 - 1.030    pH, Urine 6.0 5.0 - 8.0    Leukocytes Negative Negative    Nitrite, UA Negative Negative    Protein, POC Negative Negative mg/dL    Glucose, UA Negative Negative mg/dL    Ketones, UA Negative Negative    Urobilinogen, UA Normal Normal, 0.2 E.U./dL    Bilirubin Negative Negative    Blood, UA Negative Negative    Lot Number [id number]     Expiration Date 01/14/2025        The following portions of the patient's history were reviewed and updated as appropriate: allergies, current medications, past family history, past medical history, past social history, past surgical history and problem list.        Outpatient Medications Marked as Taking for the 6/14/24 encounter (Office Visit) with Lynda Art APRN   Medication Sig Dispense Refill    amLODIPine (NORVASC) 10 MG tablet Take 1 tablet by mouth Daily. 90 tablet 1    atomoxetine (STRATTERA) 60 MG capsule Take 1 capsule by mouth Daily.      B Complex Vitamins (VITAMIN B COMPLEX PO) Take 1 tablet by mouth Daily.      buPROPion XL (WELLBUTRIN XL) 300 MG 24 hr tablet Take 1 tablet by mouth Every Morning.      hydrOXYzine (ATARAX) 50 MG tablet Take 1 tablet by mouth Every 8 (Eight) Hours As Needed for Itching. 90 tablet 2    levothyroxine (SYNTHROID, LEVOTHROID) 75 MCG tablet Take 1 tablet by mouth Daily. 90 tablet 1    lisinopril (PRINIVIL,ZESTRIL) 40 MG tablet Take 1 tablet by mouth Daily. 90 tablet 1    propranolol (INDERAL) 20 MG tablet Take 1 tablet by mouth 2 (Two) Times a Day As Needed (SITUATIONAL ANXIETY).       Allergies   Allergen Reactions    Prozac [Fluoxetine Hcl] Anxiety     Worsened anxiety.            Objective     Vitals:    06/14/24 1028   BP: 118/74   BP Location: Right arm   Patient Position: Sitting   Cuff Size: Adult   Pulse: 84   Resp:  "18   Temp: 97.7 °F (36.5 °C)   TempSrc: Infrared   SpO2: 98%   Weight: (!) 165 kg (363 lb 9.6 oz)   Height: 186.7 cm (73.5\")     Body mass index is 47.32 kg/m².  Wt Readings from Last 3 Encounters:   06/14/24 (!) 165 kg (363 lb 9.6 oz)   05/06/24 (!) 169 kg (373 lb 3.2 oz)   03/13/24 (!) 171 kg (378 lb)     Physical Exam  Vitals and nursing note reviewed. Exam conducted with a chaperone present.   Constitutional:       Appearance: Normal appearance. He is well-developed.   HENT:      Head: Normocephalic and atraumatic.   Eyes:      Conjunctiva/sclera: Conjunctivae normal.   Cardiovascular:      Rate and Rhythm: Normal rate and regular rhythm.      Heart sounds: Normal heart sounds.   Pulmonary:      Effort: Pulmonary effort is normal. No respiratory distress.      Breath sounds: Normal breath sounds.   Abdominal:      General: Bowel sounds are normal. There is no distension.      Palpations: Abdomen is soft.      Tenderness: There is no abdominal tenderness.   Genitourinary:     Penis: Normal.       Testes: Cremasteric reflex is present.         Right: Mass, tenderness, swelling, testicular hydrocele or varicocele not present. Right testis is descended. Cremasteric reflex is present.          Left: Tenderness present. Mass, swelling, testicular hydrocele or varicocele not present. Left testis is descended. Cremasteric reflex is present.    Musculoskeletal:      Cervical back: Normal range of motion.   Skin:     General: Skin is warm and dry.   Neurological:      Mental Status: He is alert and oriented to person, place, and time.   Psychiatric:         Speech: Speech normal.         Behavior: Behavior normal.         Thought Content: Thought content normal.         Judgment: Judgment normal.                   Assessment & Plan   Diagnoses and all orders for this visit:    1. Scrotal pain (Primary)  -     POC Urinalysis Dipstick, Automated  -     US scrotum and testicles; Future        Assessment & Plan  1. Left " scrotal pain.  Upon examination, no abnormalities were detected in the testicular /scrotal exam, suggesting no suspicion of testicular torsion. The possibility of a hydrocele or varicocele, a minor fluid accumulation, was considered, which is typically benign. A urine sample will be collected to rule out a urinary tract infection (UTI). An ultrasound will be ordered to further investigate the issue.    Follow-up  Follow-up will be conducted as necessary.            Return if symptoms worsen or fail to improve.    I discussed my findings,recommendations, and plan of care was with the patient. They verbalized understanding and agreement.  Patient was encouraged to keep me informed of any acute changes, lack of improvement, or any new concerning symptoms.     Patient or patient representative verbalized consent for the use of Ambient Listening during the visit with  NORBERTO Coulter for chart documentation. 6/14/2024  13:43 EDT

## 2024-07-05 DIAGNOSIS — I10 ESSENTIAL HYPERTENSION: ICD-10-CM

## 2024-07-05 RX ORDER — AMLODIPINE BESYLATE 5 MG/1
5 TABLET ORAL DAILY
Qty: 90 TABLET | Refills: 1 | OUTPATIENT
Start: 2024-07-05

## 2024-08-15 ENCOUNTER — OFFICE VISIT (OUTPATIENT)
Dept: UROLOGY | Facility: CLINIC | Age: 34
End: 2024-08-15
Payer: COMMERCIAL

## 2024-08-15 DIAGNOSIS — R79.89 LOW TESTOSTERONE: Primary | ICD-10-CM

## 2024-08-15 NOTE — PROGRESS NOTES
Low Testosterone Office Visit      Patient Name: Dawit Schmitz  : 1990   MRN: [id number]     Chief Complaint: Low Testosterone.    Chief Complaint   Patient presents with    Low testostone   .     Referring Provider: Lynda Art APRN    History of Present Illness: Mr. Schmitz is a 34 y.o. male with history of low testosterone.  He has previously been managed with testosterone pellets from 5838-0400, he reports improvement in fatigue at that time. He is a . He reports history of fatigue and lethargy.     Total Testosterone       Hematocrit 05/15/24; 42.9.       Low libido   no  Low energy   yes  Poor sleep   no  Mental clarity issues  no  History of depression? no  Erectile dysfunction?  no  Any potential interest in conception?  no      Subjective      Review of System: Review of Systems   I have reviewed the ROS documented by my clinical staff, I have updated appropriately and I agree. NORBERTO De La Torre    Past Medical History:  Past Medical History:   Diagnosis Date    Anxiety     Arthritis of left ankle     Depression     Essential hypertension 2021    Fracture, foot     growth plate in right heel    Gastroesophageal reflux disease 2017    Hypothyroidism     Rotator cuff syndrome     right    Testosterone insufficiency        Past Surgical History:  Past Surgical History:   Procedure Laterality Date    KNEE ACL RECONSTRUCTION Right 2023    Procedure: KNEE ARTHROSCOPY WITH ANTERIOR CRUCIATE LIGAMENT RECONSTRUCTION WITH QUAD TENDON AUTOGRAFT- RIGHT;  Surgeon: Sajan Baldwni MD;  Location: [facility];  Service: Orthopedics;  Laterality: Right;    NO PAST SURGERIES         Medications:    Current Outpatient Medications:     amLODIPine (NORVASC) 10 MG tablet, Take 1 tablet by mouth Daily., Disp: 90 tablet, Rfl: 1    atomoxetine (STRATTERA) 60 MG capsule, Take 1 capsule by mouth Daily., Disp: , Rfl:     B Complex Vitamins (VITAMIN B COMPLEX PO), Take 1  tablet by mouth Daily., Disp: , Rfl:     buPROPion XL (WELLBUTRIN XL) 300 MG 24 hr tablet, Take 1 tablet by mouth Every Morning., Disp: , Rfl:     hydrOXYzine (ATARAX) 50 MG tablet, Take 1 tablet by mouth Every 8 (Eight) Hours As Needed for Itching., Disp: 90 tablet, Rfl: 2    levothyroxine (SYNTHROID, LEVOTHROID) 75 MCG tablet, Take 1 tablet by mouth Daily., Disp: 90 tablet, Rfl: 1    lisinopril (PRINIVIL,ZESTRIL) 40 MG tablet, Take 1 tablet by mouth Daily., Disp: 90 tablet, Rfl: 1    propranolol (INDERAL) 20 MG tablet, Take 1 tablet by mouth 2 (Two) Times a Day As Needed (SITUATIONAL ANXIETY)., Disp: , Rfl:     Allergies:  Allergies   Allergen Reactions    Prozac [Fluoxetine Hcl] Anxiety     Worsened anxiety.        Social History:  Social History     Socioeconomic History    Marital status:    Tobacco Use    Smoking status: Former     Current packs/day: 0.00     Average packs/day: 0.5 packs/day for 9.0 years (4.5 ttl pk-yrs)     Types: Cigarettes     Start date: 2010     Quit date: 2019     Years since quittin.6     Passive exposure: Past    Smokeless tobacco: Former     Types: Snuff, Chew     Quit date: 2018   Vaping Use    Vaping status: Some Days    Substances: THC    Devices: Pre-filled or refillable cartridge   Substance and Sexual Activity    Alcohol use: No    Drug use: Yes     Frequency: 42.0 times per week     Types: Marijuana     Comment: uses six servings a day    Sexual activity: Defer       Family History:  Family History   Problem Relation Age of Onset    Diabetes Paternal Uncle     Lung cancer Maternal Grandmother     Hypertension Mother     Thyroid disease Mother          IPSS Questionnaire (AUA-7):  Over the past month…    1)  Incomplete Emptying:       How often have you had a sensation of not emptying you had the sensation of not emptying your bladder completely after you finished urinating?  0 - Not at all   2)  Frequency:       How often have you had the urinate again  "less than two hours after you finished urinating?  0 - Not at all   3)  Intermittency:       How often have you found you stopped and started again several times when you urinated?   0 - Not at all   4) Urgency:      How often have you found it difficult to postpone urination?  1 - Less than 1 time in 5   5) Weak Stream:      How often have you had a weak urinary stream?  0 - Not at all   6) Straining:       How often have you had to push or strain to begin urination?  0 - Not at all   7) Nocturia:      How many times did you most typically get up to urinate from the time you went to bed at night until the time you got up in the morning?  3 - 3 times   Total Score:  4   The International Prostate Symptom Score (IPSS) is used to screen, diagnose, track symptoms of benign prostatic hyperplasia (BPH).   0-7 (Mild Symptoms) 8-19 (Moderate) 20-35 (Severe)   Quality of Life (QoL):  If you were to spend the rest of your life with your urinary condition just the way it is now, how would you feel about that? 1-Pleased   Urine Leakage (Incontinence) 0-No Leakage       Objective     Physical Exam:   Vital Signs: There were no vitals filed for this visit.  There is no height or weight on file to calculate BMI.     Physical Exam    Labs:   No results found for: \"TESTOSTERONE\"    No results found for: \"PSA\"    Lab Results   Component Value Date    WBC 6.07 05/15/2024    HGB 14.3 05/15/2024    HCT 42.9 05/15/2024    MCV 92.7 05/15/2024     05/15/2024       Images:   No Images in the past 120 days found..    Measures:   Tobacco:   Dawit Schmitz  reports that he quit smoking about 5 years ago. His smoking use included cigarettes. He started smoking about 14 years ago. He has a 4.5 pack-year smoking history. He has been exposed to tobacco smoke. He quit smokeless tobacco use about 6 years ago.  His smokeless tobacco use included snuff and chew.           Urine Incontinence: Patient reports that he is not currently " experiencing any symptoms of urinary incontinence.   Assessment / Plan      Assessment/Plan:   Mr. Schmitz is a 34 y.o. male who presented today with low testosterone.     We will plan to recheck total testosterone.  We will also check PSA, luteinizing hormone and prolactin.  He will follow-up in 1 week for telehealth visit to review lab results.  If testosterone is low and other labs are normal we will plan to proceed with testosterone replacement.  He is understanding and agreeable with plan of care.    Diagnoses and all orders for this visit:    1. Low testosterone (Primary)  -     Testosterone; Future  -     Prolactin; Future  -     Luteinizing Hormone; Future  -     PSA Diagnostic; Future         Patient Education:   We discussed today the role testosterone plays for a male patient. I have indicated that low testosterone can be associated with metabolic syndrome, erectile dysfunction, coronary artery disease, diabetes, depression, osteoporosis, fatigue, low sex drive, poor sleep, high cholesterol, increased abdominal fat, muscle loss, irritability, hot flashes, and inability to concentrate.     Treatment options were discussed including SERMs, aromatase inhibitors, testosterone injections every 2-3 weeks and long-acting injections every 10 weeks, and testosterone pellets placed in clinic every 4-6 months.    I explained the risks, benefits, and alternatives to testosterone therapies. I informed him of the potential SEs of chest and leg swelling, increased acne, change in mood, polycythemia, and worsening of undiagnosed prostate cancer.     Follow Up:   Return for 1 week telehealth, Labs prior .    I spent approximately 30 minutes providing clinical care for this patient; including review of patient's chart and provider documentation, face to face time spent with patient in examination room (obtaining history, performing physical exam, discussing diagnosis and management options), placing orders, and completing  patient documentation.     NORBERTO De La Torre  [facility]

## 2024-08-20 ENCOUNTER — LAB (OUTPATIENT)
Dept: INTERNAL MEDICINE | Facility: CLINIC | Age: 34
End: 2024-08-20
Payer: COMMERCIAL

## 2024-08-20 DIAGNOSIS — R79.89 LOW TESTOSTERONE: ICD-10-CM

## 2024-08-20 LAB
LH SERPL-ACNC: 8.66 MIU/ML
PROLACTIN SERPL-MCNC: 12.3 NG/ML (ref 4.04–15.2)
PSA SERPL-MCNC: 0.23 NG/ML (ref 0–4)
TESTOST SERPL-MCNC: 303 NG/DL (ref 249–836)

## 2024-08-20 PROCEDURE — 36415 COLL VENOUS BLD VENIPUNCTURE: CPT | Performed by: NURSE PRACTITIONER

## 2024-08-20 PROCEDURE — 84146 ASSAY OF PROLACTIN: CPT | Performed by: NURSE PRACTITIONER

## 2024-08-20 PROCEDURE — 83002 ASSAY OF GONADOTROPIN (LH): CPT | Performed by: NURSE PRACTITIONER

## 2024-08-20 PROCEDURE — 84403 ASSAY OF TOTAL TESTOSTERONE: CPT | Performed by: NURSE PRACTITIONER

## 2024-08-20 PROCEDURE — 84153 ASSAY OF PSA TOTAL: CPT | Performed by: NURSE PRACTITIONER

## 2024-08-21 ENCOUNTER — TELEMEDICINE (OUTPATIENT)
Dept: UROLOGY | Facility: CLINIC | Age: 34
End: 2024-08-21
Payer: COMMERCIAL

## 2024-08-21 DIAGNOSIS — R79.89 LOW TESTOSTERONE: Primary | ICD-10-CM

## 2024-08-21 RX ORDER — SYRINGE W-NEEDLE,DISPOSAB,3 ML 25GX5/8"
1 SYRINGE, EMPTY DISPOSABLE MISCELLANEOUS
Qty: 50 EACH | Refills: 0 | Status: SHIPPED | OUTPATIENT
Start: 2024-08-21

## 2024-08-21 RX ORDER — TESTOSTERONE CYPIONATE 200 MG/ML
1 VIAL (ML) INTRAMUSCULAR
Qty: 2 ML | Refills: 0 | Status: SHIPPED | OUTPATIENT
Start: 2024-08-21

## 2024-08-21 NOTE — PROGRESS NOTES
Low Testosterone Office Visit      Patient Name: Dawit Schmitz  : 1990   MRN: [id number]     Chief Complaint: Low Testosterone.  No chief complaint on file.  .     Referring Provider: No ref. provider found    History of Present Illness: Mr. Schmitz is a 34 y.o. male with history of low testosterone.      Telehealth conducted at [facility]. Patient is at home for telehealth appointment.     Labs 24;   PSA 0.230  Prolactin; 12.30  LH; 8.66  Total Testosterone; 303.  Subjective      Review of System: Review of Systems   Constitutional:  Positive for fatigue.   All other systems reviewed and are negative.     I have reviewed the ROS documented by my clinical staff, I have updated appropriately and I agree. NORBERTO De La Torre    Past Medical History:  Past Medical History:   Diagnosis Date    Anxiety     Arthritis of left ankle     Depression     Essential hypertension 2021    Fracture, foot     growth plate in right heel    Gastroesophageal reflux disease 2017    Hypothyroidism     Rotator cuff syndrome     right    Testosterone insufficiency        Past Surgical History:  Past Surgical History:   Procedure Laterality Date    KNEE ACL RECONSTRUCTION Right 2023    Procedure: KNEE ARTHROSCOPY WITH ANTERIOR CRUCIATE LIGAMENT RECONSTRUCTION WITH QUAD TENDON AUTOGRAFT- RIGHT;  Surgeon: Sajan Baldwin MD;  Location: [facility];  Service: Orthopedics;  Laterality: Right;    NO PAST SURGERIES         Medications:    Current Outpatient Medications:     amLODIPine (NORVASC) 10 MG tablet, Take 1 tablet by mouth Daily., Disp: 90 tablet, Rfl: 1    atomoxetine (STRATTERA) 60 MG capsule, Take 1 capsule by mouth Daily., Disp: , Rfl:     B Complex Vitamins (VITAMIN B COMPLEX PO), Take 1 tablet by mouth Daily., Disp: , Rfl:     buPROPion XL (WELLBUTRIN XL) 300 MG 24 hr tablet, Take 1 tablet by mouth Every Morning., Disp: , Rfl:     hydrOXYzine (ATARAX) 50 MG tablet, Take 1  "tablet by mouth Every 8 (Eight) Hours As Needed for Itching., Disp: 90 tablet, Rfl: 2    levothyroxine (SYNTHROID, LEVOTHROID) 75 MCG tablet, Take 1 tablet by mouth Daily., Disp: 90 tablet, Rfl: 1    lisinopril (PRINIVIL,ZESTRIL) 40 MG tablet, Take 1 tablet by mouth Daily., Disp: 90 tablet, Rfl: 1    propranolol (INDERAL) 20 MG tablet, Take 1 tablet by mouth 2 (Two) Times a Day As Needed (SITUATIONAL ANXIETY)., Disp: , Rfl:     Syringe 22G X 1-/2\" 3 ML misc, Use 1 mL Every 14 (Fourteen) Days., Disp: 50 each, Rfl: 0    Testosterone Cypionate 200 MG/ML solution, Inject 1 mL as directed Every 14 (Fourteen) Days., Disp: 2 mL, Rfl: 0    Allergies:  Allergies   Allergen Reactions    Prozac [Fluoxetine Hcl] Anxiety     Worsened anxiety.        Social History:  Social History     Socioeconomic History    Marital status:    Tobacco Use    Smoking status: Former     Current packs/day: 0.00     Average packs/day: 0.5 packs/day for 9.0 years (4.5 ttl pk-yrs)     Types: Cigarettes     Start date: 2010     Quit date: 2019     Years since quittin.6     Passive exposure: Past    Smokeless tobacco: Former     Types: Snuff, Chew     Quit date: 2018   Vaping Use    Vaping status: Some Days    Substances: THC    Devices: Pre-filled or refillable cartridge   Substance and Sexual Activity    Alcohol use: No    Drug use: Yes     Frequency: 42.0 times per week     Types: Marijuana     Comment: uses six servings a day    Sexual activity: Defer       Family History:  Family History   Problem Relation Age of Onset    Diabetes Paternal Uncle     Lung cancer Maternal Grandmother     Hypertension Mother     Thyroid disease Mother          Objective     Physical Exam:   Vital Signs: There were no vitals filed for this visit.  There is no height or weight on file to calculate BMI.     Physical Exam  Vitals and nursing note reviewed.   Constitutional:       Appearance: Normal appearance.   HENT:      Head: Normocephalic and " "atraumatic.      Nose: Nose normal.   Eyes:      Pupils: Pupils are equal, round, and reactive to light.   Pulmonary:      Effort: Pulmonary effort is normal.   Abdominal:      Palpations: Abdomen is soft.   Musculoskeletal:      Cervical back: Normal range of motion.   Neurological:      General: No focal deficit present.      Mental Status: He is alert.   Psychiatric:         Mood and Affect: Mood normal.         Labs:   Lab Results   Component Value Date    TESTOSTERONE 303.00 08/20/2024       Lab Results   Component Value Date    PSA 0.230 08/20/2024       Lab Results   Component Value Date    WBC 6.07 05/15/2024    HGB 14.3 05/15/2024    HCT 42.9 05/15/2024    MCV 92.7 05/15/2024     05/15/2024       Images:   No Images in the past 120 days found..    Measures:   Tobacco:   Dawit Schmitz  reports that he quit smoking about 5 years ago. His smoking use included cigarettes. He started smoking about 14 years ago. He has a 4.5 pack-year smoking history. He has been exposed to tobacco smoke. He quit smokeless tobacco use about 6 years ago.  His smokeless tobacco use included snuff and chew.          Urine Incontinence: Patient reports that he is not currently experiencing any symptoms of urinary incontinence.    Assessment / Plan      Assessment/Plan:   Mr. Schmitz is a 34 y.o. male who presented today with low testosterone.      He will begin testosterone cypionate 200 mg / 1 mL every 14 days.  Will follow-up in 3 months with labs prior.    Diagnoses and all orders for this visit:    1. Low testosterone (Primary)  -     Testosterone; Future  -     CBC (No Diff); Future  -     Testosterone Cypionate 200 MG/ML solution; Inject 1 mL as directed Every 14 (Fourteen) Days.  Dispense: 2 mL; Refill: 0  -     Syringe 22G X 1-1/2\" 3 ML misc; Use 1 mL Every 14 (Fourteen) Days.  Dispense: 50 each; Refill: 0       Patient Education:   We discussed today the role testosterone plays for a male patient. I have " indicated that low testosterone can be associated with metabolic syndrome, erectile dysfunction, coronary artery disease, diabetes, depression, osteoporosis, fatigue, low sex drive, poor sleep, high cholesterol, increased abdominal fat, muscle loss, irritability, hot flashes, and inability to concentrate.     Treatment options were discussed including SERMs, aromatase inhibitors,  testosterone injections every 2-3 weeks, and long-acting injections every 10 weeks  I explained the risks, benefits, and alternatives to testosterone therapies. I informed him of the potential SEs of chest and leg swelling, increased acne, change in mood, polycythemia, and worsening of undiagnosed prostate cancer.     Follow Up:   Return in about 3 months (around 11/21/2024) for Labs prior .    I spent approximately 20 minutes providing clinical care via telehealth visit for this patient; including review of patient's chart and provider documentation, face to face time spent with patient (obtaining history, performing physical exam, discussing diagnosis and management options), placing orders, and completing patient documentation.     NORBERTO De La Torre  [facility]

## 2024-09-20 DIAGNOSIS — R79.89 LOW TESTOSTERONE: ICD-10-CM

## 2024-09-25 RX ORDER — TESTOSTERONE CYPIONATE 200 MG/ML
1 VIAL (ML) INTRAMUSCULAR
Qty: 2 ML | Refills: 0 | Status: SHIPPED | OUTPATIENT
Start: 2024-09-25

## 2024-10-01 ENCOUNTER — OFFICE VISIT (OUTPATIENT)
Dept: INTERNAL MEDICINE | Facility: CLINIC | Age: 34
End: 2024-10-01
Payer: COMMERCIAL

## 2024-10-01 ENCOUNTER — LAB (OUTPATIENT)
Dept: INTERNAL MEDICINE | Facility: CLINIC | Age: 34
End: 2024-10-01
Payer: COMMERCIAL

## 2024-10-01 VITALS
SYSTOLIC BLOOD PRESSURE: 122 MMHG | HEART RATE: 80 BPM | OXYGEN SATURATION: 97 % | BODY MASS INDEX: 40.43 KG/M2 | WEIGHT: 315 LBS | HEIGHT: 74 IN | RESPIRATION RATE: 14 BRPM | TEMPERATURE: 97.8 F | DIASTOLIC BLOOD PRESSURE: 92 MMHG

## 2024-10-01 DIAGNOSIS — E03.9 ACQUIRED HYPOTHYROIDISM: ICD-10-CM

## 2024-10-01 DIAGNOSIS — E66.01 MORBID OBESITY WITH BODY MASS INDEX (BMI) OF 45.0 TO 49.9 IN ADULT: ICD-10-CM

## 2024-10-01 DIAGNOSIS — I10 ESSENTIAL HYPERTENSION: Primary | ICD-10-CM

## 2024-10-01 DIAGNOSIS — F41.9 ANXIETY: ICD-10-CM

## 2024-10-01 PROCEDURE — 3080F DIAST BP >= 90 MM HG: CPT | Performed by: NURSE PRACTITIONER

## 2024-10-01 PROCEDURE — 99214 OFFICE O/P EST MOD 30 MIN: CPT | Performed by: NURSE PRACTITIONER

## 2024-10-01 PROCEDURE — 36415 COLL VENOUS BLD VENIPUNCTURE: CPT | Performed by: NURSE PRACTITIONER

## 2024-10-01 PROCEDURE — 1160F RVW MEDS BY RX/DR IN RCRD: CPT | Performed by: NURSE PRACTITIONER

## 2024-10-01 PROCEDURE — 1159F MED LIST DOCD IN RCRD: CPT | Performed by: NURSE PRACTITIONER

## 2024-10-01 PROCEDURE — 3074F SYST BP LT 130 MM HG: CPT | Performed by: NURSE PRACTITIONER

## 2024-10-01 PROCEDURE — 84443 ASSAY THYROID STIM HORMONE: CPT | Performed by: NURSE PRACTITIONER

## 2024-10-01 PROCEDURE — 1125F AMNT PAIN NOTED PAIN PRSNT: CPT | Performed by: NURSE PRACTITIONER

## 2024-10-01 NOTE — PROGRESS NOTES
Dawit Schmitz presents to [facility] for     Chief Complaint  Chief Complaint   Patient presents with    Anxiety    Hypertension    Hypothyroidism       PCP:  Lynda Art APRN    Subjective        History of Present Illness    Dawit is a 34-year-old male who is here to follow-up on anxiety, hypertension, and hypothyroidism.  He reports overall he has been doing fairly well.  He has been working on weight loss.  He is lost 24 pounds since 6/2024.  He has been doing some intermittent fasting, working on portion control, making healthier choices, and snacking less.  He is also staying physically active with wrestling.  He reports that he was able to get back into the wrestling wearing recently and felt better than he had before.  He does endorse that his hands are hurting a little bit more he wonders if this is related to his hypothyroidism and is interested in potentially increasing his levothyroxine.    His last TSH was normal on 5/15/2024 with a level of 2.140.  He has had some intermittent fatigue but this is resolved now.  No constipation, diarrhea, skin changes, hair changes, restlessness or unintentional weight changes.    He is seeing behavioral health for anxiety.  They now have him on bupropion as needed hydroxyzine, as needed propranolol and he feels as though is doing well with this combination.    Hypertension has been fairly well-controlled.  /92 in clinic today.  He does not monitor blood pressure at home.  Denies headaches, dizziness, visual disturbances, palpitations chest pain, dyspnea, TIA or CVA symptoms, leg pain/claudication symptoms, and edema.      PHQ-2 Depression Screening  Little interest or pleasure in doing things? 0-->not at all   Feeling down, depressed, or hopeless? 0-->not at all   PHQ-2 Total Score 0         Health Maintenance:   Health Maintenance   Topic Date Due    COVID-19 Vaccine (5 - 2023-24 season) 10/03/2024 (Originally  "9/1/2024)    INFLUENZA VACCINE  03/31/2025 (Originally 8/1/2024)    BMI FOLLOWUP  12/12/2024    ANNUAL PHYSICAL  05/06/2025    TDAP/TD VACCINES (3 - Td or Tdap) 05/23/2033    HEPATITIS C SCREENING  Completed    Pneumococcal Vaccine 0-64  Aged Out         Allergies   Allergen Reactions    Prozac [Fluoxetine Hcl] Anxiety     Worsened anxiety.      Current Outpatient Medications on File Prior to Visit   Medication Sig Dispense Refill    amLODIPine (NORVASC) 10 MG tablet Take 1 tablet by mouth Daily. 90 tablet 1    atomoxetine (STRATTERA) 60 MG capsule Take 1 capsule by mouth Daily.      B Complex Vitamins (VITAMIN B COMPLEX PO) Take 1 tablet by mouth Daily.      buPROPion XL (WELLBUTRIN XL) 300 MG 24 hr tablet Take 1 tablet by mouth Every Morning.      hydrOXYzine (ATARAX) 50 MG tablet Take 1 tablet by mouth Every 8 (Eight) Hours As Needed for Itching. 90 tablet 2    levothyroxine (SYNTHROID, LEVOTHROID) 75 MCG tablet Take 1 tablet by mouth Daily. 90 tablet 1    lisinopril (PRINIVIL,ZESTRIL) 40 MG tablet Take 1 tablet by mouth Daily. 90 tablet 1    propranolol (INDERAL) 20 MG tablet Take 1 tablet by mouth 2 (Two) Times a Day As Needed (SITUATIONAL ANXIETY).      Syringe 22G X 1-1/2\" 3 ML misc Use 1 mL Every 14 (Fourteen) Days. 50 each 0    Testosterone Cypionate 200 MG/ML solution Inject 1 mL as directed Every 14 (Fourteen) Days. 2 mL 0     No current facility-administered medications on file prior to visit.         The following portions of the patient's history were reviewed and updated as appropriate: allergies, current medications, past family history, past medical history, past social history, past surgical history and problem list and are available for review within electronic record    Objective         Vital Signs:   /92   Pulse 80   Temp 97.8 °F (36.6 °C) (Infrared)   Resp 14   Ht 188 cm (74\")   Wt (!) 154 kg (339 lb 3.2 oz)   SpO2 97%   BMI 43.55 kg/m²         Physical Exam  Vitals and nursing " note reviewed.   Constitutional:       Appearance: Normal appearance. He is well-developed. He is morbidly obese.   HENT:      Head: Normocephalic and atraumatic.   Eyes:      Conjunctiva/sclera: Conjunctivae normal.   Cardiovascular:      Rate and Rhythm: Normal rate and regular rhythm.      Heart sounds: Normal heart sounds.   Pulmonary:      Effort: Pulmonary effort is normal. No respiratory distress.      Breath sounds: Normal breath sounds.   Abdominal:      General: Bowel sounds are normal. There is no distension.      Palpations: Abdomen is soft.      Tenderness: There is no abdominal tenderness.   Musculoskeletal:      Right hand: Normal.      Left hand: Normal.      Cervical back: Normal range of motion.   Skin:     General: Skin is warm and dry.   Neurological:      Mental Status: He is alert and oriented to person, place, and time.   Psychiatric:         Speech: Speech normal.         Behavior: Behavior normal.         Thought Content: Thought content normal.         Judgment: Judgment normal.                 Assessment and Plan      Diagnoses and all orders for this visit:    1. Essential hypertension (Primary)  Well-controlled.  Continue amlodipine and lisinopril at current doses.  2. Acquired hypothyroidism  -     TSH Rfx On Abnormal To Free T4; Future  Symptoms stable.  Doubt hand pain related to thyroid.  Will go ahead and recheck TSH.  Continue levothyroxine at 75 mcg daily unless labs indicate need to change.    3. Anxiety  Well-controlled with current regimen.  Continue to follow with behavioral health for medication management.  4. Morbid obesity with body mass index (BMI) of 45.0 to 49.9 in adult  Continue weight loss efforts.      Follow Up     Patient was given instructions and counseling regarding his condition or for health maintenance advice. Please see specific information pulled into the AVS if appropriate.   Any new medication's adverse effects have been discussed in detail with  patient  Patient was encouraged to keep me informed of any acute changes, lack of improvement, or any new concerning symptoms.    Return in about 31 weeks (around 5/6/2025) for Annual.          Dictated Utilizing Dragon Dictation   Please note that portions of this note were completed with a voice recognition program.   Part of this note may be an electronic transcription/translation of spoken language to printed text using the Dragon Dictation System.

## 2024-10-02 LAB — TSH SERPL DL<=0.05 MIU/L-ACNC: 2.21 UIU/ML (ref 0.27–4.2)

## 2024-10-11 ENCOUNTER — TELEPHONE (OUTPATIENT)
Dept: INTERNAL MEDICINE | Facility: CLINIC | Age: 34
End: 2024-10-11
Payer: COMMERCIAL

## 2024-10-11 NOTE — TELEPHONE ENCOUNTER
Called and lvm for patient to return call, office number given.     RELAY:     Your labs are in acceptable ranges/are stable. Please continue your current treatment plan and/or medications.   Written by NORBERTO Fontanez on 10/8/2024  7:55 AM EDT

## 2024-10-15 DIAGNOSIS — R79.89 LOW TESTOSTERONE: ICD-10-CM

## 2024-10-16 RX ORDER — TESTOSTERONE CYPIONATE 200 MG/ML
1 VIAL (ML) INTRAMUSCULAR
Qty: 2 ML | Refills: 0 | Status: SHIPPED | OUTPATIENT
Start: 2024-10-16

## 2024-10-16 NOTE — TELEPHONE ENCOUNTER
Rx Refill Note  Requested Prescriptions     Pending Prescriptions Disp Refills    Testosterone Cypionate 200 MG/ML solution 2 mL 0     Sig: Inject 1 mL as directed Every 14 (Fourteen) Days.      Last office visit with prescribing clinician: 8/15/2024   Last telemedicine visit with prescribing clinician: 8/21/2024   Next office visit with prescribing clinician: 11/20/2024       Tanya Harper  10/16/24, 08:03 EDT

## 2024-11-17 DIAGNOSIS — R79.89 LOW TESTOSTERONE: ICD-10-CM

## 2024-11-18 ENCOUNTER — LAB (OUTPATIENT)
Dept: INTERNAL MEDICINE | Facility: CLINIC | Age: 34
End: 2024-11-18
Payer: COMMERCIAL

## 2024-11-18 DIAGNOSIS — R79.89 LOW TESTOSTERONE: ICD-10-CM

## 2024-11-18 LAB
DEPRECATED RDW RBC AUTO: 41.4 FL (ref 37–54)
ERYTHROCYTE [DISTWIDTH] IN BLOOD BY AUTOMATED COUNT: 12.1 % (ref 12.3–15.4)
HCT VFR BLD AUTO: 45 % (ref 37.5–51)
HGB BLD-MCNC: 15 G/DL (ref 13–17.7)
MCH RBC QN AUTO: 30.9 PG (ref 26.6–33)
MCHC RBC AUTO-ENTMCNC: 33.3 G/DL (ref 31.5–35.7)
MCV RBC AUTO: 92.8 FL (ref 79–97)
PLATELET # BLD AUTO: 282 10*3/MM3 (ref 140–450)
PMV BLD AUTO: 11.9 FL (ref 6–12)
RBC # BLD AUTO: 4.85 10*6/MM3 (ref 4.14–5.8)
TESTOST SERPL-MCNC: 849 NG/DL (ref 249–836)
WBC NRBC COR # BLD AUTO: 6.32 10*3/MM3 (ref 3.4–10.8)

## 2024-11-18 PROCEDURE — 85027 COMPLETE CBC AUTOMATED: CPT | Performed by: NURSE PRACTITIONER

## 2024-11-18 PROCEDURE — 36415 COLL VENOUS BLD VENIPUNCTURE: CPT | Performed by: NURSE PRACTITIONER

## 2024-11-18 PROCEDURE — 84403 ASSAY OF TOTAL TESTOSTERONE: CPT | Performed by: NURSE PRACTITIONER

## 2024-11-18 NOTE — TELEPHONE ENCOUNTER
Rx Refill Note  Requested Prescriptions     Pending Prescriptions Disp Refills    Testosterone Cypionate 200 MG/ML solution 2 mL 0     Sig: Inject 1 mL as directed Every 14 (Fourteen) Days.      Last office visit with prescribing clinician: 8/15/2024   Last telemedicine visit with prescribing clinician: 8/21/2024   Next office visit with prescribing clinician: 11/20/2024     Mary Hayes MA  11/18/24, 08:09 EST

## 2024-11-19 RX ORDER — TESTOSTERONE CYPIONATE 200 MG/ML
1 VIAL (ML) INTRAMUSCULAR
Qty: 2 ML | Refills: 0 | Status: SHIPPED | OUTPATIENT
Start: 2024-11-19

## 2024-12-11 ENCOUNTER — OFFICE VISIT (OUTPATIENT)
Dept: UROLOGY | Facility: CLINIC | Age: 34
End: 2024-12-11
Payer: COMMERCIAL

## 2024-12-11 VITALS
BODY MASS INDEX: 40.43 KG/M2 | HEART RATE: 89 BPM | DIASTOLIC BLOOD PRESSURE: 95 MMHG | HEIGHT: 74 IN | SYSTOLIC BLOOD PRESSURE: 135 MMHG | OXYGEN SATURATION: 93 % | WEIGHT: 315 LBS

## 2024-12-11 DIAGNOSIS — R79.89 LOW TESTOSTERONE: Primary | ICD-10-CM

## 2024-12-11 DIAGNOSIS — I10 ESSENTIAL HYPERTENSION: ICD-10-CM

## 2024-12-11 DIAGNOSIS — Z00.00 ANNUAL PHYSICAL EXAM: ICD-10-CM

## 2024-12-11 RX ORDER — AMLODIPINE BESYLATE 10 MG/1
10 TABLET ORAL DAILY
Qty: 90 TABLET | Refills: 1 | Status: SHIPPED | OUTPATIENT
Start: 2024-12-11

## 2024-12-11 RX ORDER — TESTOSTERONE CYPIONATE 200 MG/ML
1 VIAL (ML) INTRAMUSCULAR
Qty: 2 ML | Refills: 0 | Status: SHIPPED | OUTPATIENT
Start: 2024-12-11

## 2024-12-11 NOTE — PROGRESS NOTES
"     Follow Up Office Visit      Patient Name: Dawit Schmitz  : 1990   MRN: [id number]     Chief Complaint:    Chief Complaint   Patient presents with    Hypogonadism       Referring Provider: No ref. provider found    History of Present Illness: Dawit Schmitz is a 34 y.o. male who presents today for follow up of Low Testosterone. He is currently injecting Testosterone Cypionate 200mg/1ml every 14 days. He reports improvement in muscle mass, improvement in erections and improvement in fatigue.     Labs 24  Testosterone; 849  Hematocrit; 45.0  Subjective      Review of System: Review of Systems   All other systems reviewed and are negative.     I have reviewed the ROS documented by my clinical staff, I have updated appropriately and I agree. NORBERTO Cantrell    I have reviewed and the following portions of the patient's history were updated as appropriate: past family history, past medical history, past social history, past surgical history and problem list.    Medications:     Current Outpatient Medications:     amLODIPine (NORVASC) 10 MG tablet, Take 1 tablet by mouth Daily., Disp: 90 tablet, Rfl: 1    atomoxetine (STRATTERA) 60 MG capsule, Take 1 capsule by mouth Daily., Disp: , Rfl:     B Complex Vitamins (VITAMIN B COMPLEX PO), Take 1 tablet by mouth Daily., Disp: , Rfl:     buPROPion XL (WELLBUTRIN XL) 300 MG 24 hr tablet, Take 1 tablet by mouth Every Morning., Disp: , Rfl:     hydrOXYzine (ATARAX) 50 MG tablet, Take 1 tablet by mouth Every 8 (Eight) Hours As Needed for Itching., Disp: 90 tablet, Rfl: 2    levothyroxine (SYNTHROID, LEVOTHROID) 75 MCG tablet, Take 1 tablet by mouth Daily., Disp: 90 tablet, Rfl: 1    lisinopril (PRINIVIL,ZESTRIL) 40 MG tablet, Take 1 tablet by mouth Daily., Disp: 90 tablet, Rfl: 1    propranolol (INDERAL) 20 MG tablet, Take 1 tablet by mouth 2 (Two) Times a Day As Needed (SITUATIONAL ANXIETY)., Disp: , Rfl:     Syringe 22G X 1-/2\" 3 ML misc, Use " "1 mL Every 14 (Fourteen) Days., Disp: 50 each, Rfl: 0    Testosterone Cypionate 200 MG/ML solution, Inject 1 mL as directed Every 14 (Fourteen) Days., Disp: 2 mL, Rfl: 0    Allergies:   Allergies   Allergen Reactions    Prozac [Fluoxetine Hcl] Anxiety     Worsened anxiety.        Objective     Physical Exam:   Vital Signs:   Vitals:    12/11/24 0930   BP: 135/95   Pulse: 89   SpO2: 93%   Weight: (!) 152 kg (335 lb)   Height: 188 cm (74\")     Body mass index is 43.01 kg/m².     Physical Exam  Vitals and nursing note reviewed.   Constitutional:       Appearance: Normal appearance.   HENT:      Head: Normocephalic and atraumatic.      Nose: Nose normal.      Mouth/Throat:      Mouth: Mucous membranes are moist.   Eyes:      Pupils: Pupils are equal, round, and reactive to light.   Pulmonary:      Effort: Pulmonary effort is normal.   Abdominal:      General: Abdomen is flat.      Palpations: Abdomen is soft.   Musculoskeletal:         General: Normal range of motion.      Cervical back: Normal range of motion.   Skin:     General: Skin is warm and dry.      Capillary Refill: Capillary refill takes less than 2 seconds.   Neurological:      General: No focal deficit present.      Mental Status: He is alert.   Psychiatric:         Mood and Affect: Mood normal.       Labs:   Brief Urine Lab Results  (Last result in the past 365 days)        Color   Clarity   Blood   Leuk Est   Nitrite   Protein   CREAT   Urine HCG        06/14/24 1113 Yellow   Clear   Negative   Negative   Negative   Negative                        Lab Results   Component Value Date    GLUCOSE 108 (H) 05/15/2024    CALCIUM 9.6 05/15/2024     05/15/2024    K 4.5 05/15/2024    CO2 24.9 05/15/2024     05/15/2024    BUN 13 05/15/2024    CREATININE 1.23 05/15/2024    EGFRIFAFRI 108 11/20/2018    EGFRIFNONA 82 06/15/2021    BCR 10.6 05/15/2024    ANIONGAP 7.1 05/15/2024       Lab Results   Component Value Date    WBC 6.32 11/18/2024    HGB 15.0 " 11/18/2024    HCT 45.0 11/18/2024    MCV 92.8 11/18/2024     11/18/2024       Images:   No Images in the past 120 days found..    Measures:   Tobacco:   Dawit Schmitz  reports that he quit smoking about 5 years ago. His smoking use included cigarettes. He started smoking about 14 years ago. He has a 4.5 pack-year smoking history. He has been exposed to tobacco smoke. He quit smokeless tobacco use about 6 years ago.  His smokeless tobacco use included snuff and chew.          Urine Incontinence: Patient reports that he is not currently experiencing any symptoms of urinary incontinence.      Assessment / Plan      Assessment/Plan:   34 y.o. male who presented today for follow up of Low testosterone.  He is doing well.  Labs are stable.  He will follow-up in 3 months with labs prior.  He is agreeable plan of care.    LIN Reviewed.     Diagnoses and all orders for this visit:    1. Low testosterone (Primary)  -     CBC (No Diff); Future  -     Testosterone; Future  -     Testosterone Cypionate 200 MG/ML solution; Inject 1 mL as directed Every 14 (Fourteen) Days.  Dispense: 2 mL; Refill: 0         Follow Up:   Return in about 3 months (around 3/11/2025) for Labs prior .    I spent approximately 20 minutes providing clinical care for this patient; including review of patient's chart and provider documentation, face to face time spent with patient in examination room (obtaining history, performing physical exam, discussing diagnosis and management options), placing orders, and completing patient documentation.     NORBERTO De La Torre  [facility] [place]

## 2024-12-11 NOTE — TELEPHONE ENCOUNTER
Rx Refill Note  Requested Prescriptions     Pending Prescriptions Disp Refills    amLODIPine (NORVASC) 10 MG tablet [Pharmacy Med Name: AMLODIPINE BESYLATE 10MG TABLETS] 90 tablet 1     Sig: TAKE 1 TABLET BY MOUTH DAILY      Last office visit with prescribing clinician: 10/1/2024   Next office visit with prescribing clinician: 5/7/2025       Henny Cook  12/11/24, 10:08 EST

## 2024-12-14 DIAGNOSIS — Z00.00 ANNUAL PHYSICAL EXAM: ICD-10-CM

## 2024-12-14 DIAGNOSIS — E03.9 ACQUIRED HYPOTHYROIDISM: ICD-10-CM

## 2024-12-16 RX ORDER — LEVOTHYROXINE SODIUM 75 UG/1
75 TABLET ORAL DAILY
Qty: 90 TABLET | Refills: 1 | Status: SHIPPED | OUTPATIENT
Start: 2024-12-16

## 2025-01-06 DIAGNOSIS — I10 ESSENTIAL HYPERTENSION: ICD-10-CM

## 2025-01-06 DIAGNOSIS — Z00.00 ANNUAL PHYSICAL EXAM: ICD-10-CM

## 2025-01-07 RX ORDER — LISINOPRIL 40 MG/1
40 TABLET ORAL DAILY
Qty: 90 TABLET | Refills: 0 | Status: SHIPPED | OUTPATIENT
Start: 2025-01-07

## 2025-01-10 DIAGNOSIS — R79.89 LOW TESTOSTERONE: ICD-10-CM

## 2025-01-13 NOTE — TELEPHONE ENCOUNTER
Rx Refill Note  Requested Prescriptions     Pending Prescriptions Disp Refills    Testosterone Cypionate (DEPOTESTOTERONE CYPIONATE) 200 MG/ML injection [Pharmacy Med Name: TESTOSTERONE CYP 200MG/ML SDV 1ML] 2 mL      Sig: INJECT 1ML IN THE MUSCLE EVERY 14 DAYS      Last office visit with prescribing clinician: 12/11/2024   Last telemedicine visit with prescribing clinician: 8/21/2024   Next office visit with prescribing clinician: 3/11/2025       Dora Torrez MA  01/13/25, 07:44 EST

## 2025-01-14 RX ORDER — TESTOSTERONE CYPIONATE 200 MG/ML
INJECTION, SOLUTION INTRAMUSCULAR
Qty: 2 ML | Refills: 0 | Status: SHIPPED | OUTPATIENT
Start: 2025-01-14

## 2025-02-06 DIAGNOSIS — R79.89 LOW TESTOSTERONE: ICD-10-CM

## 2025-02-06 RX ORDER — TESTOSTERONE CYPIONATE 200 MG/ML
INJECTION, SOLUTION INTRAMUSCULAR
Qty: 2 ML | Refills: 0 | Status: SHIPPED | OUTPATIENT
Start: 2025-02-06

## 2025-02-06 NOTE — TELEPHONE ENCOUNTER
Rx Refill Note  Requested Prescriptions     Pending Prescriptions Disp Refills    Testosterone Cypionate (DEPOTESTOTERONE CYPIONATE) 200 MG/ML injection [Pharmacy Med Name: TESTOSTERONE CYP 200MG/ML SDV 1ML] 2 mL      Sig: ADMINISTER 1 ML IN THE MUSCLE EVERY 14 DAYS      Last office visit with prescribing clinician: 12/11/2024   Last telemedicine visit with prescribing clinician: 8/21/2024   Next office visit with prescribing clinician: 3/11/2025       Dora Torrez MA  02/06/25, 11:08 EST

## 2025-03-06 ENCOUNTER — TELEPHONE (OUTPATIENT)
Dept: UROLOGY | Facility: CLINIC | Age: 35
End: 2025-03-06
Payer: COMMERCIAL

## 2025-03-06 NOTE — TELEPHONE ENCOUNTER
Called pt to remind him of labs that need to be completed before his appt on 3/11 with Buffy Thompson. Pt voiced understanding and stated he would get them done.

## 2025-03-10 ENCOUNTER — TELEPHONE (OUTPATIENT)
Dept: UROLOGY | Facility: CLINIC | Age: 35
End: 2025-03-10
Payer: COMMERCIAL

## 2025-03-10 DIAGNOSIS — R79.89 LOW TESTOSTERONE: ICD-10-CM

## 2025-03-10 NOTE — TELEPHONE ENCOUNTER
Called and LVM to reschedule appt with Buffy THORNTON, hub ok to reschedule or can be transferred into office.

## 2025-03-10 NOTE — TELEPHONE ENCOUNTER
Name: Dawit Schmitz Valentin    Relationship: Self    Best Callback Number: [phone]    HUB PROVIDED THE RELAY MESSAGE FROM THE OFFICE   PATIENT SCHEDULED AS REQUESTED    ADDITIONAL INFORMATION:  FOLLOW UP APPT RE-SCHEDULED TO 4/9/25 @ 1:30 PM.

## 2025-03-11 NOTE — TELEPHONE ENCOUNTER
Rx Refill Note  Requested Prescriptions     Pending Prescriptions Disp Refills    Testosterone Cypionate (DEPOTESTOTERONE CYPIONATE) 200 MG/ML injection [Pharmacy Med Name: TESTOSTERONE CYP 200MG/ML SDV 1ML] 2 mL      Sig: ADMINISTER 1 ML IN THE MUSCLE EVERY 14 DAYS      Last office visit with prescribing clinician: 12/11/2024   Next office visit with prescribing clinician: 4/9/2025       Dora Torrez MA  03/11/25, 07:29 EDT

## 2025-03-12 RX ORDER — TESTOSTERONE CYPIONATE 200 MG/ML
INJECTION, SOLUTION INTRAMUSCULAR
Qty: 2 ML | Refills: 0 | Status: SHIPPED | OUTPATIENT
Start: 2025-03-12

## 2025-03-16 DIAGNOSIS — I10 ESSENTIAL HYPERTENSION: ICD-10-CM

## 2025-03-16 DIAGNOSIS — Z00.00 ANNUAL PHYSICAL EXAM: ICD-10-CM

## 2025-03-18 RX ORDER — AMLODIPINE BESYLATE 10 MG/1
10 TABLET ORAL DAILY
Qty: 90 TABLET | Refills: 1 | OUTPATIENT
Start: 2025-03-18

## 2025-04-07 ENCOUNTER — TELEPHONE (OUTPATIENT)
Dept: UROLOGY | Facility: CLINIC | Age: 35
End: 2025-04-07
Payer: COMMERCIAL

## 2025-04-07 ENCOUNTER — LAB (OUTPATIENT)
Dept: LAB | Facility: HOSPITAL | Age: 35
End: 2025-04-07
Payer: COMMERCIAL

## 2025-04-07 DIAGNOSIS — Z00.00 ANNUAL PHYSICAL EXAM: ICD-10-CM

## 2025-04-07 DIAGNOSIS — I10 ESSENTIAL HYPERTENSION: ICD-10-CM

## 2025-04-07 NOTE — TELEPHONE ENCOUNTER
Caller: SERAFIN CUELLO    Relationship to patient: SELF    Best call back number:[phone]    Chief complaint: PATIENT DID NOT GET TO THE LAB ON TIME FRIDAY TO GET LABS COMPLETED FOR HIS APPT THAT WAS SCHEDULED FOR APRIL 9TH. WE RESCHEDULE HIS APPT BUT NOT UNTIL JUNE 23RD. PATIENT ASKING IF ANYWAY POSSIBLE TO GET IN SOONER. APPT HAS BEEN ADDED TO CANCELLATION LIST    Type of visit: FU ON TESTOSTERONE LEVELS

## 2025-04-07 NOTE — TELEPHONE ENCOUNTER
Pt has not gotten lab work done yet, will check back on status to see when it is completed to see about moving appointment up

## 2025-04-08 RX ORDER — LISINOPRIL 40 MG/1
40 TABLET ORAL DAILY
Qty: 30 TABLET | Refills: 0 | Status: SHIPPED | OUTPATIENT
Start: 2025-04-08

## 2025-04-09 DIAGNOSIS — R79.89 LOW TESTOSTERONE: ICD-10-CM

## 2025-04-09 RX ORDER — TESTOSTERONE CYPIONATE 200 MG/ML
200 INJECTION, SOLUTION INTRAMUSCULAR
Qty: 2 ML | Refills: 0 | Status: SHIPPED | OUTPATIENT
Start: 2025-04-09

## 2025-04-10 NOTE — TELEPHONE ENCOUNTER
Called and LVM to see about moving appt up, hub ok to reschedule to sooner appt if availability is open and pt has completed lab work

## 2025-05-05 DIAGNOSIS — I10 ESSENTIAL HYPERTENSION: ICD-10-CM

## 2025-05-05 DIAGNOSIS — Z00.00 ANNUAL PHYSICAL EXAM: ICD-10-CM

## 2025-05-07 ENCOUNTER — OFFICE VISIT (OUTPATIENT)
Dept: INTERNAL MEDICINE | Facility: CLINIC | Age: 35
End: 2025-05-07
Payer: COMMERCIAL

## 2025-05-07 VITALS
OXYGEN SATURATION: 97 % | WEIGHT: 310 LBS | SYSTOLIC BLOOD PRESSURE: 118 MMHG | RESPIRATION RATE: 16 BRPM | BODY MASS INDEX: 39.78 KG/M2 | TEMPERATURE: 97.5 F | DIASTOLIC BLOOD PRESSURE: 84 MMHG | HEART RATE: 84 BPM | HEIGHT: 74 IN

## 2025-05-07 DIAGNOSIS — Z00.00 ROUTINE GENERAL MEDICAL EXAMINATION AT A HEALTH CARE FACILITY: Primary | ICD-10-CM

## 2025-05-07 DIAGNOSIS — I10 ESSENTIAL HYPERTENSION: ICD-10-CM

## 2025-05-07 DIAGNOSIS — Z00.00 ANNUAL PHYSICAL EXAM: ICD-10-CM

## 2025-05-07 DIAGNOSIS — M79.641 BILATERAL HAND PAIN: ICD-10-CM

## 2025-05-07 DIAGNOSIS — M79.642 BILATERAL HAND PAIN: ICD-10-CM

## 2025-05-07 DIAGNOSIS — E03.9 ACQUIRED HYPOTHYROIDISM: ICD-10-CM

## 2025-05-07 RX ORDER — LISINOPRIL 40 MG/1
40 TABLET ORAL DAILY
Qty: 90 TABLET | Refills: 0 | Status: SHIPPED | OUTPATIENT
Start: 2025-05-07

## 2025-05-07 RX ORDER — AMLODIPINE BESYLATE 10 MG/1
10 TABLET ORAL DAILY
Qty: 90 TABLET | Refills: 1 | Status: SHIPPED | OUTPATIENT
Start: 2025-05-07

## 2025-05-07 RX ORDER — LEVOTHYROXINE SODIUM 75 UG/1
75 TABLET ORAL DAILY
Qty: 90 TABLET | Refills: 1 | Status: SHIPPED | OUTPATIENT
Start: 2025-05-07

## 2025-05-07 RX ORDER — LISINOPRIL 40 MG/1
40 TABLET ORAL DAILY
Qty: 90 TABLET | Refills: 0 | Status: SHIPPED | OUTPATIENT
Start: 2025-05-07 | End: 2025-05-07 | Stop reason: SDUPTHER

## 2025-05-07 NOTE — PROGRESS NOTES
Patient Care Team:  Lynda Art APRN as PCP - General (Nurse Practitioner)  Sajan Baldwin MD as Consulting Physician (Orthopedic Surgery)  Enedelia France APRN (Nurse Practitioner)  Buffy Thompson APRN as Nurse Practitioner (Urology)     Chief Complaint   Patient presents with    Annual Exam     Pt is fasting              Patient is a 35 y.o. male who presents for his yearly physical exam.     HPI  History of Present Illness  The patient is a 35-year-old male who presents for his annual examination. He also has hypertension and hypothyroidism.    He reports a stable condition with regards to his anxiety, with no significant changes. He continues to receive care from [facility], with biannual visits. He believes his medication regimen is effective, although he experiences fluctuations in his mood. He does not endorse any feelings of depression.    He has been experiencing weight loss, which he attributes to intermittent fasting, although he does not monitor his caloric or protein intake. His physical activity is limited due to work commitments, but he maintains a high level of ambulation. He does not endorse any symptoms of constipation, diarrhea, jitteriness, restlessness, sleep disturbances, skin changes, or hair changes. He does not endorse any chest pain, palpitations, shortness of breath, headaches, dizziness, bowel irregularities, urinary issues, frequency, burning sensation during urination, dribbling, or weak stream. He does not endorse any alcohol consumption, cigarette smoking, or vaping, but admits to using marijuana recreationally. He has declined the COVID-19 vaccine at this time.    He reports persistent hand pain, which was previously evaluated with thyroid testing that yielded normal results. He suspects the cause to be arthritis. The pain is bilateral and diffuse, and it impairs his ability to  objects for extended periods. He has discontinued his job as a  due to the pain  associated with handling paper bags. He also reports weakness in his , which has affected his performance in wrestling matches. He does not endorse any numbness or tingling sensations. He has not been diagnosed with carpal tunnel syndrome. He experiences wrist pain, which he describes as either bony or radiating from his hand. He has been managing the pain with daily Tylenol Arthritis, which provides relief. He recalls being informed of early signs of arthritis in his ankle at the age of 28. He experiences morning stiffness in his hands, which gradually subsides but is replaced by pain. He has noticed a decrease in his ability to lift heavy dumbbells at the gym over the past 2 years.    SOCIAL HISTORY  He does not smoke cigarettes or vape. He still smokes weed but does not use it medically.    FAMILY HISTORY  He is not aware of any autoimmune conditions like rheumatoid arthritis in the family. Blood pressure issues are a big family problem.      Health maintenance/lifestyle:  Health Maintenance   Topic Date Due    ANNUAL PHYSICAL  05/06/2025    COVID-19 Vaccine (5 - 2024-25 season) 11/07/2025 (Originally 9/1/2024)    INFLUENZA VACCINE  07/01/2025    TDAP/TD VACCINES (3 - Td or Tdap) 05/23/2033    HEPATITIS C SCREENING  Completed    Pneumococcal Vaccine 0-49  Aged Out     Immunization History   Administered Date(s) Administered    COVID-19 (PFIZER) BIVALENT 12+YRS 05/01/2023    COVID-19 (PFIZER) Purple Cap Monovalent 05/19/2021, 08/19/2021    Covid-19 (Pfizer) Gray Cap Monovalent 06/29/2022    Hep B, Adolescent or Pediatric 10/05/2000, 11/09/2000    Tdap 06/29/2022, 05/23/2023     Cancer-related family history includes Lung cancer in his maternal grandmother.  Sexual activity questions deferred to the physician.  Social History     Tobacco Use   Smoking Status Former    Current packs/day: 0.00    Average packs/day: 0.5 packs/day for 9.0 years (4.5 ttl pk-yrs)    Types: Cigarettes    Start date: 1/1/2010    Quit  date: 2019    Years since quittin.3    Passive exposure: Past   Smokeless Tobacco Former    Types: Snuff, Chew    Quit date: 2018     Social History     Substance and Sexual Activity   Alcohol Use No       Results        PHQ-2 Depression Screening  Little interest or pleasure in doing things?     Feeling down, depressed, or hopeless?     PHQ-2 Total Score               History  Social History     Socioeconomic History    Marital status:    Tobacco Use    Smoking status: Former     Current packs/day: 0.00     Average packs/day: 0.5 packs/day for 9.0 years (4.5 ttl pk-yrs)     Types: Cigarettes     Start date: 2010     Quit date: 2019     Years since quittin.3     Passive exposure: Past    Smokeless tobacco: Former     Types: Snuff, Chew     Quit date: 2018   Vaping Use    Vaping status: Some Days    Substances: THC    Devices: Disposable, Pre-filled or refillable cartridge   Substance and Sexual Activity    Alcohol use: No    Drug use: Yes     Frequency: 42.0 times per week     Types: Marijuana     Comment: uses six servings a day    Sexual activity: Defer     Past Medical History:   Diagnosis Date    Anxiety     Arthritis of left ankle     Depression     Essential hypertension 2021    Fracture, foot     growth plate in right heel    Gastroesophageal reflux disease 2017    Hypothyroidism     Rotator cuff syndrome     right    Testosterone insufficiency       Past Surgical History:   Procedure Laterality Date    KNEE ACL RECONSTRUCTION Right 2023    Procedure: KNEE ARTHROSCOPY WITH ANTERIOR CRUCIATE LIGAMENT RECONSTRUCTION WITH QUAD TENDON AUTOGRAFT- RIGHT;  Surgeon: Sajan Baldwin MD;  Location: [facility];  Service: Orthopedics;  Laterality: Right;    NO PAST SURGERIES        Allergies   Allergen Reactions    Prozac [Fluoxetine Hcl] Anxiety     Worsened anxiety.       Family History   Problem Relation Age of Onset    Diabetes Paternal Uncle     Lung cancer  "Maternal Grandmother     Hypertension Mother     Thyroid disease Mother        Current Outpatient Medications:     amLODIPine (NORVASC) 10 MG tablet, Take 1 tablet by mouth Daily., Disp: 90 tablet, Rfl: 1    atomoxetine (STRATTERA) 60 MG capsule, Take 1 capsule by mouth Daily., Disp: , Rfl:     buPROPion XL (WELLBUTRIN XL) 300 MG 24 hr tablet, Take 1 tablet by mouth Every Morning., Disp: , Rfl:     hydrOXYzine (ATARAX) 50 MG tablet, Take 1 tablet by mouth Every 8 (Eight) Hours As Needed for Itching., Disp: 90 tablet, Rfl: 2    levothyroxine (SYNTHROID, LEVOTHROID) 75 MCG tablet, Take 1 tablet by mouth Daily., Disp: 90 tablet, Rfl: 1    lisinopril (PRINIVIL,ZESTRIL) 40 MG tablet, Take 1 tablet by mouth Daily., Disp: 90 tablet, Rfl: 0    propranolol (INDERAL) 20 MG tablet, Take 1 tablet by mouth 2 (Two) Times a Day As Needed (SITUATIONAL ANXIETY)., Disp: , Rfl:     Syringe 22G X 1-1/2\" 3 ML misc, Use 1 mL Every 14 (Fourteen) Days., Disp: 50 each, Rfl: 0    Testosterone Cypionate (DEPOTESTOTERONE CYPIONATE) 200 MG/ML injection, Inject 1 mL into the appropriate muscle as directed by prescriber Every 14 (Fourteen) Days., Disp: 2 mL, Rfl: 0    B Complex Vitamins (VITAMIN B COMPLEX PO), Take 1 tablet by mouth Daily. (Patient not taking: Reported on 5/7/2025), Disp: , Rfl:                   /84 (BP Location: Right arm, Patient Position: Sitting, Cuff Size: Adult)   Pulse 84   Temp 97.5 °F (36.4 °C) (Infrared)   Resp 16   Ht 188 cm (74\")   Wt (!) 141 kg (310 lb)   SpO2 97%   BMI 39.80 kg/m²       Physical Exam  Neck: Supple, no abnormalities  Respiratory: Clear to auscultation, no wheezing, rales or rhonchi  Cardiovascular: Regular rate and rhythm, no murmurs, rubs, or gallops  Gastrointestinal: Soft, no tenderness, no distention, no masses  Extremities: Good pulses and capillary refill  Physical Exam  Vitals and nursing note reviewed.   Constitutional:       General: He is not in acute distress.     Appearance: " Normal appearance. He is well-developed. He is obese. He is not ill-appearing, toxic-appearing or diaphoretic.   HENT:      Head: Normocephalic and atraumatic.      Right Ear: Tympanic membrane and external ear normal.      Left Ear: Tympanic membrane and external ear normal.      Nose: Nose normal.      Mouth/Throat:      Lips: Pink. No lesions.      Mouth: Mucous membranes are moist.      Tongue: No lesions.      Palate: No mass.   Eyes:      General: Lids are normal. No scleral icterus.        Right eye: No discharge.         Left eye: No discharge.      Conjunctiva/sclera: Conjunctivae normal.      Pupils: Pupils are equal, round, and reactive to light.   Neck:      Thyroid: No thyroid mass, thyromegaly or thyroid tenderness.      Vascular: No carotid bruit or JVD.      Trachea: No tracheal deviation.   Cardiovascular:      Rate and Rhythm: Normal rate and regular rhythm.      Chest Wall: PMI is not displaced. No thrill.      Pulses: No decreased pulses.      Heart sounds: Normal heart sounds. No murmur heard.     No friction rub. No gallop.   Pulmonary:      Effort: Pulmonary effort is normal. No respiratory distress.      Breath sounds: Normal breath sounds.   Chest:      Chest wall: No tenderness.   Abdominal:      General: Bowel sounds are normal. There is no distension.      Palpations: Abdomen is soft. There is no mass.      Tenderness: There is no abdominal tenderness. There is no guarding or rebound.      Hernia: No hernia is present.   Genitourinary:     Comments: deferred  Musculoskeletal:         General: No tenderness or deformity. Normal range of motion.      Cervical back: Normal range of motion and neck supple.      Right lower leg: No edema.      Left lower leg: No edema.   Lymphadenopathy:      Cervical: No cervical adenopathy.   Skin:     General: Skin is warm and dry.      Capillary Refill: Capillary refill takes 2 to 3 seconds.      Coloration: Skin is not pale.      Findings: No erythema or  rash.   Neurological:      General: No focal deficit present.      Mental Status: He is alert and oriented to person, place, and time.   Psychiatric:         Attention and Perception: Attention normal.         Mood and Affect: Mood normal.         Speech: Speech normal.         Behavior: Behavior normal. Behavior is cooperative.         Thought Content: Thought content normal.         Judgment: Judgment normal.                    Diagnoses and all orders for this visit:    1. Routine general medical examination at a health care facility (Primary)  -     TSH Rfx On Abnormal To Free T4; Future  -     CBC (No Diff); Future  -     Comprehensive Metabolic Panel; Future  -     Lipid Panel; Future    2. Essential hypertension  -     TSH Rfx On Abnormal To Free T4; Future  -     CBC (No Diff); Future  -     Comprehensive Metabolic Panel; Future  -     Lipid Panel; Future  -     amLODIPine (NORVASC) 10 MG tablet; Take 1 tablet by mouth Daily.  Dispense: 90 tablet; Refill: 1  -     lisinopril (PRINIVIL,ZESTRIL) 40 MG tablet; Take 1 tablet by mouth Daily.  Dispense: 90 tablet; Refill: 0    3. Acquired hypothyroidism  -     TSH Rfx On Abnormal To Free T4; Future  -     CBC (No Diff); Future  -     Comprehensive Metabolic Panel; Future  -     Lipid Panel; Future  -     levothyroxine (SYNTHROID, LEVOTHROID) 75 MCG tablet; Take 1 tablet by mouth Daily.  Dispense: 90 tablet; Refill: 1    4. Bilateral hand pain  -     XR hand 3+ vw bilateral; Future  -     NIDIA With / DsDNA, RNP, Sjogrens A / B, Harper; Future  -     Rheumatoid Factor; Future  -     Cyclic Citrul Peptide Antibody, IgG / IgA; Future  -     C-reactive Protein; Future  -     Sedimentation Rate; Future  -     Uric Acid; Future    5. Annual physical exam  -     amLODIPine (NORVASC) 10 MG tablet; Take 1 tablet by mouth Daily.  Dispense: 90 tablet; Refill: 1  -     lisinopril (PRINIVIL,ZESTRIL) 40 MG tablet; Take 1 tablet by mouth Daily.  Dispense: 90 tablet; Refill: 0  -      levothyroxine (SYNTHROID, LEVOTHROID) 75 MCG tablet; Take 1 tablet by mouth Daily.  Dispense: 90 tablet; Refill: 1        Assessment & Plan  1. Hypertension.  - Blood pressure readings are within the normal range.  - Prescriptions for amlodipine and lisinopril have been renewed.  - PDMP checked and is as expected.  - No changes to the current management plan are needed.    2. Hypothyroidism.  - He is currently on levothyroxine.  - Prescription for levothyroxine has been renewed.  - Thyroid function tests will be conducted to ensure appropriate dosing.  - No concerning symptoms reported such as constipation, diarrhea, jitteriness, restlessness, sleep disturbances, skin changes, or hair changes.    3. Anxiety.  - Reports that his anxiety is stable with current medication and follow-up visits twice a year with J[facility].  - No changes to his anxiety management plan are needed at this time.  - Continues to have some good days and some not great days.  - Counseling provided on the importance of monitoring symptoms and making changes if necessary.    4. Bilateral hand pain.  - Differential diagnosis includes arthritis and carpal tunnel syndrome.  - X-rays of both hands have been ordered to confirm the diagnosis.  - An autoimmune arthritis workup will be initiated with x-rays and laboratory tests.  - Advised to continue taking Tylenol Arthritis and to engage in hand strengthening exercises. If the current workup yields negative results, a nerve conduction study may be considered.    5. Health maintenance.  - Experienced a weight loss of 25 pounds since 12/2024.  - Tetanus immunization is valid until 2033.  - Hepatitis screening was conducted in 2022, with negative results for hepatitis C.  - Routine laboratory tests will be performed today, including complete blood count (CBC), kidney function tests, liver function tests, cholesterol panel, and autoimmune panel.  - A future test for hepatitis C may be considered due to a  recent potential exposure.    Follow-up  The patient will follow up in 6 months.       Labs  ordered as above- will notify of results and treat accordingly. If patient has not received results within one week via mychart or letter, they will notify my office    Follow up: Return in about 6 months (around 11/7/2025) for chronic condition follow up, and need to collect labs today.  Plan of care discussed with pt. They verbalized understanding and agreement.     Electronically signed by : NORBERTO Coulter    5/9/2025   20:06 EDT           Patient or patient representative verbalized consent for the use of Ambient Listening during the visit with  NORBERTO Coulter for chart documentation. 5/9/2025  20:06 EDT

## 2025-05-22 DIAGNOSIS — R79.89 LOW TESTOSTERONE: ICD-10-CM

## 2025-05-23 RX ORDER — TESTOSTERONE CYPIONATE 200 MG/ML
INJECTION, SOLUTION INTRAMUSCULAR
Qty: 2 ML | Refills: 0 | Status: SHIPPED | OUTPATIENT
Start: 2025-05-23

## 2025-05-23 NOTE — TELEPHONE ENCOUNTER
Rx Refill Note  Requested Prescriptions     Pending Prescriptions Disp Refills    Testosterone Cypionate (DEPOTESTOTERONE CYPIONATE) 200 MG/ML injection [Pharmacy Med Name: TESTOSTERONE CYP 200MG/ML SDV 1ML] 2 mL      Sig: ADMINISTER 1 ML IN THE MUSCLE EVERY 14 DAYS AS DIRECTED BY PRESCRIBER      Last office visit with prescribing clinician: 12/11/2024   Next office visit with prescribing clinician: 6/23/2025       Dora Torrez MA  05/23/25, 07:35 EDT

## 2025-06-16 DIAGNOSIS — Z00.00 ANNUAL PHYSICAL EXAM: ICD-10-CM

## 2025-06-16 DIAGNOSIS — E03.9 ACQUIRED HYPOTHYROIDISM: ICD-10-CM

## 2025-06-17 RX ORDER — LEVOTHYROXINE SODIUM 75 UG/1
75 TABLET ORAL DAILY
Qty: 90 TABLET | Refills: 1 | OUTPATIENT
Start: 2025-06-17

## 2025-06-26 ENCOUNTER — LAB (OUTPATIENT)
Dept: LAB | Facility: HOSPITAL | Age: 35
End: 2025-06-26
Payer: COMMERCIAL

## 2025-06-26 DIAGNOSIS — E03.9 ACQUIRED HYPOTHYROIDISM: ICD-10-CM

## 2025-06-26 DIAGNOSIS — M79.641 BILATERAL HAND PAIN: ICD-10-CM

## 2025-06-26 DIAGNOSIS — Z00.00 ROUTINE GENERAL MEDICAL EXAMINATION AT A HEALTH CARE FACILITY: ICD-10-CM

## 2025-06-26 DIAGNOSIS — M79.642 BILATERAL HAND PAIN: ICD-10-CM

## 2025-06-26 DIAGNOSIS — I10 ESSENTIAL HYPERTENSION: ICD-10-CM

## 2025-06-26 DIAGNOSIS — R79.89 LOW TESTOSTERONE: ICD-10-CM

## 2025-06-26 LAB
ALBUMIN SERPL-MCNC: 3.9 G/DL (ref 3.5–5.2)
ALBUMIN/GLOB SERPL: 1.8 G/DL
ALP SERPL-CCNC: 64 U/L (ref 39–117)
ALT SERPL W P-5'-P-CCNC: 27 U/L (ref 1–41)
ANION GAP SERPL CALCULATED.3IONS-SCNC: 11 MMOL/L (ref 5–15)
AST SERPL-CCNC: 21 U/L (ref 1–40)
BILIRUB SERPL-MCNC: <0.2 MG/DL (ref 0–1.2)
BUN SERPL-MCNC: 13 MG/DL (ref 6–20)
BUN/CREAT SERPL: 9.6 (ref 7–25)
CALCIUM SPEC-SCNC: 9 MG/DL (ref 8.6–10.5)
CHLORIDE SERPL-SCNC: 106 MMOL/L (ref 98–107)
CHOLEST SERPL-MCNC: 127 MG/DL (ref 0–200)
CHROMATIN AB SERPL-ACNC: <10 IU/ML (ref 0–14)
CO2 SERPL-SCNC: 26 MMOL/L (ref 22–29)
CREAT SERPL-MCNC: 1.35 MG/DL (ref 0.76–1.27)
CRP SERPL-MCNC: <0.3 MG/DL (ref 0–0.5)
DEPRECATED RDW RBC AUTO: 45.3 FL (ref 37–54)
EGFRCR SERPLBLD CKD-EPI 2021: 70.2 ML/MIN/1.73
ERYTHROCYTE [DISTWIDTH] IN BLOOD BY AUTOMATED COUNT: 13 % (ref 12.3–15.4)
ERYTHROCYTE [SEDIMENTATION RATE] IN BLOOD: 5 MM/HR (ref 0–15)
GLOBULIN UR ELPH-MCNC: 2.2 GM/DL
GLUCOSE SERPL-MCNC: 78 MG/DL (ref 65–99)
HCT VFR BLD AUTO: 40.7 % (ref 37.5–51)
HDLC SERPL-MCNC: 38 MG/DL (ref 40–60)
HGB BLD-MCNC: 13.4 G/DL (ref 13–17.7)
LDLC SERPL CALC-MCNC: 72 MG/DL (ref 0–100)
LDLC/HDLC SERPL: 1.87 {RATIO}
MCH RBC QN AUTO: 31.4 PG (ref 26.6–33)
MCHC RBC AUTO-ENTMCNC: 32.9 G/DL (ref 31.5–35.7)
MCV RBC AUTO: 95.3 FL (ref 79–97)
PLATELET # BLD AUTO: 240 10*3/MM3 (ref 140–450)
PMV BLD AUTO: 11.8 FL (ref 6–12)
POTASSIUM SERPL-SCNC: 4.1 MMOL/L (ref 3.5–5.2)
PROT SERPL-MCNC: 6.1 G/DL (ref 6–8.5)
RBC # BLD AUTO: 4.27 10*6/MM3 (ref 4.14–5.8)
SODIUM SERPL-SCNC: 143 MMOL/L (ref 136–145)
T4 FREE SERPL-MCNC: 1.25 NG/DL (ref 0.92–1.68)
TESTOST SERPL-MCNC: 1250 NG/DL (ref 249–836)
TRIGL SERPL-MCNC: 90 MG/DL (ref 0–150)
TSH SERPL DL<=0.05 MIU/L-ACNC: 4.4 UIU/ML (ref 0.27–4.2)
URATE SERPL-MCNC: 6.1 MG/DL (ref 3.4–7)
VLDLC SERPL-MCNC: 17 MG/DL (ref 5–40)
WBC NRBC COR # BLD AUTO: 6.83 10*3/MM3 (ref 3.4–10.8)

## 2025-06-26 PROCEDURE — 86200 CCP ANTIBODY: CPT

## 2025-06-26 PROCEDURE — 36415 COLL VENOUS BLD VENIPUNCTURE: CPT

## 2025-06-26 PROCEDURE — 84439 ASSAY OF FREE THYROXINE: CPT

## 2025-06-26 PROCEDURE — 80061 LIPID PANEL: CPT

## 2025-06-26 PROCEDURE — 85652 RBC SED RATE AUTOMATED: CPT

## 2025-06-26 PROCEDURE — 84550 ASSAY OF BLOOD/URIC ACID: CPT

## 2025-06-26 PROCEDURE — 86431 RHEUMATOID FACTOR QUANT: CPT

## 2025-06-26 PROCEDURE — 84403 ASSAY OF TOTAL TESTOSTERONE: CPT

## 2025-06-26 PROCEDURE — 86038 ANTINUCLEAR ANTIBODIES: CPT

## 2025-06-26 PROCEDURE — 86140 C-REACTIVE PROTEIN: CPT

## 2025-06-26 PROCEDURE — 80050 GENERAL HEALTH PANEL: CPT

## 2025-06-27 LAB
ANA SER QL: NEGATIVE
CCP IGA+IGG SERPL IA-ACNC: 6 UNITS (ref 0–19)

## 2025-07-01 DIAGNOSIS — Z00.00 ANNUAL PHYSICAL EXAM: ICD-10-CM

## 2025-07-01 DIAGNOSIS — F41.9 ANXIETY: ICD-10-CM

## 2025-07-02 RX ORDER — HYDROXYZINE HYDROCHLORIDE 50 MG/1
50 TABLET, FILM COATED ORAL EVERY 8 HOURS PRN
Qty: 90 TABLET | Refills: 2 | Status: SHIPPED | OUTPATIENT
Start: 2025-07-02

## 2025-07-08 ENCOUNTER — OFFICE VISIT (OUTPATIENT)
Dept: UROLOGY | Facility: CLINIC | Age: 35
End: 2025-07-08
Payer: COMMERCIAL

## 2025-07-08 VITALS — HEART RATE: 69 BPM | SYSTOLIC BLOOD PRESSURE: 125 MMHG | OXYGEN SATURATION: 97 % | DIASTOLIC BLOOD PRESSURE: 83 MMHG

## 2025-07-08 DIAGNOSIS — R79.89 LOW TESTOSTERONE: Primary | ICD-10-CM

## 2025-07-08 PROCEDURE — 1160F RVW MEDS BY RX/DR IN RCRD: CPT | Performed by: NURSE PRACTITIONER

## 2025-07-08 PROCEDURE — 99213 OFFICE O/P EST LOW 20 MIN: CPT | Performed by: NURSE PRACTITIONER

## 2025-07-08 PROCEDURE — 3074F SYST BP LT 130 MM HG: CPT | Performed by: NURSE PRACTITIONER

## 2025-07-08 PROCEDURE — 1159F MED LIST DOCD IN RCRD: CPT | Performed by: NURSE PRACTITIONER

## 2025-07-08 PROCEDURE — 3079F DIAST BP 80-89 MM HG: CPT | Performed by: NURSE PRACTITIONER

## 2025-07-08 RX ORDER — TESTOSTERONE ENANTHATE 75 MG/.5ML
0.5 INJECTION SUBCUTANEOUS
Qty: 2 ML | Refills: 0 | Status: SHIPPED | OUTPATIENT
Start: 2025-07-08

## 2025-07-08 NOTE — PROGRESS NOTES
Low Testosterone Office Visit      Patient Name: Dawit Schmitz  : 1990   MRN: [id number]     Chief Complaint: Low Testosterone.    Chief Complaint   Patient presents with    Low Testosterone   .     Referring Provider: No ref. provider found    History of Present Illness: Mr. Schmitz is a 35 y.o. male with history of low testosterone.  He is feeling well on testosterone replacement therapy however reports issues with getting testosterone filled and needles filled at [facility].  He is interested in trying another route of testosterone replacement.    Labs obtained 2-3 days after Testosterone injection.   25  Testosterone; 1250  Hematocrit; 40.7.   Subjective      Review of System: Review of Systems   All other systems reviewed and are negative.     I have reviewed the ROS documented by my clinical staff, I have updated appropriately and I agree. NORBERTO Cantrell    Past Medical History:  Past Medical History:   Diagnosis Date    ADHD (attention deficit hyperactivity disorder)     diagnosed around 7-8 years old    Anxiety     Arthritis of left ankle     Depression     Essential hypertension 2021    Fracture, foot     growth plate in right heel    Gastroesophageal reflux disease 2017    Hypothyroidism     Rotator cuff syndrome     right    Testosterone insufficiency        Past Surgical History:  Past Surgical History:   Procedure Laterality Date    KNEE ACL RECONSTRUCTION Right 2023    Procedure: KNEE ARTHROSCOPY WITH ANTERIOR CRUCIATE LIGAMENT RECONSTRUCTION WITH QUAD TENDON AUTOGRAFT- RIGHT;  Surgeon: Sajan Baldwin MD;  Location: [facility];  Service: Orthopedics;  Laterality: Right;    NO PAST SURGERIES         Medications:    Current Outpatient Medications:     amLODIPine (NORVASC) 10 MG tablet, Take 1 tablet by mouth Daily., Disp: 90 tablet, Rfl: 1    atomoxetine (STRATTERA) 60 MG capsule, Take 1 capsule by mouth Daily., Disp: , Rfl:     buPROPion XL (WELLBUTRIN  "XL) 300 MG 24 hr tablet, Take 1 tablet by mouth Every Morning., Disp: , Rfl:     hydrOXYzine (ATARAX) 50 MG tablet, TAKE 1 TABLET BY MOUTH EVERY 8 HOURS AS NEEDED FOR ITCHING, Disp: 90 tablet, Rfl: 2    levothyroxine (SYNTHROID, LEVOTHROID) 75 MCG tablet, Take 1 tablet by mouth Daily., Disp: 90 tablet, Rfl: 1    lisinopril (PRINIVIL,ZESTRIL) 40 MG tablet, Take 1 tablet by mouth Daily., Disp: 90 tablet, Rfl: 0    propranolol (INDERAL) 20 MG tablet, Take 1 tablet by mouth 2 (Two) Times a Day As Needed (SITUATIONAL ANXIETY)., Disp: , Rfl:     Syringe 22G X 1-1/2\" 3 ML misc, Use 1 mL Every 14 (Fourteen) Days., Disp: 50 each, Rfl: 0    B Complex Vitamins (VITAMIN B COMPLEX PO), Take 1 tablet by mouth Daily. (Patient not taking: Reported on 2025), Disp: , Rfl:     Testosterone Enanthate (Xyosted) 75 MG/0.5ML solution auto-injector, Inject 0.5 mL under the skin into the appropriate area as directed Every 7 (Seven) Days., Disp: 2 mL, Rfl: 0    Allergies:  Allergies   Allergen Reactions    Prozac [Fluoxetine Hcl] Anxiety     Worsened anxiety.        Social History:  Social History     Socioeconomic History    Marital status:    Tobacco Use    Smoking status: Former     Current packs/day: 0.00     Average packs/day: 0.5 packs/day for 9.0 years (4.5 ttl pk-yrs)     Types: Cigarettes     Start date: 2010     Quit date: 2019     Years since quittin.5     Passive exposure: Past    Smokeless tobacco: Former     Types: Snuff, Chew     Quit date: 2018   Vaping Use    Vaping status: Former    Substances: THC    Devices: Disposable, Pre-filled or refillable cartridge   Substance and Sexual Activity    Alcohol use: No    Drug use: Yes     Frequency: 42.0 times per week     Types: Marijuana     Comment: uses six servings a day    Sexual activity: Defer       Family History:  Family History   Problem Relation Age of Onset    Hypertension Mother     Thyroid disease Mother     Diabetes Paternal Uncle     Lung " cancer Maternal Grandmother     Prostate cancer Neg Hx     Testicular cancer Neg Hx     Kidney cancer Neg Hx         bladder cancer       IPSS Questionnaire (AUA-7):  Over the past month…    1)  Incomplete Emptying:       How often have you had a sensation of not emptying you had the sensation of not emptying your bladder completely after you finished urinating?  0 - Not at all   2)  Frequency:       How often have you had the urinate again less than two hours after you finished urinating?  0 - Not at all   3)  Intermittency:       How often have you found you stopped and started again several times when you urinated?   0 - Not at all   4) Urgency:      How often have you found it difficult to postpone urination?  0 - Not at all   5) Weak Stream:      How often have you had a weak urinary stream?  0 - Not at all   6) Straining:       How often have you had to push or strain to begin urination?  0 - Not at all   7) Nocturia:      How many times did you most typically get up to urinate from the time you went to bed at night until the time you got up in the morning?  1 - 1 time   Total Score:  1   The International Prostate Symptom Score (IPSS) is used to screen, diagnose, track symptoms of benign prostatic hyperplasia (BPH).   0-7 (Mild Symptoms) 8-19 (Moderate) 20-35 (Severe)   Quality of Life (QoL):  If you were to spend the rest of your life with your urinary condition just the way it is now, how would you feel about that? 2-Mostly Satisfied   Urine Leakage (Incontinence) 0-No Leakage       Objective     Physical Exam:   Vital Signs:   Vitals:    07/08/25 1500   BP: 125/83   Pulse: 69   SpO2: 97%     There is no height or weight on file to calculate BMI.     Physical Exam  Vitals and nursing note reviewed.   Constitutional:       Appearance: Normal appearance.   HENT:      Head: Normocephalic and atraumatic.      Nose: Nose normal.      Mouth/Throat:      Mouth: Mucous membranes are moist.   Eyes:      Pupils:  Pupils are equal, round, and reactive to light.   Pulmonary:      Effort: Pulmonary effort is normal.   Abdominal:      General: Abdomen is flat.      Palpations: Abdomen is soft.   Musculoskeletal:         General: Normal range of motion.      Cervical back: Normal range of motion.   Skin:     General: Skin is warm and dry.      Capillary Refill: Capillary refill takes less than 2 seconds.   Neurological:      General: No focal deficit present.      Mental Status: He is alert.   Psychiatric:         Mood and Affect: Mood normal.         Labs:   Lab Results   Component Value Date    TESTOSTERONE 1,250.00 (H) 06/26/2025       Lab Results   Component Value Date    PSA 0.230 08/20/2024       Lab Results   Component Value Date    WBC 6.83 06/26/2025    HGB 13.4 06/26/2025    HCT 40.7 06/26/2025    MCV 95.3 06/26/2025     06/26/2025       Images:   No Images in the past 120 days found..    Measures:   Tobacco:   Dawit Schmitz  reports that he quit smoking about 6 years ago. His smoking use included cigarettes. He started smoking about 15 years ago. He has a 4.5 pack-year smoking history. He has been exposed to tobacco smoke. He quit smokeless tobacco use about 7 years ago.  His smokeless tobacco use included snuff and chew.          Urine Incontinence: Patient reports that he is not currently experiencing any symptoms of urinary incontinence.    Assessment / Plan      Assessment/Plan:   Mr. Schmitz is a 35 y.o. male who presented today with low testosterone.      Patient elects to proceed with Xyosted 75 mg / 0.5 mL once weekly injections and we will send through [facility].  Ed reviewed.  Patient has 1 additional dose of his testosterone cypionate which he will inject tomorrow and then he will begin Xyosted in 2 weeks with once weekly injections.  He will discontinue his testosterone cypionate injections. He will follow up in 3 months with labs prior. He is agreeable with plan of care.      Diagnoses and all orders for this visit:    1. Low testosterone (Primary)  -     CBC (No Diff); Future  -     Testosterone; Future  -     Testosterone Enanthate (Xyosted) 75 MG/0.5ML solution auto-injector; Inject 0.5 mL under the skin into the appropriate area as directed Every 7 (Seven) Days.  Dispense: 2 mL; Refill: 0         Patient Education:   We discussed today the role testosterone plays for a male patient. I have indicated that low testosterone can be associated with metabolic syndrome, erectile dysfunction, coronary artery disease, diabetes, depression, osteoporosis, fatigue, low sex drive, poor sleep, high cholesterol, increased abdominal fat, muscle loss, irritability, hot flashes, and inability to concentrate.     Treatment options were discussed including SERMs, aromatase inhibitors, topical gel or patch, auto injector, testosterone injections every 2-3 weeks, long-acting injections every 10 weeks.    I explained the risks, benefits, and alternatives to testosterone therapies. I informed him of the potential SEs of chest and leg swelling, increased acne, change in mood, polycythemia, and worsening of undiagnosed prostate cancer.     Follow Up:   Return in about 3 months (around 10/8/2025) for Labs prior .    I spent approximately 20 minutes providing clinical care for this patient; including review of patient's chart and provider documentation, face to face time spent with patient in examination room (obtaining history, performing physical exam, discussing diagnosis and management options), placing orders, and completing patient documentation.     NORBERTO De La Torre  [facility] [place]

## (undated) DEVICE — BLANKT WARM UPPR/BDY ARM/OUT 57X196CM

## (undated) DEVICE — NDL KN SCORPION

## (undated) DEVICE — STRYKER 2-0 KNOT PUSHER/SUTURE CUTTER AND SLOTTED CANNULA SET

## (undated) DEVICE — INTENDED FOR TISSUE SEPARATION, AND OTHER PROCEDURES THAT REQUIRE A SHARP SURGICAL BLADE TO PUNCTURE OR CUT.: Brand: BARD-PARKER ® STAINLESS STEEL BLADES

## (undated) DEVICE — DRSNG PAD ABD 8X10IN STRL

## (undated) DEVICE — SUT VIC 2/0 CT2 27IN J269H

## (undated) DEVICE — BLD TNDN STRIP 10MM

## (undated) DEVICE — SST TWIST DRILL, STANDARD, 1.6MM DIA. X 127MM: Brand: MICROAIRE®

## (undated) DEVICE — UNDERCAST PADDING: Brand: DEROYAL

## (undated) DEVICE — TB CASSET ARTHSCP CROSSFLOW INFLOW LF

## (undated) DEVICE — PAD ARMBRD SURG CONVOL 7.5X20X2IN

## (undated) DEVICE — GLV SURG PREMIERPRO MIC LTX PF SZ8 BRN

## (undated) DEVICE — DISPOSABLE TOURNIQUET CUFF SINGLE BLADDER, SINGLE PORT AND QUICK CONNECT CONNECTOR: Brand: COLOR CUFF

## (undated) DEVICE — MARKER,SKIN,W/RULER,DUAL,STOP: Brand: MEDLINE

## (undated) DEVICE — TRAP FLD MINIVAC MEGADYNE 100ML

## (undated) DEVICE — TOWEL,OR,DSP,ST,BLUE,STD,4/PK,20PK/CS: Brand: MEDLINE

## (undated) DEVICE — DECANTER BAG 9": Brand: MEDLINE INDUSTRIES, INC.

## (undated) DEVICE — PATIENT RETURN ELECTRODE, SINGLE-USE, CONTACT QUALITY MONITORING, ADULT, WITH 9FT CORD, FOR PATIENTS WEIGING OVER 33LBS. (15KG): Brand: MEGADYNE

## (undated) DEVICE — ANTIBACTERIAL UNDYED BRAIDED (POLYGLACTIN 910), SYNTHETIC ABSORBABLE SUTURE: Brand: COATED VICRYL

## (undated) DEVICE — GLV SURG SENSICARE PI LF PF 7.0

## (undated) DEVICE — BOWL UTIL STRL 32OZ

## (undated) DEVICE — UNDRPD COMFRT GLD DRYPAD 36X57IN

## (undated) DEVICE — KT ACL TRANSTIB W/SAWBLD

## (undated) DEVICE — COVER,TABLE,HVY DUTY,60"X90",STRL: Brand: MEDLINE

## (undated) DEVICE — TBG PENCL TELESCP MEGADYNE SMOKE EVAC 10FT

## (undated) DEVICE — GLV SURG SENSICARE PI LF PF 7.5

## (undated) DEVICE — BRACE KN XACT/ROM TELESCP ADJ UNIV

## (undated) DEVICE — TP MICROFM 4IN LF

## (undated) DEVICE — MEDI-VAC YANKAUER SUCTION HANDLE W/BULBOUS TIP: Brand: CARDINAL HEALTH

## (undated) DEVICE — SUT MNCRYL PLS ANTIB UD 3/0 PS2 27IN

## (undated) DEVICE — BUR BRL FORMLA 12FLUT 5.5MM

## (undated) DEVICE — GLV SURG PREMIERPRO MIC LTX PF SZ7 BRN

## (undated) DEVICE — T-DRAPE,EXTREMITY,STERILE: Brand: MEDLINE

## (undated) DEVICE — PRECISION THIN (7.0 X 0.38 X 15.0MM)

## (undated) DEVICE — NDL REV W/NITNL LP C13 .5CIR 36.6MM

## (undated) DEVICE — GLV SURG SENSICARE PI MIC PF SZ8 LF STRL

## (undated) DEVICE — SHT AIR TRANSFR COMFRT GLIDE LAT 40X80IN

## (undated) DEVICE — GLV SURG SENSICARE PI MIC PF SZ7.5 LF STRL

## (undated) DEVICE — BNDG ESMARK 6INX9FT STRL

## (undated) DEVICE — GLV SURG SENSICARE PI LF PF 6.5

## (undated) DEVICE — GLV SURG PREMIERPRO MIC LTX PF SZ6.5 BRN

## (undated) DEVICE — BLD CUT FORMLA RESECTOR 5.5MM

## (undated) DEVICE — STRAP POSTN KN/BDY FM 5X72IN DISP

## (undated) DEVICE — PK ARTHSCP KN 10